# Patient Record
Sex: FEMALE | Race: WHITE | NOT HISPANIC OR LATINO | URBAN - METROPOLITAN AREA
[De-identification: names, ages, dates, MRNs, and addresses within clinical notes are randomized per-mention and may not be internally consistent; named-entity substitution may affect disease eponyms.]

---

## 2023-03-13 ENCOUNTER — EMERGENCY (EMERGENCY)
Facility: HOSPITAL | Age: 68
LOS: 1 days | Discharge: HOME | End: 2023-03-13
Attending: EMERGENCY MEDICINE | Admitting: EMERGENCY MEDICINE
Payer: MEDICARE

## 2023-03-13 VITALS
TEMPERATURE: 98 F | WEIGHT: 130.07 LBS | OXYGEN SATURATION: 97 % | HEART RATE: 92 BPM | SYSTOLIC BLOOD PRESSURE: 158 MMHG | RESPIRATION RATE: 18 BRPM | DIASTOLIC BLOOD PRESSURE: 103 MMHG

## 2023-03-13 VITALS
TEMPERATURE: 98 F | OXYGEN SATURATION: 98 % | HEART RATE: 85 BPM | RESPIRATION RATE: 18 BRPM | DIASTOLIC BLOOD PRESSURE: 89 MMHG | SYSTOLIC BLOOD PRESSURE: 150 MMHG

## 2023-03-13 LAB
ALBUMIN SERPL ELPH-MCNC: 3.5 G/DL — SIGNIFICANT CHANGE UP (ref 3.4–5)
ALP SERPL-CCNC: 104 U/L — SIGNIFICANT CHANGE UP (ref 40–120)
ALT FLD-CCNC: 22 U/L — SIGNIFICANT CHANGE UP (ref 12–42)
ANION GAP SERPL CALC-SCNC: 9 MMOL/L — SIGNIFICANT CHANGE UP (ref 9–16)
AST SERPL-CCNC: 16 U/L — SIGNIFICANT CHANGE UP (ref 15–37)
BASOPHILS # BLD AUTO: 0.03 K/UL — SIGNIFICANT CHANGE UP (ref 0–0.2)
BASOPHILS NFR BLD AUTO: 0.4 % — SIGNIFICANT CHANGE UP (ref 0–2)
BILIRUB SERPL-MCNC: 0.5 MG/DL — SIGNIFICANT CHANGE UP (ref 0.2–1.2)
BUN SERPL-MCNC: 19 MG/DL — SIGNIFICANT CHANGE UP (ref 7–23)
CALCIUM SERPL-MCNC: 9.6 MG/DL — SIGNIFICANT CHANGE UP (ref 8.5–10.5)
CHLORIDE SERPL-SCNC: 107 MMOL/L — SIGNIFICANT CHANGE UP (ref 96–108)
CO2 SERPL-SCNC: 25 MMOL/L — SIGNIFICANT CHANGE UP (ref 22–31)
CREAT SERPL-MCNC: 0.75 MG/DL — SIGNIFICANT CHANGE UP (ref 0.5–1.3)
EGFR: 88 ML/MIN/1.73M2 — SIGNIFICANT CHANGE UP
EOSINOPHIL # BLD AUTO: 0.13 K/UL — SIGNIFICANT CHANGE UP (ref 0–0.5)
EOSINOPHIL NFR BLD AUTO: 1.6 % — SIGNIFICANT CHANGE UP (ref 0–6)
GLUCOSE SERPL-MCNC: 110 MG/DL — HIGH (ref 70–99)
HCT VFR BLD CALC: 40.3 % — SIGNIFICANT CHANGE UP (ref 34.5–45)
HGB BLD-MCNC: 13.9 G/DL — SIGNIFICANT CHANGE UP (ref 11.5–15.5)
IMM GRANULOCYTES NFR BLD AUTO: 0.2 % — SIGNIFICANT CHANGE UP (ref 0–0.9)
LIDOCAIN IGE QN: 58 U/L — LOW (ref 73–393)
LYMPHOCYTES # BLD AUTO: 2.84 K/UL — SIGNIFICANT CHANGE UP (ref 1–3.3)
LYMPHOCYTES # BLD AUTO: 34 % — SIGNIFICANT CHANGE UP (ref 13–44)
MAGNESIUM SERPL-MCNC: 2 MG/DL — SIGNIFICANT CHANGE UP (ref 1.6–2.6)
MCHC RBC-ENTMCNC: 32.7 PG — SIGNIFICANT CHANGE UP (ref 27–34)
MCHC RBC-ENTMCNC: 34.5 GM/DL — SIGNIFICANT CHANGE UP (ref 32–36)
MCV RBC AUTO: 94.8 FL — SIGNIFICANT CHANGE UP (ref 80–100)
MONOCYTES # BLD AUTO: 0.47 K/UL — SIGNIFICANT CHANGE UP (ref 0–0.9)
MONOCYTES NFR BLD AUTO: 5.6 % — SIGNIFICANT CHANGE UP (ref 2–14)
NEUTROPHILS # BLD AUTO: 4.86 K/UL — SIGNIFICANT CHANGE UP (ref 1.8–7.4)
NEUTROPHILS NFR BLD AUTO: 58.2 % — SIGNIFICANT CHANGE UP (ref 43–77)
NRBC # BLD: 0 /100 WBCS — SIGNIFICANT CHANGE UP (ref 0–0)
PLATELET # BLD AUTO: 270 K/UL — SIGNIFICANT CHANGE UP (ref 150–400)
POTASSIUM SERPL-MCNC: 3.8 MMOL/L — SIGNIFICANT CHANGE UP (ref 3.5–5.3)
POTASSIUM SERPL-SCNC: 3.8 MMOL/L — SIGNIFICANT CHANGE UP (ref 3.5–5.3)
PROT SERPL-MCNC: 6.8 G/DL — SIGNIFICANT CHANGE UP (ref 6.4–8.2)
RBC # BLD: 4.25 M/UL — SIGNIFICANT CHANGE UP (ref 3.8–5.2)
RBC # FLD: 11.5 % — SIGNIFICANT CHANGE UP (ref 10.3–14.5)
SODIUM SERPL-SCNC: 141 MMOL/L — SIGNIFICANT CHANGE UP (ref 132–145)
TROPONIN I, HIGH SENSITIVITY RESULT: 16.1 NG/L — SIGNIFICANT CHANGE UP
WBC # BLD: 8.35 K/UL — SIGNIFICANT CHANGE UP (ref 3.8–10.5)
WBC # FLD AUTO: 8.35 K/UL — SIGNIFICANT CHANGE UP (ref 3.8–10.5)

## 2023-03-13 PROCEDURE — 73502 X-RAY EXAM HIP UNI 2-3 VIEWS: CPT | Mod: 26,LT

## 2023-03-13 PROCEDURE — 99285 EMERGENCY DEPT VISIT HI MDM: CPT

## 2023-03-13 PROCEDURE — 71045 X-RAY EXAM CHEST 1 VIEW: CPT | Mod: 26

## 2023-03-13 RX ORDER — MELOXICAM 15 MG/1
1 TABLET ORAL
Qty: 30 | Refills: 0
Start: 2023-03-13 | End: 2023-04-11

## 2023-03-13 RX ORDER — KETOROLAC TROMETHAMINE 30 MG/ML
15 SYRINGE (ML) INJECTION ONCE
Refills: 0 | Status: DISCONTINUED | OUTPATIENT
Start: 2023-03-13 | End: 2023-03-13

## 2023-03-13 RX ADMIN — Medication 15 MILLIGRAM(S): at 17:37

## 2023-03-13 RX ADMIN — Medication 15 MILLIGRAM(S): at 17:20

## 2023-03-13 NOTE — ED PROVIDER NOTE - NSFOLLOWUPINSTRUCTIONS_ED_ALL_ED_FT
Please follow up with a primary care doctor.  If you do not have a primary care doctor you may refer to one of the clinics listed below:    [facility]  [address]  [phone] (appointments)    [place] [address]  [phone]

## 2023-03-13 NOTE — ED ADULT NURSE NOTE - OBJECTIVE STATEMENT
PT walked in c/o of chest pain radiating to the left arm with numbness, palpitations x today and atraumatic left hip pain x 1 week. Pt reports palpitations and pain started after carrying around three suitcases at [place]. PMH of angina and htn, does not take medications. PT received Asprin and nitroglycerin pta as per triage note and patient. Denies fever, chills, sob, n/v/d. Ambulatory with steady gait. PT walked in c/o of chest pain radiating to the left arm with numbness, palpitations x today and atraumatic left hip pain x 1 week. Pt reports palpitations and pain started after carrying around three suitcases at [place]. PMH of angina and htn, does not take medications. PT received Asprin and nitroglycerin pta as per triage note and patient. Denies fever, chills, sob, n/v/d. Ambulatory with steady gait. Pt reports being evaluated at [facility] yesterday for the same complaint and was discharged.

## 2023-03-13 NOTE — ED ADULT NURSE NOTE - SKIN TURGOR
Vital Signs - nursing notes reviewed and confirmed  Gen - WDWN F, NAD, comfortable and non-toxic appearing, speaking in full sentences   Skin - warm, dry, intact  HEENT - AT/NC, PERRL, EOMI, no conjunctival injection, moist oral mucosa, TM intact b/l with good cone of lights, o/p clear with no erythema, edema, or exudate, uvula midline, airway patent, neck supple and NT, FROM, no JVD or carotid bruits b/l, no palpable nodes  CV - S1S2, R/R/R, no m/r/g   Resp - respiration non-labored, CTAB, symmetric bs b/l, no r/r/w  GI - NABS, soft, ND, NT, no rebound or guarding, no CVAT b/l   MS - w/w/p, no c/c/e, calves supple and NT, distal pulses symmetric b/l, brisk cap refills, +SILT  Neuro - AxOx3, no focal neuro deficits, CN II-XII grossly intact, cerebellar function intact, negative pronator drift, negative nystagmus, ambulatory without gait disturbance
resilient/elastic

## 2023-03-13 NOTE — ED PROVIDER NOTE - NS ED ROS FT
· CONSTITUTIONAL: no fever and no chills.  · CARDIOVASCULAR: no chest pain, +palpitations  · RESPIRATORY: no sob  · GASTROINTESTINAL: no abdominal pain, no nausea and no vomiting.  · MUSCULOSKELETAL: +left hip pain  · NEURO: no headache, +fogginess  All other systems negative

## 2023-03-13 NOTE — ED ADULT TRIAGE NOTE - CHIEF COMPLAINT QUOTE
pt. c/o sudden onset of chest pain, neck pain and hip pain while walking. Pt. with hx. of HTN but has not taken her meds in a week. Pt. given 324mg ASA and one dose of sublingual nitro PTA. Pt. was treated and released at [facility] yesterday for similar complaints.

## 2023-03-13 NOTE — ED PROVIDER NOTE - PATIENT PORTAL LINK FT
You can access the FollowMyHealth Patient Portal offered by [facility] by registering at the following website: [email or web address]. By joining [employer] [facility] [facility]’s FollowMyHealth portal, you will also be able to view your health information using other applications (apps) compatible with our system.

## 2023-03-13 NOTE — ED PROVIDER NOTE - OBJECTIVE STATEMENT
67-year-old female presenting complaining of palpitations as well as pain in the left hip and numbness in the left arm.  She was walking carrying lots of luggage to her cousins to put in storage when the symptoms began.  It for started as hip pain and then the palpitations started.  She reports being at [facility] a few days ago for similar symptoms and was discharged but not told anything and not given any medications.  She is unable to provide me the paperwork from that discharge.  She does have a history of angina and hypertension but is not taking any medications.  She denies any injuries or falls.

## 2023-03-13 NOTE — ED PROVIDER NOTE - CLINICAL SUMMARY MEDICAL DECISION MAKING FREE TEXT BOX
67-year-old female presenting complaining of palpitations as well as left hip pain and a foggy feeling in her head.  She already had a full cardiac work-up including a CT scan of the chest at [facility] in the last few days.  Although she is not able to provide paperwork from that hospitalization.  Her EKG shows no ectopy or ischemic changes.  Labs including troponin are normal.  Chest x-ray also reveals no abnormalities.  She has been awake and alert and in no distress since arriving in the ER.  Will discharge with recommendations to start taking meloxicam for her left hip as there does appear to be arthritis in that joint.  We will also provide contact information for the patient to establish care with a primary care doctor.

## 2023-03-13 NOTE — ED ADULT NURSE NOTE - CHIEF COMPLAINT QUOTE
pt. c/o sudden onset of chest pain, neck pain and hip pain while walking. Pt. with hx. of HTN but has not taken her meds in a week. Pt. given 324mg ASA and one dose of sublingual nitro PTA. Pt. was treated and released at [facility] yesterday for similar complaints.

## 2023-03-15 DIAGNOSIS — R00.2 PALPITATIONS: ICD-10-CM

## 2023-03-15 DIAGNOSIS — I10 ESSENTIAL (PRIMARY) HYPERTENSION: ICD-10-CM

## 2023-03-15 DIAGNOSIS — M25.552 PAIN IN LEFT HIP: ICD-10-CM

## 2023-03-15 DIAGNOSIS — Z87.39 PERSONAL HISTORY OF OTHER DISEASES OF THE MUSCULOSKELETAL SYSTEM AND CONNECTIVE TISSUE: ICD-10-CM

## 2023-03-15 DIAGNOSIS — Z86.79 PERSONAL HISTORY OF OTHER DISEASES OF THE CIRCULATORY SYSTEM: ICD-10-CM

## 2023-03-15 DIAGNOSIS — R20.0 ANESTHESIA OF SKIN: ICD-10-CM

## 2023-04-07 ENCOUNTER — EMERGENCY (EMERGENCY)
Facility: HOSPITAL | Age: 68
LOS: 1 days | Discharge: HOME | End: 2023-04-07
Attending: EMERGENCY MEDICINE | Admitting: EMERGENCY MEDICINE
Payer: MEDICARE

## 2023-04-07 VITALS
HEART RATE: 90 BPM | HEIGHT: 57 IN | RESPIRATION RATE: 18 BRPM | SYSTOLIC BLOOD PRESSURE: 174 MMHG | TEMPERATURE: 99 F | DIASTOLIC BLOOD PRESSURE: 98 MMHG | OXYGEN SATURATION: 93 % | WEIGHT: 130.07 LBS

## 2023-04-07 VITALS
DIASTOLIC BLOOD PRESSURE: 80 MMHG | TEMPERATURE: 98 F | OXYGEN SATURATION: 95 % | HEART RATE: 91 BPM | SYSTOLIC BLOOD PRESSURE: 154 MMHG | RESPIRATION RATE: 16 BRPM

## 2023-04-07 DIAGNOSIS — R05.3 CHRONIC COUGH: ICD-10-CM

## 2023-04-07 DIAGNOSIS — R07.9 CHEST PAIN, UNSPECIFIED: ICD-10-CM

## 2023-04-07 DIAGNOSIS — F17.200 NICOTINE DEPENDENCE, UNSPECIFIED, UNCOMPLICATED: ICD-10-CM

## 2023-04-07 DIAGNOSIS — R06.00 DYSPNEA, UNSPECIFIED: ICD-10-CM

## 2023-04-07 DIAGNOSIS — M79.605 PAIN IN LEFT LEG: ICD-10-CM

## 2023-04-07 DIAGNOSIS — R06.2 WHEEZING: ICD-10-CM

## 2023-04-07 DIAGNOSIS — Z20.822 CONTACT WITH AND (SUSPECTED) EXPOSURE TO COVID-19: ICD-10-CM

## 2023-04-07 DIAGNOSIS — Z86.79 PERSONAL HISTORY OF OTHER DISEASES OF THE CIRCULATORY SYSTEM: ICD-10-CM

## 2023-04-07 LAB
ALBUMIN SERPL ELPH-MCNC: 3.5 G/DL — SIGNIFICANT CHANGE UP (ref 3.4–5)
ALP SERPL-CCNC: 125 U/L — HIGH (ref 40–120)
ALT FLD-CCNC: 24 U/L — SIGNIFICANT CHANGE UP (ref 12–42)
ANION GAP SERPL CALC-SCNC: 6 MMOL/L — LOW (ref 9–16)
AST SERPL-CCNC: 19 U/L — SIGNIFICANT CHANGE UP (ref 15–37)
BASOPHILS # BLD AUTO: 0.03 K/UL — SIGNIFICANT CHANGE UP (ref 0–0.2)
BASOPHILS NFR BLD AUTO: 0.3 % — SIGNIFICANT CHANGE UP (ref 0–2)
BILIRUB SERPL-MCNC: 0.3 MG/DL — SIGNIFICANT CHANGE UP (ref 0.2–1.2)
BUN SERPL-MCNC: 27 MG/DL — HIGH (ref 7–23)
CALCIUM SERPL-MCNC: 10 MG/DL — SIGNIFICANT CHANGE UP (ref 8.5–10.5)
CHLORIDE SERPL-SCNC: 107 MMOL/L — SIGNIFICANT CHANGE UP (ref 96–108)
CO2 SERPL-SCNC: 31 MMOL/L — SIGNIFICANT CHANGE UP (ref 22–31)
CREAT SERPL-MCNC: 0.96 MG/DL — SIGNIFICANT CHANGE UP (ref 0.5–1.3)
EGFR: 66 ML/MIN/1.73M2 — SIGNIFICANT CHANGE UP
EOSINOPHIL # BLD AUTO: 0.22 K/UL — SIGNIFICANT CHANGE UP (ref 0–0.5)
EOSINOPHIL NFR BLD AUTO: 2.1 % — SIGNIFICANT CHANGE UP (ref 0–6)
ETHANOL SERPL-MCNC: <3 MG/DL — SIGNIFICANT CHANGE UP
GLUCOSE SERPL-MCNC: 158 MG/DL — HIGH (ref 70–99)
HCT VFR BLD CALC: 40.6 % — SIGNIFICANT CHANGE UP (ref 34.5–45)
HGB BLD-MCNC: 13.9 G/DL — SIGNIFICANT CHANGE UP (ref 11.5–15.5)
IMM GRANULOCYTES NFR BLD AUTO: 0.4 % — SIGNIFICANT CHANGE UP (ref 0–0.9)
LYMPHOCYTES # BLD AUTO: 3.42 K/UL — HIGH (ref 1–3.3)
LYMPHOCYTES # BLD AUTO: 33 % — SIGNIFICANT CHANGE UP (ref 13–44)
MAGNESIUM SERPL-MCNC: 2.2 MG/DL — SIGNIFICANT CHANGE UP (ref 1.6–2.6)
MCHC RBC-ENTMCNC: 32.8 PG — SIGNIFICANT CHANGE UP (ref 27–34)
MCHC RBC-ENTMCNC: 34.2 GM/DL — SIGNIFICANT CHANGE UP (ref 32–36)
MCV RBC AUTO: 95.8 FL — SIGNIFICANT CHANGE UP (ref 80–100)
MONOCYTES # BLD AUTO: 0.53 K/UL — SIGNIFICANT CHANGE UP (ref 0–0.9)
MONOCYTES NFR BLD AUTO: 5.1 % — SIGNIFICANT CHANGE UP (ref 2–14)
NEUTROPHILS # BLD AUTO: 6.13 K/UL — SIGNIFICANT CHANGE UP (ref 1.8–7.4)
NEUTROPHILS NFR BLD AUTO: 59.1 % — SIGNIFICANT CHANGE UP (ref 43–77)
NRBC # BLD: 0 /100 WBCS — SIGNIFICANT CHANGE UP (ref 0–0)
NT-PROBNP SERPL-SCNC: 165 PG/ML — SIGNIFICANT CHANGE UP
PCO2 BLDV: 44 MMHG — HIGH (ref 39–42)
PH BLDV: 7.42 — SIGNIFICANT CHANGE UP (ref 7.32–7.43)
PLATELET # BLD AUTO: 347 K/UL — SIGNIFICANT CHANGE UP (ref 150–400)
PO2 BLDV: 46 MMHG — HIGH (ref 25–45)
POTASSIUM SERPL-MCNC: 4.1 MMOL/L — SIGNIFICANT CHANGE UP (ref 3.5–5.3)
POTASSIUM SERPL-SCNC: 4.1 MMOL/L — SIGNIFICANT CHANGE UP (ref 3.5–5.3)
PROT SERPL-MCNC: 7.1 G/DL — SIGNIFICANT CHANGE UP (ref 6.4–8.2)
RBC # BLD: 4.24 M/UL — SIGNIFICANT CHANGE UP (ref 3.8–5.2)
RBC # FLD: 11.7 % — SIGNIFICANT CHANGE UP (ref 10.3–14.5)
SAO2 % BLDV: 80.6 % — SIGNIFICANT CHANGE UP (ref 67–88)
SARS-COV-2 RNA SPEC QL NAA+PROBE: SIGNIFICANT CHANGE UP
SODIUM SERPL-SCNC: 144 MMOL/L — SIGNIFICANT CHANGE UP (ref 132–145)
TROPONIN I, HIGH SENSITIVITY RESULT: 31.7 NG/L — SIGNIFICANT CHANGE UP
WBC # BLD: 10.37 K/UL — SIGNIFICANT CHANGE UP (ref 3.8–10.5)
WBC # FLD AUTO: 10.37 K/UL — SIGNIFICANT CHANGE UP (ref 3.8–10.5)

## 2023-04-07 PROCEDURE — 99285 EMERGENCY DEPT VISIT HI MDM: CPT | Mod: CS

## 2023-04-07 PROCEDURE — 71045 X-RAY EXAM CHEST 1 VIEW: CPT | Mod: 26

## 2023-04-07 PROCEDURE — 73590 X-RAY EXAM OF LOWER LEG: CPT | Mod: 26,LT

## 2023-04-07 RX ORDER — ACETAMINOPHEN 500 MG
650 TABLET ORAL ONCE
Refills: 0 | Status: COMPLETED | OUTPATIENT
Start: 2023-04-07 | End: 2023-04-07

## 2023-04-07 RX ORDER — ALBUTEROL 90 UG/1
2 AEROSOL, METERED ORAL ONCE
Refills: 0 | Status: COMPLETED | OUTPATIENT
Start: 2023-04-07 | End: 2023-04-07

## 2023-04-07 RX ORDER — DEXAMETHASONE 0.5 MG/5ML
4 ELIXIR ORAL ONCE
Refills: 0 | Status: COMPLETED | OUTPATIENT
Start: 2023-04-07 | End: 2023-04-07

## 2023-04-07 RX ORDER — METHOCARBAMOL 500 MG/1
1500 TABLET, FILM COATED ORAL ONCE
Refills: 0 | Status: COMPLETED | OUTPATIENT
Start: 2023-04-07 | End: 2023-04-07

## 2023-04-07 RX ADMIN — METHOCARBAMOL 1500 MILLIGRAM(S): 500 TABLET, FILM COATED ORAL at 20:32

## 2023-04-07 RX ADMIN — ALBUTEROL 2 PUFF(S): 90 AEROSOL, METERED ORAL at 20:32

## 2023-04-07 RX ADMIN — Medication 650 MILLIGRAM(S): at 20:32

## 2023-04-07 RX ADMIN — Medication 4 MILLIGRAM(S): at 20:32

## 2023-04-07 NOTE — ED PROVIDER NOTE - PATIENT PORTAL LINK FT
You can access the FollowMyHealth Patient Portal offered by [facility] by registering at the following website: [email or web address]. By joining [employer]’s FollowMyHealth portal, you will also be able to view your health information using other applications (apps) compatible with our system.

## 2023-04-07 NOTE — ED PROVIDER NOTE - CLINICAL SUMMARY MEDICAL DECISION MAKING FREE TEXT BOX
pt presents w  several complains, similar visit in March. Will work up for chest pain/dyspnea. On exam some mild wheezing on expiration.   given hx of smoking suspect early undiagnosed COPD. will treat w  inhaler/decadron dose. Will do rest of labs for chest pain work up and CXR. Will also get leg  xrays as pt is complaining of pain. EKG same as prior visit.

## 2023-04-07 NOTE — ED ADULT NURSE NOTE - NSIMPLEMENTINTERV_GEN_ALL_ED
Implemented All Fall Risk Interventions:  [place] to call system. Call bell, personal items and telephone within reach. Instruct patient to call for assistance. Room bathroom lighting operational. Non-slip footwear when patient is off stretcher. Physically safe environment: no spills, clutter or unnecessary equipment. Stretcher in lowest position, wheels locked, appropriate side rails in place. Provide visual cue, wrist band, yellow gown, etc. Monitor gait and stability. Monitor for mental status changes and reorient to person, place, and time. Review medications for side effects contributing to fall risk. Reinforce activity limits and safety measures with patient and family.

## 2023-04-07 NOTE — ED ADULT TRIAGE NOTE - CHIEF COMPLAINT QUOTE
BIBA from street for palpitations x 30min and L Leg/hip pain x 2 days. h/o HTN, non compliant and smoker.

## 2023-04-07 NOTE — ED ADULT NURSE NOTE - CHPI ED NUR SYMPTOMS POS
Medications verified, no changes.  Denies known Latex allergy or symptoms of Latex sensitivity.  Pt. Declines weigh, states it was taken yeserday and is 207.     PAIN

## 2023-04-07 NOTE — ED PROVIDER NOTE - OBJECTIVE STATEMENT
68 yo female pt, hx of angina,  chronic smoker, presents w several complains. Presents w episodes of dyspnea and chest pain that happen intermittently. Pt states she is an active smoker and has  a chronic cough as well but no fever or production of sputum. Also complaining of non traumatic pain in LLE, shin area. no swelling, no deformity, no skin changes. no abd pain, no n/v/d, no HA, no neck pain.

## 2023-04-07 NOTE — ED ADULT NURSE NOTE - CHPI ED NUR AGGRAVATING FX
Maintaining well with timed voids, splinting  No incontinence, no UTI  Will call if symptoms exertion

## 2023-04-07 NOTE — ED PROVIDER NOTE - CARE PROVIDER_API CALL
Paloma Baer)  Internal Medicine  [address], [facility] [place] [address]  Phone: [phone]  Fax: [phone]  Follow Up Time:

## 2023-04-08 PROBLEM — Z86.79 PERSONAL HISTORY OF OTHER DISEASES OF THE CIRCULATORY SYSTEM: Chronic | Status: ACTIVE | Noted: 2023-03-13

## 2023-04-08 PROBLEM — M54.9 DORSALGIA, UNSPECIFIED: Chronic | Status: ACTIVE | Noted: 2023-03-13

## 2023-08-14 NOTE — ED PROVIDER NOTE - ST/T WAVE
PT settled into gurney, connected to cardiac monitor.   Aox4, Dried crusts surrounding G tube placement noted along with dark debris noted all along the inside of G tube.    lateral T inversions

## 2024-03-18 ENCOUNTER — INPATIENT (INPATIENT)
Facility: HOSPITAL | Age: 69
LOS: 3 days | Discharge: NURSING, REHAB OR LONG-TERM CARE | DRG: 62 | End: 2024-03-22
Attending: PSYCHIATRY & NEUROLOGY | Admitting: PSYCHIATRY & NEUROLOGY
Payer: MEDICARE

## 2024-03-18 VITALS
SYSTOLIC BLOOD PRESSURE: 147 MMHG | OXYGEN SATURATION: 97 % | RESPIRATION RATE: 16 BRPM | HEART RATE: 82 BPM | WEIGHT: 119.05 LBS | DIASTOLIC BLOOD PRESSURE: 83 MMHG | HEIGHT: 57 IN | TEMPERATURE: 98 F

## 2024-03-18 LAB
ALBUMIN SERPL ELPH-MCNC: 4 G/DL — SIGNIFICANT CHANGE UP (ref 3.3–5)
ALP SERPL-CCNC: 111 U/L — SIGNIFICANT CHANGE UP (ref 40–120)
ALT FLD-CCNC: 13 U/L — SIGNIFICANT CHANGE UP (ref 10–45)
ANION GAP SERPL CALC-SCNC: 9 MMOL/L — SIGNIFICANT CHANGE UP (ref 5–17)
APTT BLD: 28 SEC — SIGNIFICANT CHANGE UP (ref 24.5–35.6)
AST SERPL-CCNC: 17 U/L — SIGNIFICANT CHANGE UP (ref 10–40)
BASOPHILS # BLD AUTO: 0.03 K/UL — SIGNIFICANT CHANGE UP (ref 0–0.2)
BASOPHILS NFR BLD AUTO: 0.3 % — SIGNIFICANT CHANGE UP (ref 0–2)
BILIRUB SERPL-MCNC: 0.6 MG/DL — SIGNIFICANT CHANGE UP (ref 0.2–1.2)
BUN SERPL-MCNC: 19 MG/DL — SIGNIFICANT CHANGE UP (ref 7–23)
CALCIUM SERPL-MCNC: 10.3 MG/DL — SIGNIFICANT CHANGE UP (ref 8.4–10.5)
CHLORIDE SERPL-SCNC: 108 MMOL/L — SIGNIFICANT CHANGE UP (ref 96–108)
CO2 SERPL-SCNC: 25 MMOL/L — SIGNIFICANT CHANGE UP (ref 22–31)
CREAT SERPL-MCNC: 0.88 MG/DL — SIGNIFICANT CHANGE UP (ref 0.5–1.3)
EGFR: 72 ML/MIN/1.73M2 — SIGNIFICANT CHANGE UP
EOSINOPHIL # BLD AUTO: 0.09 K/UL — SIGNIFICANT CHANGE UP (ref 0–0.5)
EOSINOPHIL NFR BLD AUTO: 0.8 % — SIGNIFICANT CHANGE UP (ref 0–6)
FLUAV AG NPH QL: SIGNIFICANT CHANGE UP
FLUBV AG NPH QL: SIGNIFICANT CHANGE UP
GLUCOSE SERPL-MCNC: 144 MG/DL — HIGH (ref 70–99)
HCT VFR BLD CALC: 41.5 % — SIGNIFICANT CHANGE UP (ref 34.5–45)
HGB BLD-MCNC: 14.1 G/DL — SIGNIFICANT CHANGE UP (ref 11.5–15.5)
IMM GRANULOCYTES NFR BLD AUTO: 0.4 % — SIGNIFICANT CHANGE UP (ref 0–0.9)
INR BLD: 0.98 — SIGNIFICANT CHANGE UP (ref 0.85–1.18)
LYMPHOCYTES # BLD AUTO: 28.1 % — SIGNIFICANT CHANGE UP (ref 13–44)
LYMPHOCYTES # BLD AUTO: 3.14 K/UL — SIGNIFICANT CHANGE UP (ref 1–3.3)
MCHC RBC-ENTMCNC: 32 PG — SIGNIFICANT CHANGE UP (ref 27–34)
MCHC RBC-ENTMCNC: 34 GM/DL — SIGNIFICANT CHANGE UP (ref 32–36)
MCV RBC AUTO: 94.3 FL — SIGNIFICANT CHANGE UP (ref 80–100)
MONOCYTES # BLD AUTO: 0.72 K/UL — SIGNIFICANT CHANGE UP (ref 0–0.9)
MONOCYTES NFR BLD AUTO: 6.4 % — SIGNIFICANT CHANGE UP (ref 2–14)
NEUTROPHILS # BLD AUTO: 7.17 K/UL — SIGNIFICANT CHANGE UP (ref 1.8–7.4)
NEUTROPHILS NFR BLD AUTO: 64 % — SIGNIFICANT CHANGE UP (ref 43–77)
NRBC # BLD: 0 /100 WBCS — SIGNIFICANT CHANGE UP (ref 0–0)
NT-PROBNP SERPL-SCNC: 107 PG/ML — SIGNIFICANT CHANGE UP (ref 0–300)
PLATELET # BLD AUTO: 407 K/UL — HIGH (ref 150–400)
POTASSIUM SERPL-MCNC: 4.2 MMOL/L — SIGNIFICANT CHANGE UP (ref 3.5–5.3)
POTASSIUM SERPL-SCNC: 4.2 MMOL/L — SIGNIFICANT CHANGE UP (ref 3.5–5.3)
PROT SERPL-MCNC: 6.5 G/DL — SIGNIFICANT CHANGE UP (ref 6–8.3)
PROTHROM AB SERPL-ACNC: 11.2 SEC — SIGNIFICANT CHANGE UP (ref 9.5–13)
RBC # BLD: 4.4 M/UL — SIGNIFICANT CHANGE UP (ref 3.8–5.2)
RBC # FLD: 12 % — SIGNIFICANT CHANGE UP (ref 10.3–14.5)
RSV RNA NPH QL NAA+NON-PROBE: SIGNIFICANT CHANGE UP
SARS-COV-2 RNA SPEC QL NAA+PROBE: SIGNIFICANT CHANGE UP
SODIUM SERPL-SCNC: 142 MMOL/L — SIGNIFICANT CHANGE UP (ref 135–145)
TROPONIN T, HIGH SENSITIVITY RESULT: 7 NG/L — SIGNIFICANT CHANGE UP (ref 0–51)
WBC # BLD: 11.19 K/UL — HIGH (ref 3.8–10.5)
WBC # FLD AUTO: 11.19 K/UL — HIGH (ref 3.8–10.5)

## 2024-03-18 PROCEDURE — 70498 CT ANGIOGRAPHY NECK: CPT | Mod: 26,MC

## 2024-03-18 PROCEDURE — 70496 CT ANGIOGRAPHY HEAD: CPT | Mod: 26,MC

## 2024-03-18 PROCEDURE — 99291 CRITICAL CARE FIRST HOUR: CPT

## 2024-03-18 PROCEDURE — 70450 CT HEAD/BRAIN W/O DYE: CPT | Mod: 26,MC,59

## 2024-03-18 PROCEDURE — 71045 X-RAY EXAM CHEST 1 VIEW: CPT | Mod: 26

## 2024-03-18 PROCEDURE — 0042T: CPT | Mod: MC

## 2024-03-18 RX ORDER — TENECTEPLASE 50 MG
14 KIT INTRAVENOUS ONCE
Refills: 0 | Status: COMPLETED | OUTPATIENT
Start: 2024-03-18 | End: 2024-03-18

## 2024-03-18 RX ORDER — SODIUM CHLORIDE 9 MG/ML
10 INJECTION INTRAMUSCULAR; INTRAVENOUS; SUBCUTANEOUS ONCE
Refills: 0 | Status: DISCONTINUED | OUTPATIENT
Start: 2024-03-18 | End: 2024-03-18

## 2024-03-18 RX ORDER — NICOTINE POLACRILEX 2 MG
1 GUM BUCCAL DAILY
Refills: 0 | Status: DISCONTINUED | OUTPATIENT
Start: 2024-03-18 | End: 2024-03-22

## 2024-03-18 RX ORDER — LABETALOL HCL 100 MG
20 TABLET ORAL ONCE
Refills: 0 | Status: COMPLETED | OUTPATIENT
Start: 2024-03-18 | End: 2024-03-18

## 2024-03-18 RX ORDER — SODIUM CHLORIDE 9 MG/ML
1000 INJECTION INTRAMUSCULAR; INTRAVENOUS; SUBCUTANEOUS
Refills: 0 | Status: DISCONTINUED | OUTPATIENT
Start: 2024-03-18 | End: 2024-03-19

## 2024-03-18 RX ADMIN — Medication 20 MILLIGRAM(S): at 16:51

## 2024-03-18 RX ADMIN — Medication 1 PATCH: at 23:32

## 2024-03-18 RX ADMIN — TENECTEPLASE 2016 MILLIGRAM(S): KIT at 16:56

## 2024-03-18 NOTE — ED ADULT NURSE NOTE - OBJECTIVE STATEMENT
Pt is a 67 yo F bibems from [facility] initially c/o SOB and generalized weakness during triage. Upon RN marcell however, pt c/o weakness to both L extremities starting around 1300 today, denies fall, loc. On exam, pt demonstrated decreased strength in L hand and L leg, L-sided pronator drift and unable to lift L leg off of bed; L sided facial droop also noted when smiling. Otherwise pt speaking in full and complete sentences, respirations even and unlabored. Pt able to scoot herself up in stretcher for repositioning.  MD Rosario notified of exam findings, at bedside to assess pt. Stroke code called, with neuro PA arriving to bedside. PIV placed and pt transported to CT on portable cardiac monitor.  Pt is an everyday smoker 1/2-1 ppd.

## 2024-03-18 NOTE — ED PROVIDER NOTE - OBJECTIVE STATEMENT
67 yo female pt, hx of angina,  chronic smoker. at ~1pm today developed weakness and sob and palpitations. prior to that was in her usual state of health today. still doesn't feel "right"

## 2024-03-18 NOTE — ED PROVIDER NOTE - PHYSICAL EXAMINATION
General: Awake, alert and oriented. No acute distress.   Skin:  Appropriate color for ethnicity  HENMT: head normocephalic and atraumatic  EYES: Conjunctiva clear. nonicteric sclera  Cardiac: well perfused  Respiratory: breathing comfortably on room air  Abdominal: nondistended  MSK:  no visualized external signs of trauma. no apparent deficits in ROM of any extremity  Neurological: The patient is awake, alert and oriented with normal speech. left facial droop, 3/5 strength in left arm and leg. right extremity strength 5/5. Memory is normal and thought process is intact. moving all extremities spontaneously. sensation intact in all 4 extremities  Psychiatric: Appropriate mood and affect. Good judgement and insight

## 2024-03-18 NOTE — ED PROVIDER NOTE - NS ED MD DISPO ADMIT [facility] PALLIATIVE CARE
NONE
I have personally seen and examined this patient.  I have fully participated in the care of this patient. I have reviewed all pertinent clinical information, including history, physical exam, plan and the Resident’s note and agree except as noted.

## 2024-03-18 NOTE — ED ADULT NURSE NOTE - NSFALLHARMRISKINTERV_ED_ALL_ED

## 2024-03-18 NOTE — H&P ADULT - HISTORY OF PRESENT ILLNESS
**STROKE HPI***    HPI: 68y Female with PMHx of tobacco use disorder, HTN, COPD, DM, angina presenting to ED for SOB and L sided weakness/numbness. LKW 1pm when sx started. NIHSS 8. L NLFF, LUE 3/5, LLE 2/5, LUE/LLE numbness. CTH no acute intracranial hemorrhage, mass effect or large demarcated territorial infarction. Punctate linear density in the right cerebellar hemisphere likely representing calcification. Intracranial CTA: No large vessel occlusion. Supraclinoid left ICA aneurysm versus infundibulum. Extracranial CTA: Moderate stenosis the proximal bilateral internal carotid artery secondary to mixed calcified and noncalcified plaque. Moderate stenosis the proximal left subclavian artery. 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 5 mm right upper lobe pulmonary nodule. Case discussed with Dr. Araujo prior to urgent intervention. Risk and benefits of tenecteplase were discussed with patient including risk of symptomatic ICH/death. Decision was made that benefits outweighed risks and tenecteplase was administered at 1656.        PAST MEDICAL & SURGICAL HISTORY:  History of angina      Back pain          FAMILY HISTORY:    T(C): 36.5 (03-18-24 @ 15:05), Max: 36.5 (03-18-24 @ 15:05)  HR: 70 (03-18-24 @ 16:55) (70 - 86)  BP: 153/73 (03-18-24 @ 17:00) (147/83 - 219/102)  RR: 18 (03-18-24 @ 16:55) (16 - 18)  SpO2: 97% (03-18-24 @ 16:55) (94% - 98%)    MEDICATION RECONCILIATION   MEDICATIONS  (STANDING):  labetalol Injectable 20 milliGRAM(s) IV Push Once  tenecteplase 50 milliGRAM(s) Injectable (Rx Quick Charge). 36 milliGRAM(s) Waste   tenecteplase Injectable. 14 milliGRAM(s) IV Push. Once    MEDICATIONS  (PRN):    Allergies    No Known Allergies    Intolerances      Vital Signs Last 24 Hrs  T(C): 36.5 (18 Mar 2024 15:05), Max: 36.5 (18 Mar 2024 15:05)  T(F): 97.7 (18 Mar 2024 15:05), Max: 97.7 (18 Mar 2024 15:05)  HR: 70 (18 Mar 2024 16:55) (70 - 86)  BP: 153/73 (18 Mar 2024 17:00) (147/83 - 219/102)  BP(mean): --  RR: 18 (18 Mar 2024 16:55) (16 - 18)  SpO2: 97% (18 Mar 2024 16:55) (94% - 98%)    Parameters below as of 18 Mar 2024 16:55  Patient On (Oxygen Delivery Method): room air

## 2024-03-18 NOTE — PROGRESS NOTE ADULT - SUBJECTIVE AND OBJECTIVE BOX
***********************************************  ADULT NSICU PROGRESS NOTE  VICKY ALAMO [id number] [facility] 08EA [id number]  ***********************************************    24H INTERVAL EVENTS:    ROS: negative except per mentioned above in 24h interval events.      HOSPITAL COURSE CARRIED FORWARD:    VITALS:    ICU Vital Signs Last 24 Hrs  T(C): 36.8 (18 Mar 2024 18:02), Max: 36.8 (18 Mar 2024 18:02)  T(F): 98.3 (18 Mar 2024 18:02), Max: 98.3 (18 Mar 2024 18:02)  HR: 58 (18 Mar 2024 19:00) (58 - 86)  BP: 119/58 (18 Mar 2024 19:00) (119/58 - 219/102)  BP(mean): 84 (18 Mar 2024 19:00) (84 - 109)  ABP: --  ABP(mean): --  RR: 27 (18 Mar 2024 19:00) (16 - 32)  SpO2: 97% (18 Mar 2024 19:00) (94% - 99%)    O2 Parameters below as of 18 Mar 2024 19:00  Patient On (Oxygen Delivery Method): room air                I&O's Summary      EXAM:     [place] Coma Scale:     General: normocephalic, atraumatic, laying in bed, in no distress  Neuro     MS: A/Ox3, cooperative, normal attention, no neglect, comprehension intact, speech with preserved fluency, naming, and repetition    CN: PERRL, VF FTC, EOMI and no ptosis bilaterally, sensation intact to crude touch V1-V3, face symmetric, hearing grossly intact    Mot: bulk normal, tone normal, power 5/5 in bilateral upper and lower proximal extremities    Sens: intact to crude touch in bilateral upper and lower extremities    Reflexes: deferred    Coord: no dysmetria or ataxia on finger to nose/heel to shin, respectively, no focal bradykinetic movements    Gait: deferred  Chest: nonlabored respirations, no adventitious lung sounds bilaterally, heart regular rate/rhythm, present S1/S2, no murmurs or rubs  Abdomen: nondistended, soft and nontender without peritoneal signs, normoactive bowel sounds  Extremities: no clubbing, well-perfused, no edema    LABORATORY DATA:                            14.1   11.19 )-----------( 407      ( 18 Mar 2024 16:01 )             41.5     03-18    142  |  108  |  19  ----------------------------<  144<H>  4.2   |  25  |  0.88    Ca    10.3      18 Mar 2024 16:01    TPro  6.5  /  Alb  4.0  /  TBili  0.6  /  DBili  x   /  AST  17  /  ALT  13  /  AlkPhos  111  03-18    LIVER FUNCTIONS - ( 18 Mar 2024 16:01 )  Alb: 4.0 g/dL / Pro: 6.5 g/dL / ALK PHOS: 111 U/L / ALT: 13 U/L / AST: 17 U/L / GGT: x           PT/INR - ( 18 Mar 2024 16:01 )   PT: 11.2 sec;   INR: 0.98          PTT - ( 18 Mar 2024 16:01 )  PTT:28.0 sec    IMAGING DATA:    CARDIOLOGY DATA:    MICROBIOLOGY DATA:        MEDICATIONS  (STANDING):  sodium chloride 0.9%. 1000 milliLiter(s) (50 mL/Hr) IV Continuous <Continuous>    MEDICATIONS  (PRN):      ***********************************************  ASSESSMENT AND PLAN  ***********************************************    This is a case of ***    NEURO  - Admit NSICU, Q1h neuro checks / Q1h vital signs    PULM  - SpO2 goal > 92%, supplemental O2 and pulm toileting as needed    CARDIO  - BP goal:     GI  - Diet:   - Stress ulcer prophylaxis:     /RENAL  - Monitor UOP/volume status, BUN/SCr    HEME  - Maintain Hb > 7.0, PLT > ***    ID  - Monitor for infectious s/s, fever curve, leukocytosis    ENDO    03-18-24 @ 19:02       ***********************************************  ADULT NSICU PROGRESS NOTE  VICKY ALAMO [id number] [facility] [id number]  ***********************************************    Brief HPI: 67yo F with history of tobacco use, HTN/DM, COPD, angina pectoris admitted to the NSICU now s/p TNK (4:56 PM, 3/18/24).  The patient presented with c/o L. sided weakness and hypesthesias, NIHSS = 8, and last known well at ~1:00 PM.  Relevant objective data on assessment showed moderate bilateral ICA stenosis, L. subclavian/vert stenosis, and a 5mm pulmonary nodule.      ROS: negative except per mentioned above in 24h interval events.      HOSPITAL COURSE CARRIED FORWARD:    VITALS:    ICU Vital Signs Last 24 Hrs  T(C): 36.8 (18 Mar 2024 18:02), Max: 36.8 (18 Mar 2024 18:02)  T(F): 98.3 (18 Mar 2024 18:02), Max: 98.3 (18 Mar 2024 18:02)  HR: 58 (18 Mar 2024 19:00) (58 - 86)  BP: 119/58 (18 Mar 2024 19:00) (119/58 - 219/102)  BP(mean): 84 (18 Mar 2024 19:00) (84 - 109)  ABP: --  ABP(mean): --  RR: 27 (18 Mar 2024 19:00) (16 - 32)  SpO2: 97% (18 Mar 2024 19:00) (94% - 99%)    O2 Parameters below as of 18 Mar 2024 19:00  Patient On (Oxygen Delivery Method): room air                I&O's Summary      EXAM:     [place] Coma Scale:     General: normocephalic, atraumatic, laying in bed, in no distress  Neuro     MS: A/Ox3, cooperative, normal attention, no neglect, comprehension intact, speech with preserved fluency, naming, and repetition    CN: PERRL, VF FTC, EOMI and no ptosis bilaterally, sensation intact to crude touch V1-V3, face symmetric, hearing grossly intact    Mot: bulk normal, tone normal, power 5/5 in bilateral upper and lower proximal extremities    Sens: intact to crude touch in bilateral upper and lower extremities    Reflexes: deferred    Coord: no dysmetria or ataxia on finger to nose/heel to shin, respectively, no focal bradykinetic movements    Gait: deferred  Chest: nonlabored respirations, no adventitious lung sounds bilaterally, heart regular rate/rhythm, present S1/S2, no murmurs or rubs  Abdomen: nondistended, soft and nontender without peritoneal signs, normoactive bowel sounds  Extremities: no clubbing, well-perfused, no edema    LABORATORY DATA:                            14.1   11.19 )-----------( 407      ( 18 Mar 2024 16:01 )             41.5     03-18    142  |  108  |  19  ----------------------------<  144<H>  4.2   |  25  |  0.88    Ca    10.3      18 Mar 2024 16:01    TPro  6.5  /  Alb  4.0  /  TBili  0.6  /  DBili  x   /  AST  17  /  ALT  13  /  AlkPhos  111  03-18    LIVER FUNCTIONS - ( 18 Mar 2024 16:01 )  Alb: 4.0 g/dL / Pro: 6.5 g/dL / ALK PHOS: 111 U/L / ALT: 13 U/L / AST: 17 U/L / GGT: x           PT/INR - ( 18 Mar 2024 16:01 )   PT: 11.2 sec;   INR: 0.98          PTT - ( 18 Mar 2024 16:01 )  PTT:28.0 sec    IMAGING DATA:    CARDIOLOGY DATA:    MICROBIOLOGY DATA:        MEDICATIONS  (STANDING):  sodium chloride 0.9%. 1000 milliLiter(s) (50 mL/Hr) IV Continuous <Continuous>    MEDICATIONS  (PRN):      ***********************************************  ASSESSMENT AND PLAN  ***********************************************    #Acute CVA, suspect lacunar infarct, s/p TNK @ 4:56 PM on 3/18/24  #Bilateral ICA stenosis  #5mm pulmonary nodule  #Reactive leukocytosis  #History of HTN, DM, COPD, angina pectoris    NEURO  - Admit NSICU, Q1h neuro checks / Q1h vital signs  - Stroke core measures, post TNK monitoring  - 24h CTH, MRI brain/fast w/o pj, MRA head/neck  - ASA 81 pending 24h scan, high intensity statin TBD based on lipid profile  - Bed rest for now, PT/OT, pain control    PULM  - Bronchodilators as needed, admission bicarb 25, admission CXR w/ good lung volumes, clear lung fields  - SpO2 goal > 92%, supplemental O2 and pulm toileting as needed    CARDIO  - BP goal: < 180/105 x24h post TNK, then NORMOTENSION  - TTE w/ bubble study, baseline cardia troponin, EKG    GI  - Diet: pending swallow evaluation  - Stress ulcer prophylaxis: not indicated  - Stool count, bowel regimen    /RENAL  - Monitor UOP/volume status, BUN/SCr    HEME  - Maintain Hb > 7.0, PLT > 10/20/50/100,000 depending on clinical scenario  - SCDs, holding VTE chemoppx as patient received TNK    ID  - Monitor for infectious s/s, fever curve, leukocytosis    ENDO  - Check A1c, glucose stabilizer/RASSI protocol < 180    03-18-24 @ 19:02       ***********************************************  ADULT NSICU PROGRESS NOTE  VICKY ALAMO [id number] [facility] 08EA [id number]  ***********************************************    Brief HPI: 69yo F with history of tobacco use, HTN/DM, COPD, angina pectoris admitted to the NSICU now s/p TNK (4:56 PM, 3/18/24).  The patient presented with c/o L. sided weakness and hypesthesias, NIHSS = 8, and last known well at ~1:00 PM.  Relevant objective data on assessment showed moderate bilateral ICA stenosis, L. subclavian/vert stenosis, and a 5mm pulmonary nodule.      ROS: negative except per mentioned above in 24h interval events.      HOSPITAL COURSE CARRIED FORWARD:    VITALS:    ICU Vital Signs Last 24 Hrs  T(C): 36.8 (18 Mar 2024 18:02), Max: 36.8 (18 Mar 2024 18:02)  T(F): 98.3 (18 Mar 2024 18:02), Max: 98.3 (18 Mar 2024 18:02)  HR: 58 (18 Mar 2024 19:00) (58 - 86)  BP: 119/58 (18 Mar 2024 19:00) (119/58 - 219/102)  BP(mean): 84 (18 Mar 2024 19:00) (84 - 109)  ABP: --  ABP(mean): --  RR: 27 (18 Mar 2024 19:00) (16 - 32)  SpO2: 97% (18 Mar 2024 19:00) (94% - 99%)    O2 Parameters below as of 18 Mar 2024 19:00  Patient On (Oxygen Delivery Method): room air                I&O's Summary      EXAM:     [place] Coma Scale: 15    General: normocephalic, atraumatic, laying in bed, in no distress  Neuro     MS: A/Ox3, cooperative, normal attention, no neglect, comprehension intact, speech with preserved fluency, naming, and repetition    CN: PERRL, VF FTC, EOMI and no ptosis bilaterally, sensation intact to crude touch V1-V3, (+)L. lower face weakness, hearing grossly intact    Mot: bulk normal, tone normal, power 5/5 in RUE/RLE (no drift), 4/5 in LUE/LLE (drift)    Chest: nonlabored respirations, no adventitious lung sounds bilaterally, heart regular rate/rhythm, present S1/S2, no murmurs or rubs  Abdomen: nondistended, soft and nontender without peritoneal signs, normoactive bowel sounds  Extremities: no clubbing, well-perfused, no edema    LABORATORY DATA:                            14.1   11.19 )-----------( 407      ( 18 Mar 2024 16:01 )             41.5     03-18    142  |  108  |  19  ----------------------------<  144<H>  4.2   |  25  |  0.88    Ca    10.3      18 Mar 2024 16:01    TPro  6.5  /  Alb  4.0  /  TBili  0.6  /  DBili  x   /  AST  17  /  ALT  13  /  AlkPhos  111  03-18    LIVER FUNCTIONS - ( 18 Mar 2024 16:01 )  Alb: 4.0 g/dL / Pro: 6.5 g/dL / ALK PHOS: 111 U/L / ALT: 13 U/L / AST: 17 U/L / GGT: x           PT/INR - ( 18 Mar 2024 16:01 )   PT: 11.2 sec;   INR: 0.98          PTT - ( 18 Mar 2024 16:01 )  PTT:28.0 sec    IMAGING DATA:    CARDIOLOGY DATA:    MICROBIOLOGY DATA:        MEDICATIONS  (STANDING):  sodium chloride 0.9%. 1000 milliLiter(s) (50 mL/Hr) IV Continuous <Continuous>    MEDICATIONS  (PRN):      ***********************************************  ASSESSMENT AND PLAN  ***********************************************    #Acute CVA, suspect lacunar infarct, s/p TNK @ 4:56 PM on 3/18/24  #Bilateral ICA stenosis  #5mm pulmonary nodule  #Reactive leukocytosis  #History of HTN, DM, COPD, angina pectoris    NEURO  - Admit NSICU, Q1h neuro checks / Q1h vital signs  - Stroke core measures, post TNK monitoring  - 24h CTH, MRI brain/fast w/o pj, MRA head/neck  - ASA 81 pending 24h scan, high intensity statin TBD based on lipid profile  - Bed rest for now, PT/OT, pain control    PULM  - Bronchodilators as needed, admission bicarb 25, admission CXR w/ good lung volumes, clear lung fields  - SpO2 goal > 92%, supplemental O2 and pulm toileting as needed    CARDIO  - BP goal: < 180/105 x24h post TNK, then NORMOTENSION  - TTE w/ bubble study, baseline cardia troponin, EKG    GI  - Diet: pending swallow evaluation  - Stress ulcer prophylaxis: not indicated  - Stool count, bowel regimen    /RENAL  - Monitor UOP/volume status, BUN/SCr    HEME  - Maintain Hb > 7.0, PLT > 10/20/50/100,000 depending on clinical scenario  - SCDs, holding VTE chemoppx as patient received TNK    ID  - Monitor for infectious s/s, fever curve, leukocytosis    ENDO  - Check A1c, glucose stabilizer/RASSI protocol < 180    03-18-24 @ 19:02

## 2024-03-18 NOTE — ED PROVIDER NOTE - PROGRESS NOTE DETAILS
while about to administer tnk sbp>200, remeasured multiple times on bilateral arms with persistent significant elevation. labetalol ordered.

## 2024-03-18 NOTE — PATIENT PROFILE ADULT - FALL HARM RISK - RISK INTERVENTIONS
Assistance with ambulation/Communicate Fall Risk and Risk Factors to all staff, patient, and family/Reinforce activity limits and safety measures with patient and family/Visual Cue: Yellow wristband/Bed in lowest position, wheels locked, appropriate side rails in place/Call bell, personal items and telephone in reach/Instruct patient to call for assistance before getting out of bed or chair/Non-slip footwear when patient is out of bed/[place] to call system/Physically safe environment - no spills, clutter or unnecessary equipment/Purposeful Proactive Rounding/Room/bathroom lighting operational, light cord in reach

## 2024-03-18 NOTE — STROKE CODE NOTE - NIH STROKE SCALE: 8. SENSORY, QM
General Sunscreen Counseling: I recommended a broad spectrum sunscreen with a SPF of 30 or higher.  I explained that SPF 30 sunscreens block approximately 97 percent of the sun's harmful rays.  Sunscreens should be applied at least 15 minutes prior to expected sun exposure and then every 2 hours after that as long as sun exposure continues. If swimming or exercising sunscreen should be reapplied every 45 minutes to an hour after getting wet or sweating.  One ounce, or the equivalent of a shot glass full of sunscreen, is adequate to protect the skin not covered by a bathing suit. I also recommended a lip balm with a sunscreen as well. Sun protective clothing can be used in lieu of sunscreen but must be worn the entire time you are exposed to the sun's rays.
Detail Level: Generalized
(1) Mild-to-moderate sensory loss; patient feels pinprick is less sharp or is dull on the affected side; or there is a loss of superficial pain with pinprick, but patient is aware of being touched

## 2024-03-18 NOTE — H&P ADULT - NSHPPHYSICALEXAM_GEN_ALL_CORE
Neurologic:  -Mental status: Awake, alert, oriented to person, place, and time. Speech is fluent with intact naming, repetition, and comprehension, hoarse voice but no dysarthria. Recent and remote memory intact. Follows commands. Attention/concentration intact.    -Cranial nerves:   II: Visual fields are full to confrontation.  III, IV, VI: Extraocular movements are intact without nystagmus. Pupils equally round and reactive to light. Bilateral cataract surgery.  V:  Facial sensation V1-V3 equal and intact   VII: L NLFF  VIII: Hearing is bilaterally intact to finger rub  IX, X: Uvula is midline and soft palate rises symmetrically  XI: Head turning and shoulder shrug are intact.  XII: Tongue protrudes midline  Motor: Normal bulk and tone. R side 5/5. LUE 3/5. LLE 2/5.  Sensation: dec sensation LUE/LLE. No neglect or extinction on double simultaneous testing.  Coordination: +dysmetria LUE   Gait: deferred    NIHSS Score: 8

## 2024-03-18 NOTE — H&P ADULT - ASSESSMENT
68y Female with PMHx of tobacco use disorder, HTN, COPD, DM, angina presenting to ED for SOB and L sided weakness/numbness. LKW 1pm when sx started. NIHSS 8. L NLFF, LUE 3/5, LLE 2/5, LUE/LLE numbness. CTH no acute intracranial hemorrhage, mass effect or large demarcated territorial infarction. Punctate linear density in the right cerebellar hemisphere likely representing calcification. Intracranial CTA: No large vessel occlusion. Supraclinoid left ICA aneurysm versus infundibulum. Extracranial CTA: Moderate stenosis the proximal bilateral internal carotid artery secondary to mixed calcified and noncalcified plaque. Moderate stenosis the proximal left subclavian artery. 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 5 mm right upper lobe pulmonary nodule. Case discussed with Dr. Araujo prior to urgent intervention. Risk and benefits of tenecteplase were discussed with patient including risk of symptomatic ICH/death. Decision was made that benefits outweighed risks and tenecteplase was administered at 1656. Paitient admitted to NSICU for post-tenecteplase monitoring.     Neuro  #Post-tenecteplase monitoring   - No antiplatelet, anticoagulation, and heparin sq until 24 hour CT head negative for bleed.  - Repeat CT head noncon 24 hours post-tenecteplase bolus to rule out bleed - 5pm on 3/19/24  - Stroke neuro checks and vitals every 15 minutes for first 2 hours post- tenecteplase bolus; every 30 minutes for the next 6 hours; then hourly until 24 hours post treatment  - Keep BP <180/105, notify provider if out of range  - Please perform dysphagia screen now, if passess may be NPO except meds for 6 hours post tenecteplase  - Repeat CT head with any acute change in mental status.  - Minimize arterial and venous punctures, able to draw blood 2hr s/p tenecteplase bolus  - Keep temp <100F  - Notify provider for "HR >100 or <55 or SaO2 <95%"  - Maintain strict bed rest for 12 hours with HOB <30 degrees  - After 12hr s/p tenecteplase, patient able to ambulate with assist    #CVA workup  - Once dysphagia screen passed, start atorvastatin 80 once daily  - Obtain stroke core labs: Hemoglobin A1c, Lipid profile set, and TSH  - MRI brain without contrast  - echo with bubble, may eventually need SARI  - PT/OT order wtihin 24 hours   - Swallow evaluation if fails dysphagia screen  - SLP order if patient with dysarthria  - Stroke education    Cards  #HTN  - goal sBP as above per post-tenecteplase protocol   - hold home blood pressure medication for now    #HLD  - high dose statin as above in CVA  - LDL results: pending    Pulm  - call provider if SPO2 < 94%    GI  #Nutrition/Fluids/Electrolytes   - replete K<4 and Mg <2  - Diet: NPO x 6 hours  - IVF: avoid fluids containing dextrose     Renal  - monitor and trend Cr    Infectious Disease  - jack    Endocrine  #DM  - A1C results: pending  - Maintain glucose 140-180  - SSI    - TSH results: pending    DVT Prophylaxis  - hold chemoprophylaxis s/p tenecteplase bolus  - SCDs for DVT prophylaxis     Dispo: Pending PT/OT evaluation     Discussed daily hospital plans and goals with patient.      Discussed with Dr. Araujo

## 2024-03-18 NOTE — H&P ADULT - NSHPLABSRESULTS_GEN_ALL_CORE
Fingerstick Blood Glucose: CAPILLARY BLOOD GLUCOSE  220 (18 Mar 2024 17:05)      POCT Blood Glucose.: 158 mg/dL (18 Mar 2024 16:16)    LABS:                        14.1   11.19 )-----------( 407      ( 18 Mar 2024 16:01 )             41.5     03-18    142  |  108  |  19  ----------------------------<  144<H>  4.2   |  25  |  0.88    Ca    10.3      18 Mar 2024 16:01    TPro  6.5  /  Alb  4.0  /  TBili  0.6  /  DBili  x   /  AST  17  /  ALT  13  /  AlkPhos  111  03-18    PT/INR - ( 18 Mar 2024 16:01 )   PT: 11.2 sec;   INR: 0.98          PTT - ( 18 Mar 2024 16:01 )  PTT:28.0 sec      Urinalysis Basic - ( 18 Mar 2024 16:01 )    Color: x / Appearance: x / SG: x / pH: x  Gluc: 144 mg/dL / Ketone: x  / Bili: x / Urobili: x   Blood: x / Protein: x / Nitrite: x   Leuk Esterase: x / RBC: x / WBC x   Sq Epi: x / Non Sq Epi: x / Bacteria: x        RADIOLOGY & ADDITIONAL STUDIES:  < from: CT Angio Neck Stroke Protocol w/ IV Cont (03.18.24 @ 16:39) >      IMPRESSION:    CT Head: No acute intracranial hemorrhage, mass effect or large   demarcated territorial infarction.    Punctate linear density in the right cerebellar hemisphere likely   representing calcification..      The last image was acquired on 3/18/2024 at 4:26 PM and the findings were   discussed with JEANIE Deutsch by Dr. Leland Abdi on 3/18/2024 4:31 PM    CT Perfusion:    Intracranial CTA:  1.  No large vessel occlusion.    2.  Supraclinoid left ICA aneurysm versus infundibulum.    Extracranial CTA:  1.  Moderate stenosis the proximal bilateral internal carotid artery   secondary to mixed calcified and noncalcified plaque.    2.  Moderate stenosis the proximal left subclavian artery. 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    3.  5 mm right upper lobe pulmonary nodule. Per Fleischner Society   guidelines, in a low-risk patient no follow-up is required and in a   high-risk patient optional CT chest in 12 months is recommended.    < end of copied text >

## 2024-03-18 NOTE — ED ADULT TRIAGE NOTE - WEIGHT IN LBS
After Visit Summary      Facility     Name Address Phone Fax    [facility] [address]  [place] [address] [phone]           Patient Discharge Summary   4/26/2017    Christien Cruz            Please bring this medication reconciliation form to your next doctor’s appointment(s). Please update the form if you stop taking any of these medications or you start taking any new medications including over the counter medications. Also please carry a copy of this form with you at all times in the event of an emergency.    A copy of these discharge instructions was reviewed with and given to the patient/patient representative/Guardian/Caregiver at discharge.          Allergies as of 4/26/2017        Reactions    Penicillins RASH    \"Lips swell and tingle\"    Sulfonamide Derivatives RASH    \"Lips swell and tingle\"    Cephalexin HIVES    Morphine Other (See Comments)    Pt does not get pain relief with this med.    Tape [Adhesive] RASH    Surgical tape used to hold stimulator in place.           Discharge Medications           Your Medications       Start Taking     OXYCODONE-ACETAMINOPHEN (PERCOCET) 7.5-325 MG PER TABLET Take 1 tablet by mouth every 6 hours as needed for Pain.    Notes:   --            Continue These Medications Which Have Not Changed     ALBUTEROL (VENTOLIN) 108 (90 BASE) MCG/ACT INHALER Inhale 1 puff into the lungs every 4 hours as needed.    Notes:   --          ALBUTEROL-IPRATROPIUM 2.5 MG/0.5 MG (DUONEB) 0.5-2.5 (3) MG/3ML NEBULIZER SOLUTION Take 3 mLs by nebulization every 6 hours as needed for Wheezing.    Notes:   --          ALPRAZOLAM (XANAX) 1 MG TABLET One po tid    Notes:   Not to exceed 5 additional fills before 08/03/2017.          CLONAZEPAM (KLONOPIN) 1 MG TABLET Take 3 mg nightly as needed DUE 10/11/15    Notes:   Faxed to [facility]          FLUTICASONE FUROATE-VILANTEROL (BREO ELLIPTA) 100-32  MCG/INH DISKUS INHALER Inhale 1 puff into the lungs once daily.    Notes:   --          GABAPENTIN (NEURONTIN) 400 MG CAPSULE Take 1 capsule by mouth 4 times daily.    Notes:   --          LEVETIRACETAM (KEPPRA) 750 MG TABLET Take 1 tablet by mouth 2 times daily.    Notes:   --          MEDROXYPROGESTERONE (PROVERA) 10 MG TABLET Take 1 tablet by mouth daily.    Notes:   --          MONTELUKAST (SINGULAIR) 10 MG TABLET Take 10 mg by mouth nightly.    Notes:   --          OLOPATADINE HCL (PATADAY) 0.2 % OPHTHALMIC SOLUTION Place 1 drop into both eyes daily.    Notes:   --          QUETIAPINE (SEROQUEL) 50 MG TABLET 3 po at hs    Notes:   --          SENNOSIDES-DOCUSATE SODIUM (SENOKOT-S) 8.6-50 MG TABLET Take 2 tablets by mouth nightly as needed for Constipation.    Notes:   --          TOPIRAMATE (TOPAMAX) 100 MG TABLET Take 1 tablet by mouth nightly.    Notes:   --          TRAZODONE (DESYREL) 100 MG TABLET 4 po at hs    Notes:   --          UNABLE TO FIND 8/31/15 medication contract signed with Dr. Daly    Notes:   --          ZOLPIDEM (AMBIEN) 5 MG TABLET TAKE 1 TABLET EVERYDAY AT BEDTIME    Notes:   Not to exceed 4 additional fills before 07/15/2017            These Medications Have Changed     No Medications Reported      Stop taking these medications, discuss with your pharmacist     OXYCODONE/APAP (PERCOCET) 5-325 MG PER TABLET Take 1 tablet by mouth every 6 hours as needed for Pain.    Notes:   --          TAPENTADOL HCL (NUCYNTA) 150 MG TABLET SR 12 HR Take 1 tablet by mouth every 12 hours.    Notes:   --            Facility Administered Medications     No Medications Reported      Follow-up Instructions     Return in about 2 months (around 6/26/2017) for follow up.      Your To Do List     5/2/2017 10:00 AM     Appointment with [facility] TECH 2 at [facility] ([phone])       5/8/2017 3:00 PM     Appointment with Dee Dee Landon MD; [facility] 2 at [facility]  ([phone])       5/11/2017 11:00 AM     Appointment with Gabino Bustamante MD at [facility] [address] ([phone])       6/21/2017 11:30 AM     Appointment with Jonas Hobson MD at [facility] ([phone])       7/5/2017 11:15 AM     Appointment with Jose Miguel Daly MD at [facility] ([phone])       3/13/2018 10:00 AM     Appointment with [facility] TECH 2 at [facility] ([phone])       3/19/2018 10:15 AM     Appointment with Dee Dee Landon MD; [facility] TECH 3 at [facility] ([phone])           Discharge Instructions       INFORMATION YOUR PROVIDER WANTS YOU TO KNOW    Thank you for choosing Dr. Jose Miguel Daly at [facility]. It was a pleasure to care for you today!    Clinic Policy:  Cancellation and No Show: If you must cancel a visit, please give minimally 24 hours notice so we can accommodate other patients that have been waiting to be seen. Do not rely on a reminder call for your appointment as it is a courtesy and not guaranteed. Please write it on your calendar. You are responsible to be at all scheduled appointments. Any appointment cancelled less than 24 hours prior to start time will be considered a “No Show/Late Cancellation”.      You can cancel your appointment by calling our office at [phone] during our normal business hours which are as follows:  Monday-Thursday 8:00 am to 4:30 pm  Friday 8:00 am to 2:00 pm     Messages:  Messages left for Dr. Jose Miguel Daly will be returned within 24-48 business hours.     Medication Requests:  We need up to 7 business days notice for refills to be completed. Please contact your preferred pharmacy for all non-narcotic refills. The pharmacy will contact the office for those refills. It is imperative that you plan ahead as we are unable to guarantee your refill by a certain date if this is not followed. If you  miss appointments, you may not get a refill. When calling for refills or other concerns, please take into consideration that we are closed for holidays.    No medication changes are made over the phone, if you feel that a medication change is needed, please make an appointment.    Test results:  Any tests ordered by Dr. Daly will be reviewed at your next appointment.    Forms (FMLA/ Disability):  FMLA/disability forms will need an appointment to be completed. Please complete as much as possible on any forms prior to bringing them to your appointment. This includes adding a telephone number where you can be reached during normal business hours. Please note that if you are requesting disability paperwork, you will have to complete a Functional Capacity Evaluation. This involves an evaluation that lasts approximately 4 hours and you will be responsible to call your insurance company to verify it is a covered expense.    Your opinion matters!  Our desire is to increase your overall state of wellness and improve your quality of life with individualized patient care and innovative interventional modalities.    In the next few weeks, you may receive a Press datangoey survey regarding your clinic visit with us today. Your responses on this survey help us to maintain and improve the care we provide to you! We look forward to hearing from you!           Discharge References/Attachments     None      Prescription Receipt for this Admission (1 Prescription)                   Other Prescriptions                Printed (1 of 1)         oxycodone-acetaminophen (PERCOCET) 7.5-325 MG per tablet                 Your Opinion Matters To Us  If you receive a patient satisfaction survey in the mail, please complete and return it in the postage-paid envelope.    We truly value and appreciate your feedback.           Christine Cruz        Your Current Medications Are        Details    oxycodone-acetaminophen (PERCOCET) 7.5-325 MG per  tablet Take 1 tablet by mouth every 6 hours as needed for Pain.    topiramate (TOPAMAX) 100 MG tablet Take 1 tablet by mouth nightly.    zolpidem (AMBIEN) 5 MG tablet TAKE 1 TABLET EVERYDAY AT BEDTIME    ALPRAZolam (XANAX) 1 MG tablet One po tid    clonazePAM (KLONOPIN) 1 MG tablet Take 3 mg nightly as needed DUE 10/11/15    QUEtiapine (SEROQUEL) 50 MG tablet 3 po at hs    traZODONE (DESYREL) 100 MG tablet 4 po at hs    gabapentin (NEURONTIN) 400 MG capsule Take 1 capsule by mouth 4 times daily.    sennosides-docusate sodium (SENOKOT-S) 8.6-50 MG tablet Take 2 tablets by mouth nightly as needed for Constipation.    levetiracetam (KEPPRA) 750 MG tablet Take 1 tablet by mouth 2 times daily.    medroxyPROGESTERone (PROVERA) 10 MG tablet Take 1 tablet by mouth daily.    UNABLE TO FIND 8/31/15 medication contract signed with Dr. Daly    fluticasone furoate-vilanterol (BREO ELLIPTA) 100-25 MCG/INH diskus inhaler Inhale 1 puff into the lungs once daily.    Olopatadine HCl (PATADAY) 0.2 % ophthalmic solution Place 1 drop into both eyes daily.    albuterol-ipratropium 2.5 mg/0.5 mg (DUONEB) 0.5-2.5 (3) MG/3ML nebulizer solution Take 3 mLs by nebulization every 6 hours as needed for Wheezing.    montelukast (SINGULAIR) 10 MG tablet Take 10 mg by mouth nightly.    albuterol (VENTOLIN) 108 (90 BASE) MCG/ACT inhaler Inhale 1 puff into the lungs every 4 hours as needed.       119

## 2024-03-18 NOTE — ED ADULT TRIAGE NOTE - CHIEF COMPLAINT QUOTE
Pt, with hx of COPD, DM and HTN, presents to ER c/o sudden onset of SOB with associated generalized weakness while waiting for bus at terminal. Pt denies any other associated symptoms at this time.

## 2024-03-18 NOTE — ED PROVIDER NOTE - CRITICAL CARE ATTENDING CONTRIBUTION TO CARE
I have spent the above time ordering and reviewing of laboratory, radiology, and cardiac tests, discussing the patient with consultants, having discussions with the aptient, and monitoring for potential decompensation

## 2024-03-18 NOTE — ED PROVIDER NOTE - CLINICAL SUMMARY MEDICAL DECISION MAKING FREE TEXT BOX
Code stroke called, Given history and exam, I have low suspicion for infectious etiology, neurological changes secondary to toxicologic ingestions, seizures, or complex migraine. Presentation concerning for possible CVA requiring workup. cth negative for  bleed, d/w stroke team, recommending TNK. patient without contraindications. delay in ed triage time to tnk administration due to delay in stroke code activation as deficits were not appreciated until exam later on, not in triage.

## 2024-03-19 ENCOUNTER — RESULT REVIEW (OUTPATIENT)
Age: 69
End: 2024-03-19

## 2024-03-19 LAB
A1C WITH ESTIMATED AVERAGE GLUCOSE RESULT: 6.5 % — HIGH (ref 4–5.6)
ANION GAP SERPL CALC-SCNC: 9 MMOL/L — SIGNIFICANT CHANGE UP (ref 5–17)
BUN SERPL-MCNC: 15 MG/DL — SIGNIFICANT CHANGE UP (ref 7–23)
CALCIUM SERPL-MCNC: 9.4 MG/DL — SIGNIFICANT CHANGE UP (ref 8.4–10.5)
CHLORIDE SERPL-SCNC: 113 MMOL/L — HIGH (ref 96–108)
CHOLEST SERPL-MCNC: 198 MG/DL — SIGNIFICANT CHANGE UP
CO2 SERPL-SCNC: 21 MMOL/L — LOW (ref 22–31)
CREAT SERPL-MCNC: 0.78 MG/DL — SIGNIFICANT CHANGE UP (ref 0.5–1.3)
EGFR: 84 ML/MIN/1.73M2 — SIGNIFICANT CHANGE UP
ESTIMATED AVERAGE GLUCOSE: 140 MG/DL — HIGH (ref 68–114)
GLUCOSE SERPL-MCNC: 105 MG/DL — HIGH (ref 70–99)
HCT VFR BLD CALC: 37.2 % — SIGNIFICANT CHANGE UP (ref 34.5–45)
HCV AB S/CO SERPL IA: 0.04 S/CO — SIGNIFICANT CHANGE UP
HCV AB SERPL-IMP: SIGNIFICANT CHANGE UP
HDLC SERPL-MCNC: 37 MG/DL — LOW
HGB BLD-MCNC: 12.5 G/DL — SIGNIFICANT CHANGE UP (ref 11.5–15.5)
LIPID PNL WITH DIRECT LDL SERPL: 141 MG/DL — HIGH
MAGNESIUM SERPL-MCNC: 2.3 MG/DL — SIGNIFICANT CHANGE UP (ref 1.6–2.6)
MCHC RBC-ENTMCNC: 31.4 PG — SIGNIFICANT CHANGE UP (ref 27–34)
MCHC RBC-ENTMCNC: 33.6 GM/DL — SIGNIFICANT CHANGE UP (ref 32–36)
MCV RBC AUTO: 93.5 FL — SIGNIFICANT CHANGE UP (ref 80–100)
NON HDL CHOLESTEROL: 161 MG/DL — HIGH
NRBC # BLD: 0 /100 WBCS — SIGNIFICANT CHANGE UP (ref 0–0)
PHOSPHATE SERPL-MCNC: 3.4 MG/DL — SIGNIFICANT CHANGE UP (ref 2.5–4.5)
PLATELET # BLD AUTO: 343 K/UL — SIGNIFICANT CHANGE UP (ref 150–400)
POTASSIUM SERPL-MCNC: 3.6 MMOL/L — SIGNIFICANT CHANGE UP (ref 3.5–5.3)
POTASSIUM SERPL-SCNC: 3.6 MMOL/L — SIGNIFICANT CHANGE UP (ref 3.5–5.3)
RBC # BLD: 3.98 M/UL — SIGNIFICANT CHANGE UP (ref 3.8–5.2)
RBC # FLD: 12.5 % — SIGNIFICANT CHANGE UP (ref 10.3–14.5)
SODIUM SERPL-SCNC: 143 MMOL/L — SIGNIFICANT CHANGE UP (ref 135–145)
TRIGL SERPL-MCNC: 100 MG/DL — SIGNIFICANT CHANGE UP
TSH SERPL-MCNC: 0.75 UIU/ML — SIGNIFICANT CHANGE UP (ref 0.27–4.2)
WBC # BLD: 7.82 K/UL — SIGNIFICANT CHANGE UP (ref 3.8–10.5)
WBC # FLD AUTO: 7.82 K/UL — SIGNIFICANT CHANGE UP (ref 3.8–10.5)

## 2024-03-19 PROCEDURE — 70450 CT HEAD/BRAIN W/O DYE: CPT | Mod: 26

## 2024-03-19 PROCEDURE — 93306 TTE W/DOPPLER COMPLETE: CPT | Mod: 26

## 2024-03-19 PROCEDURE — 99291 CRITICAL CARE FIRST HOUR: CPT

## 2024-03-19 RX ORDER — HYDRALAZINE HCL 50 MG
10 TABLET ORAL EVERY 4 HOURS
Refills: 0 | Status: DISCONTINUED | OUTPATIENT
Start: 2024-03-19 | End: 2024-03-22

## 2024-03-19 RX ORDER — LABETALOL HCL 100 MG
10 TABLET ORAL ONCE
Refills: 0 | Status: COMPLETED | OUTPATIENT
Start: 2024-03-19 | End: 2024-03-19

## 2024-03-19 RX ORDER — POTASSIUM CHLORIDE 20 MEQ
40 PACKET (EA) ORAL ONCE
Refills: 0 | Status: COMPLETED | OUTPATIENT
Start: 2024-03-19 | End: 2024-03-19

## 2024-03-19 RX ORDER — SODIUM CHLORIDE 9 MG/ML
1000 INJECTION, SOLUTION INTRAVENOUS
Refills: 0 | Status: DISCONTINUED | OUTPATIENT
Start: 2024-03-19 | End: 2024-03-19

## 2024-03-19 RX ORDER — SENNA PLUS 8.6 MG/1
2 TABLET ORAL AT BEDTIME
Refills: 0 | Status: DISCONTINUED | OUTPATIENT
Start: 2024-03-19 | End: 2024-03-22

## 2024-03-19 RX ORDER — TRAMADOL HYDROCHLORIDE 50 MG/1
25 TABLET ORAL ONCE
Refills: 0 | Status: DISCONTINUED | OUTPATIENT
Start: 2024-03-19 | End: 2024-03-19

## 2024-03-19 RX ORDER — ASPIRIN/CALCIUM CARB/MAGNESIUM 324 MG
81 TABLET ORAL DAILY
Refills: 0 | Status: DISCONTINUED | OUTPATIENT
Start: 2024-03-19 | End: 2024-03-22

## 2024-03-19 RX ORDER — CLOPIDOGREL BISULFATE 75 MG/1
75 TABLET, FILM COATED ORAL DAILY
Refills: 0 | Status: DISCONTINUED | OUTPATIENT
Start: 2024-03-19 | End: 2024-03-22

## 2024-03-19 RX ORDER — INFLUENZA VIRUS VACCINE 15; 15; 15; 15 UG/.5ML; UG/.5ML; UG/.5ML; UG/.5ML
0.7 SUSPENSION INTRAMUSCULAR ONCE
Refills: 0 | Status: DISCONTINUED | OUTPATIENT
Start: 2024-03-19 | End: 2024-03-22

## 2024-03-19 RX ORDER — ENOXAPARIN SODIUM 100 MG/ML
40 INJECTION SUBCUTANEOUS EVERY 24 HOURS
Refills: 0 | Status: DISCONTINUED | OUTPATIENT
Start: 2024-03-19 | End: 2024-03-22

## 2024-03-19 RX ORDER — LIDOCAINE 4 G/100G
1 CREAM TOPICAL EVERY 24 HOURS
Refills: 0 | Status: DISCONTINUED | OUTPATIENT
Start: 2024-03-19 | End: 2024-03-22

## 2024-03-19 RX ORDER — ATORVASTATIN CALCIUM 80 MG/1
80 TABLET, FILM COATED ORAL AT BEDTIME
Refills: 0 | Status: DISCONTINUED | OUTPATIENT
Start: 2024-03-19 | End: 2024-03-22

## 2024-03-19 RX ORDER — SODIUM CHLORIDE 9 MG/ML
1000 INJECTION, SOLUTION INTRAVENOUS
Refills: 0 | Status: DISCONTINUED | OUTPATIENT
Start: 2024-03-19 | End: 2024-03-22

## 2024-03-19 RX ADMIN — Medication 81 MILLIGRAM(S): at 19:24

## 2024-03-19 RX ADMIN — TRAMADOL HYDROCHLORIDE 25 MILLIGRAM(S): 50 TABLET ORAL at 23:53

## 2024-03-19 RX ADMIN — Medication 1 PATCH: at 18:45

## 2024-03-19 RX ADMIN — ENOXAPARIN SODIUM 40 MILLIGRAM(S): 100 INJECTION SUBCUTANEOUS at 19:24

## 2024-03-19 RX ADMIN — CLOPIDOGREL BISULFATE 75 MILLIGRAM(S): 75 TABLET, FILM COATED ORAL at 19:24

## 2024-03-19 RX ADMIN — Medication 10 MILLIGRAM(S): at 17:08

## 2024-03-19 RX ADMIN — Medication 10 MILLIGRAM(S): at 19:25

## 2024-03-19 RX ADMIN — Medication 1 PATCH: at 11:28

## 2024-03-19 RX ADMIN — LIDOCAINE 1 PATCH: 4 CREAM TOPICAL at 23:53

## 2024-03-19 RX ADMIN — Medication 1 PATCH: at 06:18

## 2024-03-19 RX ADMIN — Medication 10 MILLIGRAM(S): at 10:10

## 2024-03-19 RX ADMIN — SODIUM CHLORIDE 60 MILLILITER(S): 9 INJECTION, SOLUTION INTRAVENOUS at 10:11

## 2024-03-19 NOTE — PHYSICAL THERAPY INITIAL EVALUATION ADULT - IMPAIRMENTS FOUND, PT EVAL
aerobic capacity/endurance/arousal, attention, and cognition/fine motor/gait, locomotion, and balance/muscle strength/posture

## 2024-03-19 NOTE — OCCUPATIONAL THERAPY INITIAL EVALUATION ADULT - MODALITIES TREATMENT COMMENTS
Cranial Nerves II - XII: II: PERRLA; visual fields are full to confrontation III, IV, VI: EOMI, no nystagmus appreciated V: facial sensation intact to light touch V1-V3 b/l VII: no ptosis, no facial droop, symmetric eyebrow raise and closure VIII: hearing intact to finger rub b/l  XI: head turning and shoulder shrug intact b/l XII: tongue protrusion midline // Pt able to perform bed mobility with Min Ax1 2/2 impaired LUE/LLE strength. Pt performed UB dressing don gown while sitting EOB, requiring Min Ax1 2/2 LUE weakness with impaired functional reach/grasp. Pt participated in MMT and neuro assessment while seated at EOB, vitals checked with pt hypertensive SBP to 187. Case discussed with JEANIE Harrison (nsx) and OOB mobility/ADLs and further activity deferred at this time. Pt returned to bed and left as found, +all lines, +call bell, SBP decreased to 170s and VSS, DILEEP Gómez made aware.

## 2024-03-19 NOTE — SWALLOW BEDSIDE ASSESSMENT ADULT - PHARYNGEAL PHASE
Seemingly timely swallow, hyolaryngeal movement appreciated, with immediate cough noted post the swallow across all consistencies. No gross clinical indicators of pharyngeal inefficiency.

## 2024-03-19 NOTE — PROGRESS NOTE ADULT - SUBJECTIVE AND OBJECTIVE BOX
=================================  NEUROCRITICAL CARE ATTENDING NOTE  =================================    VICKY ALAMO   MRN-[id number]  Summary:  68y/F with PMHx of tobacco use disorder, HTN, COPD, DM, angina presenting to ED for SOB and L sided weakness/numbness. LKW 1pm when sx started. NIHSS 8. L NLFF, LUE 3/5, LLE 2/5, LUE/LLE numbness. CTH no acute intracranial hemorrhage, mass effect or large demarcated territorial infarction. Punctate linear density in the right cerebellar hemisphere likely representing calcification. Intracranial CTA: No large vessel occlusion. Supraclinoid left ICA aneurysm versus infundibulum. Extracranial CTA: Moderate stenosis the proximal bilateral internal carotid artery secondary to mixed calcified and noncalcified plaque. Moderate stenosis the proximal left subclavian artery. 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 5 mm right upper lobe pulmonary nodule. Case discussed with Dr. Araujo prior to urgent intervention. Risk and benefits of tenecteplase were discussed with patient including risk of symptomatic ICH/death. Decision was made that benefits outweighed risks and tenecteplase was administered at 1656.    PAST MEDICAL & SURGICAL HISTORY: History of angina Back pain FAMILY HISTORY: T(C): 36.5 (03-18-24 @ 15:05), Max: 36.5 (03-18-24 @ 15:05) HR: 70 (03-18-24 @ 16:55) (70 - 86) BP: 153/73 (03-18-24 @ 17:00) (147/83 - 219/102) RR: 18 (03-18-24 @ 16:55) (16 - 18) SpO2: 97% (03-18-24 @ 16:55) (94% - 98%)   MEDICATION RECONCILIATION  MEDICATIONS  (STANDING): labetalol Injectable 20 milliGRAM(s) IV Push Once tenecteplase 50 milliGRAM(s) Injectable (Rx Quick Charge). 36 milliGRAM(s) Waste  tenecteplase Injectable. 14 milliGRAM(s) IV Push. Once  Vital Signs Last 24 Hrs T(C): 36.5 (18 Mar 2024 15:05), Max: 36.5 (18 Mar 2024 15:05) T(F): 97.7 (18 Mar 2024 15:05), Max: 97.7 (18 Mar 2024 15:05) HR: 70 (18 Mar 2024 16:55) (70 - 86) BP: 153/73 (18 Mar 2024 17:00) (147/83 - 219/102) BP(mean): -- RR: 18 (18 Mar 2024 16:55) (16 - 18) SpO2: 97% (18 Mar 2024 16:55) (94% - 98%)  Parameters below as of 18 Mar 2024 16:55 Patient On (Oxygen Delivery Method): room air  (18 Mar 2024 17:09)    COURSE IN THE HOSPITAL:    Past Medical History: History of angina Back pain  Allergies:  No Known Allergies  Home meds:   ·	Mobic 7.5 mg oral tablet: 1 tab(s) orally once a day     PHYSICAL EXAMINATION  T(C): 36.7 (03-19 @ 05:43), Max: 36.8 (03-18 @ 18:02) HR: 62 (03-19 @ 07:00) (55 - 86) BP: 146/66 (03-19 @ 07:00) (95/48 - 219/102) RR: 21 (03-19 @ 07:00) (16 - 32) SpO2: 92% (03-19 @ 07:00) (91% - 99%)  NEUROLOGIC EXAMINATION:  Patient is awake, alert, fully oriented, pupils 2-3mm equal and briskly reactive to light, EOMs intact, muscle strength 5/5 on all 4s.  GENERAL: not intubated, not in cardiorespiratory distress  EENT:  anicteric  CARDIOVASCULAR: (+) S1 S2, normal rate and regular rhythm  PULMONARY: clear to auscultation bilaterally  ABDOMEN: soft, nontender with normoactive bowel sounds  EXTREMITIES: no edema  SKIN: no rash    LABS:  CAPILLARY BLOOD GLUCOSE 158 220               12.5   7.82  )-----------( 343      ( 19 Mar 2024 05:30 )             37.2     143  |  113<H>  |  15  ----------------------------<  105<H>  3.6   |  21<L>  |  0.78    Ca    9.4      19 Mar 2024 05:30  Phos  3.4     03-19  Mg     2.3     03-19    TPro  6.5  /  Alb  4.0  /  TBili  0.6  /  DBili  x   /  AST  17  /  ALT  13  /  AlkPhos  111  03-18 03-18 @ 07:01 - 03-19 @ 07:00  --------------------------------------------------------  IN: 750 mL / OUT: 500 mL / NET: 250 mL    03-19 @ 07:01  - 03-19 @ 07:10  --------------------------------------------------------  IN: 50 mL / OUT: 0 mL / NET: 50 mL    Bacteriology:  CSF studies:  EEG:  Neuroimaging:  Other imaging:    MEDICATIONS: 03-19    ·	nicotine -  14 mG/24Hr(s) Patch 1 Transdermal daily  ·	sodium chloride 0.9%. 1000 IV Continuous <Continuous>    IV FLUIDS:  DRIPS:  DIET:  Lines:  Drains:    Wounds:    CODE STATUS:  Full Code                       GOALS OF CARE:  aggressive                      DISPOSITION:  ICU =================================  NEUROCRITICAL CARE ATTENDING NOTE  =================================    VICKY ALAMO   MRN-[id number]  Summary:  68y/F with PMHx of tobacco use disorder, HTN, COPD, DM, angina presenting to ED for SOB and L sided weakness/numbness. LKW 1pm when sx started. NIHSS 8. L NLFF, LUE 3/5, LLE 2/5, LUE/LLE numbness. CTH no acute intracranial hemorrhage, mass effect or large demarcated territorial infarction. Punctate linear density in the right cerebellar hemisphere likely representing calcification. Intracranial CTA: No large vessel occlusion. Supraclinoid left ICA aneurysm versus infundibulum. Extracranial CTA: Moderate stenosis the proximal bilateral internal carotid artery secondary to mixed calcified and noncalcified plaque. Moderate stenosis the proximal left subclavian artery. 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 5 mm right upper lobe pulmonary nodule. Case discussed with Dr. Araujo prior to urgent intervention. Risk and benefits of tenecteplase were discussed with patient including risk of symptomatic ICH/death. Decision was made that benefits outweighed risks and tenecteplase was administered at 1656.    PAST MEDICAL & SURGICAL HISTORY: History of angina Back pain FAMILY HISTORY: T(C): 36.5 (03-18-24 @ 15:05), Max: 36.5 (03-18-24 @ 15:05) HR: 70 (03-18-24 @ 16:55) (70 - 86) BP: 153/73 (03-18-24 @ 17:00) (147/83 - 219/102) RR: 18 (03-18-24 @ 16:55) (16 - 18) SpO2: 97% (03-18-24 @ 16:55) (94% - 98%)   MEDICATION RECONCILIATION  MEDICATIONS  (STANDING): labetalol Injectable 20 milliGRAM(s) IV Push Once tenecteplase 50 milliGRAM(s) Injectable (Rx Quick Charge). 36 milliGRAM(s) Waste  tenecteplase Injectable. 14 milliGRAM(s) IV Push. Once  Vital Signs Last 24 Hrs T(C): 36.5 (18 Mar 2024 15:05), Max: 36.5 (18 Mar 2024 15:05) T(F): 97.7 (18 Mar 2024 15:05), Max: 97.7 (18 Mar 2024 15:05) HR: 70 (18 Mar 2024 16:55) (70 - 86) BP: 153/73 (18 Mar 2024 17:00) (147/83 - 219/102) BP(mean): -- RR: 18 (18 Mar 2024 16:55) (16 - 18) SpO2: 97% (18 Mar 2024 16:55) (94% - 98%)  Parameters below as of 18 Mar 2024 16:55 Patient On (Oxygen Delivery Method): room air  (18 Mar 2024 17:09)    COURSE IN THE HOSPITAL:  03/18 TNK at 4:56 p.m., NIHSS 8  03/19 No significant events overnight.     Past Medical History: History of angina Back pain  Allergies:  No Known Allergies  Home meds:   ·	Mobic 7.5 mg oral tablet: 1 tab(s) orally once a day     PHYSICAL EXAMINATION  T(C): 36.7 (03-19 @ 05:43), Max: 36.8 (03-18 @ 18:02) HR: 62 (03-19 @ 07:00) (55 - 86) BP: 146/66 (03-19 @ 07:00) (95/48 - 219/102) RR: 21 (03-19 @ 07:00) (16 - 32) SpO2: 92% (03-19 @ 07:00) (91% - 99%)  NEUROLOGIC EXAMINATION:  Patient is awake, alert, fully oriented, pupils 2-3mm equal and briskly reactive to light, EOMs intact, moving all 4s with good strength weaker L UE/LE 4/5 with L UE drift; trace L droop  GENERAL: not intubated, not in cardiorespiratory distress  EENT:  anicteric  CARDIOVASCULAR: (+) S1 S2, normal rate and regular rhythm  PULMONARY: clear to auscultation bilaterally  ABDOMEN: soft, nontender with normoactive bowel sounds  EXTREMITIES: no edema  SKIN: no rash    LABS:  CAPILLARY BLOOD GLUCOSE 158 220               12.5   7.82  )-----------( 343      ( 19 Mar 2024 05:30 )             37.2     143  |  113<H>  |  15  ----------------------------<  105<H>  3.6   |  21<L>  |  0.78    Ca    9.4      19 Mar 2024 05:30  Phos  3.4     03-19  Mg     2.3     03-19    TPro  6.5  /  Alb  4.0  /  TBili  0.6  /  DBili  x   /  AST  17  /  ALT  13  /  AlkPhos  111  03-18 03-18 @ 07:01  -  03-19 @ 07:00  --------------------------------------------------------  IN: 750 mL / OUT: 500 mL / NET: 250 mL    03-19 @ 07:01  - 03-19 @ 07:10  --------------------------------------------------------  IN: 50 mL / OUT: 0 mL / NET: 50 mL    HbA1C = 6.5 (03-19)  LDL = 141 (03-19)   HDL = 37 (03-19)  TG = 100 (03-19)   TSH = 0.747 (03-19)    Bacteriology:  CSF studies:  EEG:  Neuroimaging:  Other imaging:    MEDICATIONS: 03-19    ·	nicotine -  14 mG/24Hr(s) Patch 1 Transdermal daily    IV FLUIDS: NS@50cc/hr  DRIPS:  DIET: regular   Lines:  Drains:    Wounds:    CODE STATUS:  Full Code                       GOALS OF CARE:  aggressive                      DISPOSITION:  ICU

## 2024-03-19 NOTE — OCCUPATIONAL THERAPY INITIAL EVALUATION ADULT - DIAGNOSIS, OT EVAL
Pt p/w impaired LUE/LLE strength, impaired LUE/LLE sensation, impaired sitting balance, and impaired functional activity tolerance, impacting ability to perform functional mobility/ADLs.

## 2024-03-19 NOTE — OCCUPATIONAL THERAPY INITIAL EVALUATION ADULT - ADDITIONAL COMMENTS
Pt reporting that she is currently undomiciled and staying in hotels, while "traveling" in search of a home. Pt reporting that PTA, she was able to perform all mobility/ADLs independently and w/o use of AD/AE. Pt is R hand dominant.

## 2024-03-19 NOTE — SWALLOW BEDSIDE ASSESSMENT ADULT - COMMENTS
RN and medical team denied noting overt swallowing difficulty. Patient reported cough with oral intake that is new.

## 2024-03-19 NOTE — SWALLOW BEDSIDE ASSESSMENT ADULT - SWALLOW EVAL: DIAGNOSIS
Functional oral phase. Clinical indicators of penetration/aspiration noted across oral intake. No gross clinical indicators of pharyngeal inefficiency. Oral motor exam unremarkable. Moderate rough vocal quality. No dysarthria. Clinical presentation of unclear etiology. Recommend objective assessment to further assess swallow function- FEES vs MBSS.

## 2024-03-19 NOTE — OCCUPATIONAL THERAPY INITIAL EVALUATION ADULT - LEVEL OF INDEPENDENCE: DRESS UPPER BODY, OT EVAL
don gown while sitting EOB, requiring Min Ax1 2/2 LUE weakness with impaired functional reach/grasp./minimum assist (75% patients effort)

## 2024-03-19 NOTE — PROGRESS NOTE ADULT - ASSESSMENT
68y Female with PMHx of tobacco use disorder, HTN, COPD, DM, angina presenting to ED for SOB and L sided weakness/numbness. LKW 1pm when sx started. NIHSS 8. L NLFF, LUE 3/5, LLE 2/5, LUE/LLE numbness. CTH no acute intracranial hemorrhage, mass effect or large demarcated territorial infarction. Punctate linear density in the right cerebellar hemisphere likely representing calcification. Intracranial CTA: No large vessel occlusion. Supraclinoid left ICA aneurysm versus infundibulum. Extracranial CTA: Moderate stenosis the proximal bilateral internal carotid artery secondary to mixed calcified and noncalcified plaque. Moderate stenosis the proximal left subclavian artery. 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 5 mm right upper lobe pulmonary nodule. Case discussed with Dr. Araujo prior to urgent intervention. Risk and benefits of tenecteplase were discussed with patient including risk of symptomatic ICH/death. Decision was made that benefits outweighed risks and tenecteplase was administered at 1656. Paitient admitted to NSICU for post-tenecteplase monitoring.     Neuro  #Post-tenecteplase monitoring   - No antiplatelet, anticoagulation, and heparin sq until 24 hour CT head negative for bleed.  - Repeat CT head noncon 24 hours post-tenecteplase bolus to rule out bleed - 5pm on 3/19/24  - Stroke neuro checks and vitals every 15 minutes for first 2 hours post- tenecteplase bolus; every 30 minutes for the next 6 hours; then hourly until 24 hours post treatment  - Keep BP <180/105, notify provider if out of range  - Please perform dysphagia screen now, if passess may be NPO except meds for 6 hours post tenecteplase  - Repeat CT head with any acute change in mental status.  - Minimize arterial and venous punctures, able to draw blood 2hr s/p tenecteplase bolus  - Keep temp <100F  - Notify provider for "HR >100 or <55 or SaO2 <95%"  - Maintain strict bed rest for 12 hours with HOB <30 degrees  - After 12hr s/p tenecteplase, patient able to ambulate with assist    #CVA workup  - Once dysphagia screen passed, start atorvastatin 80 once daily  - Obtain stroke core labs: Hemoglobin A1c 6.5, Lipid profile set , and TSH 0.747  - MRI brain without contrast  - echo with bubble unremarkable, may eventually need SARI  - PT/OT order wtihin 24 hours   - Swallow evaluation if fails dysphagia screen  - SLP order if patient with dysarthria  - Stroke education    Cards  #HTN  - goal sBP as above per post-tenecteplase protocol   - hold home blood pressure medication for now    #HLD  - high dose statin as above in CVA  - LDL results: 141    Pulm  - call provider if SPO2 < 94%    GI  #Nutrition/Fluids/Electrolytes   - replete K<4 and Mg <2  - Diet: CC diet  - IVF: avoid fluids containing dextrose     Renal  - monitor and trend Cr    Infectious Disease  - jack    Endocrine  #DM  - A1C results: 6.5  - Maintain glucose 140-180  - SSI    - TSH results: 0.747    DVT Prophylaxis  - hold chemoprophylaxis s/p tenecteplase    - SCDs for DVT prophylaxis     Dispo: Pending PT/OT evaluation     Discussed daily hospital plans and goals with patient.      Discussed with Dr. Araujo and Dr. Finley     68y Female with PMHx of tobacco use disorder, HTN, COPD, DM, angina presenting to ED for SOB and L sided weakness/numbness. LKW 1pm on 3/18 when sx started. NIHSS 8. L NLFF, LUE 3/5, LLE 2/5, LUE/LLE numbness. CTH no acute intracranial hemorrhage, mass effect or large demarcated territorial infarction. Punctate linear density in the right cerebellar hemisphere likely representing calcification. Intracranial CTA: No large vessel occlusion. Supraclinoid left ICA aneurysm versus infundibulum. Extracranial CTA: Moderate stenosis the proximal bilateral internal carotid artery secondary to mixed calcified and noncalcified plaque. Moderate stenosis the proximal left subclavian artery. 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 5 mm right upper lobe pulmonary nodule. Received TNK at 1656. Paitient admitted to NSICU for post-tenecteplase monitoring. TTE unremarkable. Pending 24hr CTH to step down to stroke tele.    Neuro  #CVA workup  - start DAPT once 24hr CTH neg for bleed  - c/w atorvastatin 80 once daily  - MRI brain without contrast  - echo with bubble unremarkable, may eventually need SARI  - PT/OT order wtihin 24 hours   - Stroke education    Cards  #HTN  - goal sBP as above per post-tenecteplase protocol   - hold home blood pressure medication for now    #HLD  - high dose statin as above in CVA  - LDL results: 141    Pulm  - call provider if SPO2 < 94%    GI  #Nutrition/Fluids/Electrolytes   - replete K<4 and Mg <2  - Diet: CC diet  - IVF: avoid fluids containing dextrose     Renal  - monitor and trend Cr    Infectious Disease  - jack    Endocrine  #DM  - A1C results: 6.5  - SSI    - TSH results: 0.747    DVT Prophylaxis  - hold chemoprophylaxis s/p tenecteplase, can start after 24hr CTH is neg for bleed  - SCDs for DVT prophylaxis     Dispo: Pending PT/OT evaluation     Discussed daily hospital plans and goals with patient.      Discussed with Dr. Araujo and Dr. Finley     68y Female with PMHx of tobacco use disorder, HTN, COPD, DM, angina presenting to ED for SOB and L sided weakness/numbness. LKW 1pm on 3/18 when sx started. NIHSS 8. L NLFF, LUE 3/5, LLE 2/5, LUE/LLE numbness. CTH no acute intracranial hemorrhage, mass effect or large demarcated territorial infarction. Punctate linear density in the right cerebellar hemisphere likely representing calcification. Intracranial CTA: No large vessel occlusion. Supraclinoid left ICA aneurysm versus infundibulum. Extracranial CTA: Moderate stenosis the proximal bilateral internal carotid artery secondary to mixed calcified and noncalcified plaque. Moderate stenosis the proximal left subclavian artery. 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 5 mm right upper lobe pulmonary nodule. Received TNK at 1656. Patient admitted to NSICU for post-tenecteplase monitoring. TTE unremarkable. 24hr CTH neg for bleed. Okay to step down to stroke tele.    Neuro  #CVA workup  - start DAPT    - c/w atorvastatin 80 once daily  - MRI brain without contrast  - echo with bubble unremarkable, may eventually need SARI  - PT/OT order within 24 hours   - Stroke education    Cards  #HTN  - 100-180  - hold home blood pressure medication for now    #HLD  - high dose statin as above in CVA  - LDL results: 141    Pulm  - call provider if SPO2 < 94%    GI  #Nutrition/Fluids/Electrolytes   - replete K<4 and Mg <2  - Diet: CC diet  - IVF: avoid fluids containing dextrose     Renal  - monitor and trend Cr    Infectious Disease  - jack    Endocrine  #DM  - A1C results: 6.5  - SSI    - TSH results: 0.747    DVT Prophylaxis  - lovenox sq  - SCDs for DVT prophylaxis     Dispo: Pending PT/OT evaluation     Discussed daily hospital plans and goals with patient.      Discussed with Dr. Araujo and Dr. Finley     68y Female with PMHx of tobacco use disorder, HTN, COPD, DM, angina presenting to ED for SOB and L sided weakness/numbness. LKW 1pm on 3/18 when sx started. NIHSS 8. L NLFF, LUE 3/5, LLE 2/5, LUE/LLE numbness. CTH no acute intracranial hemorrhage, mass effect or large demarcated territorial infarction. Punctate linear density in the right cerebellar hemisphere likely representing calcification. Intracranial CTA: No large vessel occlusion. Supraclinoid left ICA aneurysm versus infundibulum. Extracranial CTA: Moderate stenosis the proximal bilateral internal carotid artery secondary to mixed calcified and noncalcified plaque. Moderate stenosis the proximal left subclavian artery. 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 5 mm right upper lobe pulmonary nodule. Received TNK at 1656. Patient admitted to NSICU for post-tenecteplase monitoring. TTE unremarkable. 24hr CTH neg for bleed. Okay to step down to stroke tele.    Neuro  #CVA workup  - start DAPT    - c/w atorvastatin 80 once daily  - MRI brain without contrast  - echo with bubble unremarkable, may eventually need SARI  - PT/OT order within 24 hours   - Stroke education    Cards  #HTN  - 100-180  - hold home blood pressure medication for now    #HLD  - high dose statin as above in CVA  - LDL results: 141    Pulm  - call provider if SPO2 < 94%    GI  #Nutrition/Fluids/Electrolytes   - replete K<4 and Mg <2  - Diet: CC diet, FEES vs MBSS 3/20 w/ S&S  - IVF: avoid fluids containing dextrose     Renal  - monitor and trend Cr    Infectious Disease  - jack    Endocrine  #DM  - A1C results: 6.5  - SSI    - TSH results: 0.747    DVT Prophylaxis  - lovenox sq  - SCDs for DVT prophylaxis     Dispo: Pending PT/OT evaluation     Discussed daily hospital plans and goals with patient.      Discussed with Dr. Araujo and Dr. Finley

## 2024-03-19 NOTE — PROGRESS NOTE ADULT - THIS PATIENT HAS THE FOLLOWING CONDITION(S)/DIAGNOSES ON THIS ADMISSION:
----- Message from Haroldo Mendez sent at 10/4/2019 12:06 PM CDT -----  Contact: Self  Type: RX Refill Request    Who Called: Self    Have you contacted your pharmacy:No    Refill or New Rx:Refill    RX Name and Strength:amLODIPine (NORVASC) 10 MG tablet    How is the patient currently taking it? 1XDay    Is this a 30 day or 90 day RX:90    Preferred Pharmacy with phone number:   [id number] - [place] - [facility] [facility] [employer] [facility] [address] [place] [address] [address] [place] [address]  Phone: [phone] Fax: [phone]      Local or Mail Order:Local    Ordering Provider:Dr. JESE Ambrosio    Would the patient rather a call back or a response via [facility]? Call    Best Call Back Number:[phone] or [phone]      
Please advise    Thanks,  Gina  
None
None

## 2024-03-19 NOTE — SWALLOW BEDSIDE ASSESSMENT ADULT - SLP GENERAL OBSERVATIONS
Patient was seen fully awake and alert, HOB fully elevated, on room air. A&Ox3, followed simple directives, and communicated wants/needs. No dysarthria. Fluent and intelligible speech. Moderate rough vocal quality.

## 2024-03-19 NOTE — OCCUPATIONAL THERAPY INITIAL EVALUATION ADULT - PERTINENT HX OF CURRENT PROBLEM, REHAB EVAL
68y Female with PMHx of tobacco use disorder, HTN, COPD, DM, angina presenting to ED for SOB and L sided weakness/numbness. LKW 1pm on 3/18 when sx started. NIHSS 8. L NLFF, LUE 3/5, LLE 2/5, LUE/LLE numbness. CTH no acute intracranial hemorrhage, mass effect or large demarcated territorial infarction. Punctate linear density in the right cerebellar hemisphere likely representing calcification. Intracranial CTA: No large vessel occlusion. Supraclinoid left ICA aneurysm versus infundibulum. Extracranial CTA: Moderate stenosis the proximal bilateral internal carotid artery secondary to mixed calcified and noncalcified plaque. Moderate stenosis the proximal left subclavian artery. 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 5 mm right upper lobe pulmonary nodule. Received TNK at 1656. Patient admitted to NSICU for post-tenecteplase monitoring. TTE unremarkable. 24hr CTH neg for bleed. Okay to step down to stroke tele.

## 2024-03-19 NOTE — OCCUPATIONAL THERAPY INITIAL EVALUATION ADULT - LEVEL OF INDEPENDENCE: SIT/STAND, REHAB EVAL
Deferred as pt hypertensive with . Discussed with JEANIE Harrison, and OOB activity to be deferred at this time.

## 2024-03-19 NOTE — OCCUPATIONAL THERAPY INITIAL EVALUATION ADULT - RANGE OF MOTION EXAMINATION, LOWER EXTREMITY
Left LE Passive ROM was WFL (w/i functional limits)/Right LE Active ROM was WNL(within normal limits)

## 2024-03-19 NOTE — PHYSICAL THERAPY INITIAL EVALUATION ADULT - MODALITIES TREATMENT COMMENTS
R hand dominant; (L) hand  3-/5, (R) hand  5/5. CN Testing: B/L Frontalis intact; B/L buccinator intact; smile symmetrical; tongue protrusion at midline; B/L eyes open/close intact; Shoulder elevation: intact bilaterally; Vision H-Test: bilateral tracking and smooth pursuit intact; Convergence/Divergence: intact; Vision Quadrant Test: intact bilaterally

## 2024-03-19 NOTE — OCCUPATIONAL THERAPY INITIAL EVALUATION ADULT - GENERAL OBSERVATIONS, REHAB EVAL
OT IE completed. MRS 5. Pt admitted to [facility] for stroke evaluation 2/2 L sided weakness. Orders received, chart reviewed, pt cleared for OT by DILEEP Gómez. Pt received semi supine in bed, NAD, +heplock, +tele. Pt A&Ox4, agreeable to OT, and tolerated session well.

## 2024-03-19 NOTE — PROGRESS NOTE ADULT - SUBJECTIVE AND OBJECTIVE BOX
Neurology Stroke Progress Note    INTERVAL HPI/OVERNIGHT EVENTS:  Patient seen and examined.     MEDICATIONS  (STANDING):  atorvastatin 80 milliGRAM(s) Oral at bedtime  lactated ringers. 1000 milliLiter(s) (60 mL/Hr) IV Continuous <Continuous>  nicotine -  14 mG/24Hr(s) Patch 1 Patch Transdermal daily    MEDICATIONS  (PRN):  artificial  tears Solution 1 Drop(s) Both EYES every 2 hours PRN Dry Eyes  hydrALAZINE Injectable 10 milliGRAM(s) IV Push every 4 hours PRN SBP>180  senna 2 Tablet(s) Oral at bedtime PRN Constipation      Allergies    No Known Allergies    Intolerances        Vital Signs Last 24 Hrs  T(C): 36.6 (19 Mar 2024 13:46), Max: 36.8 (18 Mar 2024 18:02)  T(F): 97.9 (19 Mar 2024 13:46), Max: 98.3 (18 Mar 2024 18:02)  HR: 68 (19 Mar 2024 14:00) (55 - 86)  BP: 146/65 (19 Mar 2024 14:00) (95/48 - 219/102)  BP(mean): 94 (19 Mar 2024 14:00) (68 - 115)  RR: 23 (19 Mar 2024 14:00) (11 - 32)  SpO2: 95% (19 Mar 2024 14:00) (91% - 99%)    Parameters below as of 19 Mar 2024 14:00  Patient On (Oxygen Delivery Method): room air      -Mental status: Awake, alert, oriented to person, place, and time. Speech is fluent with intact naming, repetition, and comprehension, hoarse voice but no dysarthria. Recent and remote memory intact. Follows commands. Attention/concentration intact.    -Cranial nerves:   II: Visual fields are full to confrontation.  III, IV, VI: Extraocular movements are intact without nystagmus. Pupils equally round and reactive to light. Bilateral cataract surgery.  V:  Facial sensation V1-V3 equal and intact   VII: L NLFF  VIII: Hearing is bilaterally intact to finger rub  IX, X: Uvula is midline and soft palate rises symmetrically  XI: Head turning and shoulder shrug are intact.  XII: Tongue protrudes midline  Motor: Normal bulk and tone. R side 5/5. LUE 4/5. LLE 3/5.  Sensation: dec sensation LUE/LLE. No neglect or extinction on double simultaneous testing.  Coordination: +dysmetria LUE   Gait: deferred    LABS:                        12.5   7.82  )-----------( 343      ( 19 Mar 2024 05:30 )             37.2     03-19    143  |  113<H>  |  15  ----------------------------<  105<H>  3.6   |  21<L>  |  0.78    Ca    9.4      19 Mar 2024 05:30  Phos  3.4     03-19  Mg     2.3     03-19    TPro  6.5  /  Alb  4.0  /  TBili  0.6  /  DBili  x   /  AST  17  /  ALT  13  /  AlkPhos  111  03-18    PT/INR - ( 18 Mar 2024 16:01 )   PT: 11.2 sec;   INR: 0.98          PTT - ( 18 Mar 2024 16:01 )  PTT:28.0 sec  Urinalysis Basic - ( 19 Mar 2024 05:30 )    Color: x / Appearance: x / SG: x / pH: x  Gluc: 105 mg/dL / Ketone: x  / Bili: x / Urobili: x   Blood: x / Protein: x / Nitrite: x   Leuk Esterase: x / RBC: x / WBC x   Sq Epi: x / Non Sq Epi: x / Bacteria: x        RADIOLOGY & ADDITIONAL TESTS:  reviewed   TRANSFER FROM NSICU TO STROKE TELE    Hospital course: 68y Female with PMHx of tobacco use disorder, HTN, COPD, DM, angina presenting to ED for SOB and L sided weakness/numbness. LKW 1pm on 3/18 when sx started. NIHSS 8. L NLFF, LUE 3/5, LLE 2/5, LUE/LLE numbness. CTH no acute intracranial hemorrhage, mass effect or large demarcated territorial infarction. Punctate linear density in the right cerebellar hemisphere likely representing calcification. Intracranial CTA: No large vessel occlusion. Supraclinoid left ICA aneurysm versus infundibulum. Extracranial CTA: Moderate stenosis the proximal bilateral internal carotid artery secondary to mixed calcified and noncalcified plaque. Moderate stenosis the proximal left subclavian artery. 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 5 mm right upper lobe pulmonary nodule. Received TNK at 1656. Paitient admitted to NSICU for post-tenecteplase monitoring. TTE unremarkable. Pending 24hr CTH to step down to stroke tele.    INTERVAL HPI/OVERNIGHT EVENTS:  Patient seen and examined.     MEDICATIONS  (STANDING):  atorvastatin 80 milliGRAM(s) Oral at bedtime  lactated ringers. 1000 milliLiter(s) (60 mL/Hr) IV Continuous <Continuous>  nicotine -  14 mG/24Hr(s) Patch 1 Patch Transdermal daily    MEDICATIONS  (PRN):  artificial  tears Solution 1 Drop(s) Both EYES every 2 hours PRN Dry Eyes  hydrALAZINE Injectable 10 milliGRAM(s) IV Push every 4 hours PRN SBP>180  senna 2 Tablet(s) Oral at bedtime PRN Constipation      Allergies    No Known Allergies    Intolerances        Vital Signs Last 24 Hrs  T(C): 36.6 (19 Mar 2024 13:46), Max: 36.8 (18 Mar 2024 18:02)  T(F): 97.9 (19 Mar 2024 13:46), Max: 98.3 (18 Mar 2024 18:02)  HR: 68 (19 Mar 2024 14:00) (55 - 86)  BP: 146/65 (19 Mar 2024 14:00) (95/48 - 219/102)  BP(mean): 94 (19 Mar 2024 14:00) (68 - 115)  RR: 23 (19 Mar 2024 14:00) (11 - 32)  SpO2: 95% (19 Mar 2024 14:00) (91% - 99%)    Parameters below as of 19 Mar 2024 14:00  Patient On (Oxygen Delivery Method): room air      -Mental status: Awake, alert, oriented to person, place, and time. Speech is fluent with intact naming, repetition, and comprehension, hoarse voice but no dysarthria. Recent and remote memory intact. Follows commands. Attention/concentration intact.    -Cranial nerves:   II: Visual fields are full to confrontation.  III, IV, VI: Extraocular movements are intact without nystagmus. Pupils equally round and reactive to light. Bilateral cataract surgery.  V:  Facial sensation V1-V3 equal and intact   VII: L NLFF  VIII: Hearing is bilaterally intact to finger rub  IX, X: Uvula is midline and soft palate rises symmetrically  XI: Head turning and shoulder shrug are intact.  XII: Tongue protrudes midline  Motor: Normal bulk and tone. R side 5/5. LUE 4/5. LLE 3/5.  Sensation: dec sensation LUE/LLE. No neglect or extinction on double simultaneous testing.  Coordination: +dysmetria LUE   Gait: deferred    LABS:                        12.5   7.82  )-----------( 343      ( 19 Mar 2024 05:30 )             37.2     03-19    143  |  113<H>  |  15  ----------------------------<  105<H>  3.6   |  21<L>  |  0.78    Ca    9.4      19 Mar 2024 05:30  Phos  3.4     03-19  Mg     2.3     03-19    TPro  6.5  /  Alb  4.0  /  TBili  0.6  /  DBili  x   /  AST  17  /  ALT  13  /  AlkPhos  111  03-18    PT/INR - ( 18 Mar 2024 16:01 )   PT: 11.2 sec;   INR: 0.98          PTT - ( 18 Mar 2024 16:01 )  PTT:28.0 sec  Urinalysis Basic - ( 19 Mar 2024 05:30 )    Color: x / Appearance: x / SG: x / pH: x  Gluc: 105 mg/dL / Ketone: x  / Bili: x / Urobili: x   Blood: x / Protein: x / Nitrite: x   Leuk Esterase: x / RBC: x / WBC x   Sq Epi: x / Non Sq Epi: x / Bacteria: x        RADIOLOGY & ADDITIONAL TESTS:  reviewed   TRANSFER FROM NSICU TO STROKE TELE    Hospital course: 68y Female with PMHx of tobacco use disorder, HTN, COPD, DM, angina presenting to ED for SOB and L sided weakness/numbness. LKW 1pm on 3/18 when sx started. NIHSS 8. L NLFF, LUE 3/5, LLE 2/5, LUE/LLE numbness. CTH no acute intracranial hemorrhage, mass effect or large demarcated territorial infarction. Punctate linear density in the right cerebellar hemisphere likely representing calcification. Intracranial CTA: No large vessel occlusion. Supraclinoid left ICA aneurysm versus infundibulum. Extracranial CTA: Moderate stenosis the proximal bilateral internal carotid artery secondary to mixed calcified and noncalcified plaque. Moderate stenosis the proximal left subclavian artery. 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 5 mm right upper lobe pulmonary nodule. Received TNK at 1656. Paitient admitted to NSICU for post-tenecteplase monitoring. TTE unremarkable. Pending 24hr CTH to step down to stroke tele.    INTERVAL HPI/OVERNIGHT EVENTS:  Patient seen and examined.     MEDICATIONS  (STANDING):  atorvastatin 80 milliGRAM(s) Oral at bedtime  lactated ringers. 1000 milliLiter(s) (60 mL/Hr) IV Continuous <Continuous>  nicotine -  14 mG/24Hr(s) Patch 1 Patch Transdermal daily    MEDICATIONS  (PRN):  artificial  tears Solution 1 Drop(s) Both EYES every 2 hours PRN Dry Eyes  hydrALAZINE Injectable 10 milliGRAM(s) IV Push every 4 hours PRN SBP>180  senna 2 Tablet(s) Oral at bedtime PRN Constipation      Allergies    No Known Allergies    Intolerances        Vital Signs Last 24 Hrs  T(C): 36.6 (19 Mar 2024 13:46), Max: 36.8 (18 Mar 2024 18:02)  T(F): 97.9 (19 Mar 2024 13:46), Max: 98.3 (18 Mar 2024 18:02)  HR: 68 (19 Mar 2024 14:00) (55 - 86)  BP: 146/65 (19 Mar 2024 14:00) (95/48 - 219/102)  BP(mean): 94 (19 Mar 2024 14:00) (68 - 115)  RR: 23 (19 Mar 2024 14:00) (11 - 32)  SpO2: 95% (19 Mar 2024 14:00) (91% - 99%)    Parameters below as of 19 Mar 2024 14:00  Patient On (Oxygen Delivery Method): room air      -Mental status: Awake, alert, oriented to person, place, and time. Speech is fluent with intact naming, repetition, and comprehension, hoarse voice but no dysarthria. Recent and remote memory intact. Follows commands. Attention/concentration intact.    -Cranial nerves:   II: Visual fields are full to confrontation.  III, IV, VI: Extraocular movements are intact without nystagmus. Pupils equally round and reactive to light. Bilateral cataract surgery.  V:  Facial sensation V1-V3 equal and intact   VII: L NLFF  VIII: Hearing is bilaterally intact to finger rub  IX, X: Uvula is midline and soft palate rises symmetrically  XI: Head turning and shoulder shrug are intact.  XII: Tongue protrudes midline  Motor: Normal bulk and tone. R side 5/5. LUE 4/5. LLE 3/5.  Sensation: dec sensation LUE/LLE. No neglect or extinction on double simultaneous testing.  Coordination: +dysmetria LUE 2/2 weakness  Gait: deferred    LABS:                        12.5   7.82  )-----------( 343      ( 19 Mar 2024 05:30 )             37.2     03-19    143  |  113<H>  |  15  ----------------------------<  105<H>  3.6   |  21<L>  |  0.78    Ca    9.4      19 Mar 2024 05:30  Phos  3.4     03-19  Mg     2.3     03-19    TPro  6.5  /  Alb  4.0  /  TBili  0.6  /  DBili  x   /  AST  17  /  ALT  13  /  AlkPhos  111  03-18    PT/INR - ( 18 Mar 2024 16:01 )   PT: 11.2 sec;   INR: 0.98          PTT - ( 18 Mar 2024 16:01 )  PTT:28.0 sec  Urinalysis Basic - ( 19 Mar 2024 05:30 )    Color: x / Appearance: x / SG: x / pH: x  Gluc: 105 mg/dL / Ketone: x  / Bili: x / Urobili: x   Blood: x / Protein: x / Nitrite: x   Leuk Esterase: x / RBC: x / WBC x   Sq Epi: x / Non Sq Epi: x / Bacteria: x        RADIOLOGY & ADDITIONAL TESTS:  reviewed   TRANSFER FROM NSICU TO STROKE TELE    Hospital course: 68y Female with PMHx of tobacco use disorder, HTN, COPD, DM, angina presenting to ED for SOB and L sided weakness/numbness. LKW 1pm on 3/18 when sx started. NIHSS 8. L NLFF, LUE 3/5, LLE 2/5, LUE/LLE numbness. CTH no acute intracranial hemorrhage, mass effect or large demarcated territorial infarction. Punctate linear density in the right cerebellar hemisphere likely representing calcification. Intracranial CTA: No large vessel occlusion. Supraclinoid left ICA aneurysm versus infundibulum. Extracranial CTA: Moderate stenosis the proximal bilateral internal carotid artery secondary to mixed calcified and noncalcified plaque. Moderate stenosis the proximal left subclavian artery. 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 5 mm right upper lobe pulmonary nodule. Received TNK at 1656. Patient admitted to NSICU for post-tenecteplase monitoring. TTE unremarkable. 24hr CTH neg for bleed. Okay to step down to stroke tele.    INTERVAL HPI/OVERNIGHT EVENTS:  Patient seen and examined.     MEDICATIONS  (STANDING):  atorvastatin 80 milliGRAM(s) Oral at bedtime  lactated ringers. 1000 milliLiter(s) (60 mL/Hr) IV Continuous <Continuous>  nicotine -  14 mG/24Hr(s) Patch 1 Patch Transdermal daily    MEDICATIONS  (PRN):  artificial  tears Solution 1 Drop(s) Both EYES every 2 hours PRN Dry Eyes  hydrALAZINE Injectable 10 milliGRAM(s) IV Push every 4 hours PRN SBP>180  senna 2 Tablet(s) Oral at bedtime PRN Constipation      Allergies    No Known Allergies    Intolerances        Vital Signs Last 24 Hrs  T(C): 36.6 (19 Mar 2024 13:46), Max: 36.8 (18 Mar 2024 18:02)  T(F): 97.9 (19 Mar 2024 13:46), Max: 98.3 (18 Mar 2024 18:02)  HR: 68 (19 Mar 2024 14:00) (55 - 86)  BP: 146/65 (19 Mar 2024 14:00) (95/48 - 219/102)  BP(mean): 94 (19 Mar 2024 14:00) (68 - 115)  RR: 23 (19 Mar 2024 14:00) (11 - 32)  SpO2: 95% (19 Mar 2024 14:00) (91% - 99%)    Parameters below as of 19 Mar 2024 14:00  Patient On (Oxygen Delivery Method): room air      -Mental status: Awake, alert, oriented to person, place, and time. Speech is fluent with intact naming, repetition, and comprehension, hoarse voice but no dysarthria. Recent and remote memory intact. Follows commands. Attention/concentration intact.    -Cranial nerves:   II: Visual fields are full to confrontation.  III, IV, VI: Extraocular movements are intact without nystagmus. Pupils equally round and reactive to light. Bilateral cataract surgery.  V:  Facial sensation V1-V3 equal and intact   VII: L NLFF  VIII: Hearing is bilaterally intact to finger rub  IX, X: Uvula is midline and soft palate rises symmetrically  XI: Head turning and shoulder shrug are intact.  XII: Tongue protrudes midline  Motor: Normal bulk and tone. R side 5/5. LUE 4/5. LLE 3/5.  Sensation: dec sensation LUE/LLE. No neglect or extinction on double simultaneous testing.  Coordination: +dysmetria LUE 2/2 weakness  Gait: deferred    LABS:                        12.5   7.82  )-----------( 343      ( 19 Mar 2024 05:30 )             37.2     03-19    143  |  113<H>  |  15  ----------------------------<  105<H>  3.6   |  21<L>  |  0.78    Ca    9.4      19 Mar 2024 05:30  Phos  3.4     03-19  Mg     2.3     03-19    TPro  6.5  /  Alb  4.0  /  TBili  0.6  /  DBili  x   /  AST  17  /  ALT  13  /  AlkPhos  111  03-18    PT/INR - ( 18 Mar 2024 16:01 )   PT: 11.2 sec;   INR: 0.98          PTT - ( 18 Mar 2024 16:01 )  PTT:28.0 sec  Urinalysis Basic - ( 19 Mar 2024 05:30 )    Color: x / Appearance: x / SG: x / pH: x  Gluc: 105 mg/dL / Ketone: x  / Bili: x / Urobili: x   Blood: x / Protein: x / Nitrite: x   Leuk Esterase: x / RBC: x / WBC x   Sq Epi: x / Non Sq Epi: x / Bacteria: x        RADIOLOGY & ADDITIONAL TESTS:  reviewed

## 2024-03-19 NOTE — PROGRESS NOTE ADULT - ASSESSMENT
68y/F with  acute cerebrovascular accident; prox bilateral ICA moderate stenosis, prox L SC artery moderate stenosis; diffuse narrowing L VA  supraclinoid L ICA aneurysm vs infundibulum  Hypertension Diabetes Mellitus  COPD, smoker; R UL pulmonary nodule  h/o angina  back pain    PLAN:   NEURO:  REHAB:  physical therapy evaluation and management    EARLY MOB:      PULM:  Room air, incentive spirometry  CARDIO:  SBP goal 100-150mm Hg  ENDO:  Blood sugar goals 140-180 mg/dL, continue insulin sliding scale  GI:  PPI for GI prophylaxis  DIET:  RENAL:    HEM/ONC:  VTE Prophylaxis:     ID: afebrile, no leukocytosis  ====================   T(F): , Max: 98.3 (03-18-24 @ 18:02)    WBC Count: 7.82 (03-19)  WBC Count: 11.19 (03-18)    ====================  Social: will update family    Active issues:  What's keeping patient in the ICU? close neurochecks  What is this patient's dispo plan? stepdown once 24h post TNK CT stable    ATTENDING ATTESTATION:  I was physically present for the key portions of the evaluation and management (E/M) service provided.  I agree with the above history, physical and plan, which I have reviewed and edited where appropriate.    Patient at high risk for neurological deterioration or death due to:  ICU delirium, aspiration PNA, DVT / PE.  Critical care time:  I have personally provided 60 minutes of critical care time, excluding time spent on separate procedures.      Plan discussed with RN, house staff. 68y/F with  acute cerebrovascular accident; prox bilateral ICA moderate stenosis, prox L SC artery moderate stenosis; diffuse narrowing L VA  supraclinoid L ICA aneurysm vs infundibulum  Hypertension Diabetes Mellitus  COPD, smoker; R UL pulmonary nodule  h/o angina  back pain    PLAN:   NEURO: neurochecks q1h, PRN pain meds with acetaminophen  post-TNK CT scan at 5 p.m., stroke core measures  MRI on stepdown, ASA after CT shows no heme  REHAB:  physical therapy evaluation and management    EARLY MOB:  OOB to chair    PULM:  Room air, incentive spirometry  CARDIO:  SBP goal <180/105mm Hg; PRN antihypertensives; will resume home antihypertensives if requiring multiple PRNs  ENDO:  Blood sugar goals 140-180 mg/dL, continue insulin sliding scale; start atorvastatin 80mg /day   GI:  bowel regimen  DIET: add ensure, switch to soft and bite-sized  RENAL:  switch to LR @50cc/hr, d/c once eating well  HEM/ONC: Hb stable  VTE Prophylaxis: SCDs, no DVT chemoprophylaxis for now as patient is high risk for bleed (s/p TNK); start SQL if CT shows no heme  ID: afebrile, no leukocytosis  Social: will update family    Active issues:  What's keeping patient in the ICU? close neurochecks  What is this patient's dispo plan? stepdown once 24h post TNK CT stable    ATTENDING ATTESTATION:  I was physically present for the key portions of the evaluation and management (E/M) service provided.  I agree with the above history, physical and plan, which I have reviewed and edited where appropriate.    Patient at high risk for neurological deterioration or death due to:  ICU delirium, aspiration PNA, DVT / PE.  Critical care time:  I have personally provided 60 minutes of critical care time, excluding time spent on separate procedures.      Plan discussed with RN, house staff.

## 2024-03-19 NOTE — SWALLOW BEDSIDE ASSESSMENT ADULT - SLP PERTINENT HISTORY OF CURRENT PROBLEM
PMHx of tobacco use disorder, HTN, COPD, DM, angina who presented to [facility] on 3/18/24 for SOB and L sided weakness/numbness. CTH no acute intracranial hemorrhage, mass effect or large demarcated territorial infarction. Punctate linear density in the right cerebellar hemisphere likely representing calcification. Intracranial CTA: No large vessel occlusion. Supraclinoid left ICA aneurysm versus infundibulum. Extracranial CTA: Moderate stenosis the proximal bilateral internal carotid artery secondary to mixed calcified and noncalcified plaque. Moderate stenosis the proximal left subclavian artery. 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 5 mm right upper lobe pulmonary nodule.

## 2024-03-20 DIAGNOSIS — J44.9 CHRONIC OBSTRUCTIVE PULMONARY DISEASE, UNSPECIFIED: ICD-10-CM

## 2024-03-20 DIAGNOSIS — R53.1 WEAKNESS: ICD-10-CM

## 2024-03-20 DIAGNOSIS — F17.200 NICOTINE DEPENDENCE, UNSPECIFIED, UNCOMPLICATED: ICD-10-CM

## 2024-03-20 DIAGNOSIS — E11.9 TYPE 2 DIABETES MELLITUS WITHOUT COMPLICATIONS: ICD-10-CM

## 2024-03-20 DIAGNOSIS — E78.5 HYPERLIPIDEMIA, UNSPECIFIED: ICD-10-CM

## 2024-03-20 LAB
ANION GAP SERPL CALC-SCNC: 11 MMOL/L — SIGNIFICANT CHANGE UP (ref 5–17)
BUN SERPL-MCNC: 9 MG/DL — SIGNIFICANT CHANGE UP (ref 7–23)
CALCIUM SERPL-MCNC: 9.5 MG/DL — SIGNIFICANT CHANGE UP (ref 8.4–10.5)
CHLORIDE SERPL-SCNC: 107 MMOL/L — SIGNIFICANT CHANGE UP (ref 96–108)
CO2 SERPL-SCNC: 22 MMOL/L — SIGNIFICANT CHANGE UP (ref 22–31)
CREAT SERPL-MCNC: 0.72 MG/DL — SIGNIFICANT CHANGE UP (ref 0.5–1.3)
EGFR: 92 ML/MIN/1.73M2 — SIGNIFICANT CHANGE UP
GLUCOSE SERPL-MCNC: 107 MG/DL — HIGH (ref 70–99)
HCT VFR BLD CALC: 38.4 % — SIGNIFICANT CHANGE UP (ref 34.5–45)
HGB BLD-MCNC: 13.1 G/DL — SIGNIFICANT CHANGE UP (ref 11.5–15.5)
MAGNESIUM SERPL-MCNC: 2.2 MG/DL — SIGNIFICANT CHANGE UP (ref 1.6–2.6)
MCHC RBC-ENTMCNC: 31.8 PG — SIGNIFICANT CHANGE UP (ref 27–34)
MCHC RBC-ENTMCNC: 34.1 GM/DL — SIGNIFICANT CHANGE UP (ref 32–36)
MCV RBC AUTO: 93.2 FL — SIGNIFICANT CHANGE UP (ref 80–100)
NRBC # BLD: 0 /100 WBCS — SIGNIFICANT CHANGE UP (ref 0–0)
PHOSPHATE SERPL-MCNC: 3.4 MG/DL — SIGNIFICANT CHANGE UP (ref 2.5–4.5)
PLATELET # BLD AUTO: 368 K/UL — SIGNIFICANT CHANGE UP (ref 150–400)
POTASSIUM SERPL-MCNC: 3.6 MMOL/L — SIGNIFICANT CHANGE UP (ref 3.5–5.3)
POTASSIUM SERPL-SCNC: 3.6 MMOL/L — SIGNIFICANT CHANGE UP (ref 3.5–5.3)
RBC # BLD: 4.12 M/UL — SIGNIFICANT CHANGE UP (ref 3.8–5.2)
RBC # FLD: 12.3 % — SIGNIFICANT CHANGE UP (ref 10.3–14.5)
SODIUM SERPL-SCNC: 140 MMOL/L — SIGNIFICANT CHANGE UP (ref 135–145)
WBC # BLD: 11.73 K/UL — HIGH (ref 3.8–10.5)
WBC # FLD AUTO: 11.73 K/UL — HIGH (ref 3.8–10.5)

## 2024-03-20 PROCEDURE — 99222 1ST HOSP IP/OBS MODERATE 55: CPT

## 2024-03-20 PROCEDURE — 70551 MRI BRAIN STEM W/O DYE: CPT | Mod: 26

## 2024-03-20 RX ORDER — IBUPROFEN 200 MG
200 TABLET ORAL ONCE
Refills: 0 | Status: COMPLETED | OUTPATIENT
Start: 2024-03-20 | End: 2024-03-20

## 2024-03-20 RX ORDER — GABAPENTIN 400 MG/1
100 CAPSULE ORAL ONCE
Refills: 0 | Status: COMPLETED | OUTPATIENT
Start: 2024-03-20 | End: 2024-03-20

## 2024-03-20 RX ORDER — GABAPENTIN 400 MG/1
100 CAPSULE ORAL ONCE
Refills: 0 | Status: DISCONTINUED | OUTPATIENT
Start: 2024-03-20 | End: 2024-03-20

## 2024-03-20 RX ORDER — IBUPROFEN 200 MG
400 TABLET ORAL ONCE
Refills: 0 | Status: COMPLETED | OUTPATIENT
Start: 2024-03-20 | End: 2024-03-20

## 2024-03-20 RX ORDER — LANOLIN ALCOHOL/MO/W.PET/CERES
5 CREAM (GRAM) TOPICAL AT BEDTIME
Refills: 0 | Status: DISCONTINUED | OUTPATIENT
Start: 2024-03-20 | End: 2024-03-22

## 2024-03-20 RX ADMIN — ENOXAPARIN SODIUM 40 MILLIGRAM(S): 100 INJECTION SUBCUTANEOUS at 18:51

## 2024-03-20 RX ADMIN — Medication 2 MILLIGRAM(S): at 21:01

## 2024-03-20 RX ADMIN — Medication 5 MILLIGRAM(S): at 02:34

## 2024-03-20 RX ADMIN — Medication 10 MILLIGRAM(S): at 17:52

## 2024-03-20 RX ADMIN — Medication 1 DROP(S): at 00:43

## 2024-03-20 RX ADMIN — Medication 81 MILLIGRAM(S): at 11:38

## 2024-03-20 RX ADMIN — Medication 1 PATCH: at 06:37

## 2024-03-20 RX ADMIN — Medication 400 MILLIGRAM(S): at 04:00

## 2024-03-20 RX ADMIN — Medication 1 PATCH: at 11:38

## 2024-03-20 RX ADMIN — LIDOCAINE 1 PATCH: 4 CREAM TOPICAL at 11:28

## 2024-03-20 RX ADMIN — TRAMADOL HYDROCHLORIDE 25 MILLIGRAM(S): 50 TABLET ORAL at 01:32

## 2024-03-20 RX ADMIN — Medication 400 MILLIGRAM(S): at 02:34

## 2024-03-20 RX ADMIN — Medication 200 MILLIGRAM(S): at 07:40

## 2024-03-20 RX ADMIN — LIDOCAINE 1 PATCH: 4 CREAM TOPICAL at 06:36

## 2024-03-20 RX ADMIN — Medication 5 MILLIGRAM(S): at 21:01

## 2024-03-20 RX ADMIN — CLOPIDOGREL BISULFATE 75 MILLIGRAM(S): 75 TABLET, FILM COATED ORAL at 11:38

## 2024-03-20 RX ADMIN — Medication 1 MILLIGRAM(S): at 16:48

## 2024-03-20 RX ADMIN — Medication 1 PATCH: at 11:28

## 2024-03-20 RX ADMIN — Medication 200 MILLIGRAM(S): at 07:23

## 2024-03-20 RX ADMIN — Medication 1 PATCH: at 18:11

## 2024-03-20 NOTE — CONSULT NOTE ADULT - SUBJECTIVE AND OBJECTIVE BOX
Patient is a 68y old  Female who presents with a chief complaint of Acute CVA, s/p TNK (18 Mar 2024 19:02)    HPI:   **STROKE HPI***    HPI: 68y Female with PMHx of tobacco use disorder, HTN, COPD, DM, angina presenting to ED for SOB and L sided weakness/numbness. LKW 1pm when sx started. NIHSS 8. L NLFF, LUE 3/5, LLE 2/5, LUE/LLE numbness. CTH no acute intracranial hemorrhage, mass effect or large demarcated territorial infarction. Punctate linear density in the right cerebellar hemisphere likely representing calcification. Intracranial CTA: No large vessel occlusion. Supraclinoid left ICA aneurysm versus infundibulum. Extracranial CTA: Moderate stenosis the proximal bilateral internal carotid artery secondary to mixed calcified and noncalcified plaque. Moderate stenosis the proximal left subclavian artery. 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 5 mm right upper lobe pulmonary nodule. Case discussed with Dr. Araujo prior to urgent intervention. Risk and benefits of tenecteplase were discussed with patient including risk of symptomatic ICH/death. Decision was made that benefits outweighed risks and tenecteplase was administered at 1656.    (18 Mar 2024 17:09)    Review of Systems: 12 point review of systems otherwise negative    PAST MEDICAL & SURGICAL HISTORY:  History of angina      Back pain        MEDICATIONS  (STANDING):  aspirin enteric coated 81 milliGRAM(s) Oral daily  atorvastatin 80 milliGRAM(s) Oral at bedtime  clopidogrel Tablet 75 milliGRAM(s) Oral daily  enoxaparin Injectable 40 milliGRAM(s) SubCutaneous every 24 hours  influenza  Vaccine (HIGH DOSE) 0.7 milliLiter(s) IntraMuscular once  lactated ringers. 1000 milliLiter(s) (30 mL/Hr) IV Continuous <Continuous>  lidocaine   4% Patch 1 Patch Transdermal every 24 hours  melatonin 5 milliGRAM(s) Oral at bedtime  nicotine -  14 mG/24Hr(s) Patch 1 Patch Transdermal daily    MEDICATIONS  (PRN):  artificial  tears Solution 1 Drop(s) Both EYES every 2 hours PRN Dry Eyes  hydrALAZINE Injectable 10 milliGRAM(s) IV Push every 4 hours PRN SBP>180  senna 2 Tablet(s) Oral at bedtime PRN Constipation      Allergies    No Known Allergies    Intolerances          Vital Signs Last 24 Hrs  T(C): 36.9 (20 Mar 2024 17:36), Max: 36.9 (19 Mar 2024 21:51)  T(F): 98.5 (20 Mar 2024 17:36), Max: 98.5 (20 Mar 2024 01:00)  HR: 70 (20 Mar 2024 13:00) (62 - 86)  BP: 167/94 (20 Mar 2024 12:20) (142/62 - 187/82)  BP(mean): 119 (20 Mar 2024 12:20) (88 - 121)  RR: 18 (20 Mar 2024 13:00) (16 - 20)  SpO2: 96% (20 Mar 2024 13:00) (95% - 98%)    Parameters below as of 20 Mar 2024 13:00  Patient On (Oxygen Delivery Method): room air      CAPILLARY BLOOD GLUCOSE          03-19 @ 07:01  -  03-20 @ 07:00  --------------------------------------------------------  IN: 640 mL / OUT: 775 mL / NET: -135 mL        Physical Exam:  (earlier today)  Daily     Daily   General:  comfortable-appearing in NAD  HEENT:  MMM  CV:  RRR  Lungs:  CTA B/L  Abdomen:  soft NT ND  Extremities:  no edema B/L LE  Skin:  WWP  Neuro:  AAOx3, no dysarthria, strength 4+/5 LUE and LLE    LABS:                        13.1   11.73 )-----------( 368      ( 20 Mar 2024 05:30 )             38.4     03-20    140  |  107  |  9   ----------------------------<  107<H>  3.6   |  22  |  0.72    Ca    9.5      20 Mar 2024 05:30  Phos  3.4     03-20  Mg     2.2     03-20        Urinalysis Basic - ( 20 Mar 2024 05:30 )    Color: x / Appearance: x / SG: x / pH: x  Gluc: 107 mg/dL / Ketone: x  / Bili: x / Urobili: x   Blood: x / Protein: x / Nitrite: x   Leuk Esterase: x / RBC: x / WBC x   Sq Epi: x / Non Sq Epi: x / Bacteria: x

## 2024-03-20 NOTE — PROGRESS NOTE ADULT - SUBJECTIVE AND OBJECTIVE BOX
Neurology Stroke Progress Note    INTERVAL HPI/OVERNIGHT EVENTS:  Patient seen and examined.     MEDICATIONS  (STANDING):  aspirin enteric coated 81 milliGRAM(s) Oral daily  atorvastatin 80 milliGRAM(s) Oral at bedtime  clopidogrel Tablet 75 milliGRAM(s) Oral daily  enoxaparin Injectable 40 milliGRAM(s) SubCutaneous every 24 hours  influenza  Vaccine (HIGH DOSE) 0.7 milliLiter(s) IntraMuscular once  lactated ringers. 1000 milliLiter(s) (30 mL/Hr) IV Continuous <Continuous>  lidocaine   4% Patch 1 Patch Transdermal every 24 hours  melatonin 5 milliGRAM(s) Oral at bedtime  nicotine -  14 mG/24Hr(s) Patch 1 Patch Transdermal daily    MEDICATIONS  (PRN):  artificial  tears Solution 1 Drop(s) Both EYES every 2 hours PRN Dry Eyes  hydrALAZINE Injectable 10 milliGRAM(s) IV Push every 4 hours PRN SBP>180  senna 2 Tablet(s) Oral at bedtime PRN Constipation      Allergies    No Known Allergies    Intolerances        Vital Signs Last 24 Hrs  T(C): 36.9 (20 Mar 2024 17:36), Max: 36.9 (19 Mar 2024 21:51)  T(F): 98.5 (20 Mar 2024 17:36), Max: 98.5 (20 Mar 2024 01:00)  HR: 70 (20 Mar 2024 13:00) (62 - 86)  BP: 167/94 (20 Mar 2024 12:20) (142/62 - 187/82)  BP(mean): 119 (20 Mar 2024 12:20) (88 - 121)  RR: 18 (20 Mar 2024 13:00) (16 - 20)  SpO2: 96% (20 Mar 2024 13:00) (95% - 98%)    Parameters below as of 20 Mar 2024 13:00  Patient On (Oxygen Delivery Method): room air    -Mental status: Awake, alert, oriented to person, place, and time. Speech is fluent with intact naming, repetition, and comprehension, hoarse voice but no dysarthria. Recent and remote memory intact. Follows commands. Attention/concentration intact.    -Cranial nerves:   II: Visual fields are full to confrontation.  III, IV, VI: Extraocular movements are intact without nystagmus. Pupils equally round and reactive to light. Bilateral cataract surgery.  V:  Facial sensation V1-V3 equal and intact   VII: L NLFF  VIII: Hearing is bilaterally intact to finger rub  IX, X: Uvula is midline and soft palate rises symmetrically  XI: Head turning and shoulder shrug are intact.  XII: Tongue protrudes midline  Motor: Normal bulk and tone. R side 5/5. LUE 4/5. LLE 3/5.  Sensation: dec sensation LUE/LLE. No neglect or extinction on double simultaneous testing.  Coordination: +dysmetria LUE 2/2 weakness  Gait: deferred    LABS:                        13.1   11.73 )-----------( 368      ( 20 Mar 2024 05:30 )             38.4     03-20    140  |  107  |  9   ----------------------------<  107<H>  3.6   |  22  |  0.72    Ca    9.5      20 Mar 2024 05:30  Phos  3.4     03-20  Mg     2.2     03-20        Urinalysis Basic - ( 20 Mar 2024 05:30 )    Color: x / Appearance: x / SG: x / pH: x  Gluc: 107 mg/dL / Ketone: x  / Bili: x / Urobili: x   Blood: x / Protein: x / Nitrite: x   Leuk Esterase: x / RBC: x / WBC x   Sq Epi: x / Non Sq Epi: x / Bacteria: x        RADIOLOGY & ADDITIONAL TESTS:    < from: MR Head No Cont (03.20.24 @ 17:16) >  IMPRESSION:  1. No evidence of acute infarct.  2. 8 mm nonspecific focus of FLAIR prolongation right cerebellar   hemisphere, situated in the region of calcification on CT, of uncertain   etiologyand significance. Recommend complete MRI of the brain, WITHOUT   and WITH intravenous contrast for more complete characterization,   provided there are no medical contraindications.    < end of copied text >    < from: Echocardiogram w/ Bubble and Doppler (03.19.24 @ 09:28) >  CONCLUSIONS:     1. Normal left and right ventricular size and systolic function.   2. Injection of agitated saline via a peripheral vein reveals no   evidence of a right-to-left shunt.   3. No significant valvular disease.   4. No evidence of pulmonary hypertension, pulmonary artery systolic   pressure is 22mmHg.   5. No pericardial effusion.   6. No prior echo is available for comparison.    < end of copied text >

## 2024-03-20 NOTE — CONSULT NOTE ADULT - PROBLEM SELECTOR RECOMMENDATION 9
Pt. c/o L-sided numbness/weakness and dysphagia, c/f stroke; Pt. given TNK; sx subsequently improving   -- cont. work-up and mgmt per Neuro  -- PT/OT, swallow therapy  -- plan for MRI brain  -- on DAPT + high-intensity statin

## 2024-03-20 NOTE — PROGRESS NOTE ADULT - ASSESSMENT
68y Female with PMHx of tobacco use disorder, HTN, COPD, DM, angina presenting to ED for SOB and L sided weakness/numbness. LKW 1pm on 3/18 when sx started. NIHSS 8. L NLFF, LUE 3/5, LLE 2/5, LUE/LLE numbness. CTH no acute intracranial hemorrhage, mass effect or large demarcated territorial infarction. Punctate linear density in the right cerebellar hemisphere likely representing calcification. Intracranial CTA: No large vessel occlusion. Supraclinoid left ICA aneurysm versus infundibulum. Extracranial CTA: Moderate stenosis the proximal bilateral internal carotid artery secondary to mixed calcified and noncalcified plaque. Moderate stenosis the proximal left subclavian artery. 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 5 mm right upper lobe pulmonary nodule. Received TNK at 1656. Patient admitted to NSICU for post-tenecteplase monitoring. TTE unremarkable. 24hr CTH neg for bleed. Refusing SARI. MRI brain without acute infarction. 8 mm nonspecific focus of FLAIR prolongation right cerebellar hemisphere, situated in the region of calcification on CT, of uncertain etiologyand significance. Pending psych consult for depressive thoughts.     Neuro  #CVA workup  - DAPT  - c/w atorvastatin 80 once daily  - MRI brain without acute infarction. 8 mm nonspecific focus of FLAIR prolongation right cerebellar hemisphere, situated in the region of calcification on CT, of uncertain etiologyand significance  - echo with bubble unremarkable, may eventually need SARI  - PT/OT  - Stroke education    Cards  #HTN  - 100-180  - hold home blood pressure medication for now  - TTE unremarkable  - refusing SARI    #HLD  - high dose statin as above in CVA  - LDL results: 141    Pulm  - call provider if SPO2 < 94%    GI  #Nutrition/Fluids/Electrolytes   - replete K<4 and Mg <2  - Diet: CC diet, refused MBS, patient reports better swallowing today  - IVF: avoid fluids containing dextrose     Renal  - monitor and trend Cr    Infectious Disease  - jack    Endocrine  #DM  - A1C results: 6.5  - SSI    - TSH results: 0.747    Psych  - obtain psych c/s in AM for depressive thoughts  - denies SI/HI/AVH    DVT Prophylaxis  - lovenox sq  - SCDs for DVT prophylaxis     Dispo: Pending PT/OT evaluation     Discussed daily hospital plans and goals with patient.      Discussed with Dr. Araujo and Dr. Finley   68y Female with PMHx of tobacco use disorder, HTN, COPD, DM, angina presenting to ED for SOB and L sided weakness/numbness. LKW 1pm on 3/18 when sx started. NIHSS 8. L NLFF, LUE 3/5, LLE 2/5, LUE/LLE numbness. CTH no acute intracranial hemorrhage, mass effect or large demarcated territorial infarction. Punctate linear density in the right cerebellar hemisphere likely representing calcification. Intracranial CTA: No large vessel occlusion. Supraclinoid left ICA aneurysm versus infundibulum. Extracranial CTA: Moderate stenosis the proximal bilateral internal carotid artery secondary to mixed calcified and noncalcified plaque. Moderate stenosis the proximal left subclavian artery. 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 5 mm right upper lobe pulmonary nodule. Received TNK at 1656. Patient admitted to NSICU for post-tenecteplase monitoring. TTE unremarkable. 24hr CTH neg for bleed. Refusing SARI. MRI brain without acute infarction. 8 mm nonspecific focus of FLAIR prolongation right cerebellar hemisphere, situated in the region of calcification on CT, of uncertain etiologyand significance. Pending psych consult for depressive thoughts.     Neuro  #CVA workup  - DAPT  - c/w atorvastatin 80 once daily  - MRI brain without acute infarction. 8 mm nonspecific focus of FLAIR prolongation right cerebellar hemisphere, situated in the region of calcification on CT, of uncertain etiologyand significance  - PT/OT  - Stroke education    Cards  #HTN  - 100-180  - hold home blood pressure medication for now  - TTE unremarkable  - refusing SARI    #HLD  - high dose statin as above in CVA  - LDL results: 141    Pulm  - call provider if SPO2 < 94%    GI  #Nutrition/Fluids/Electrolytes   - replete K<4 and Mg <2  - Diet: CC diet, refused MBS, patient reports better swallowing today  - IVF: avoid fluids containing dextrose     Renal  - monitor and trend Cr    Infectious Disease  - jack    Endocrine  #DM  - A1C results: 6.5  - SSI    - TSH results: 0.747    Psych  - obtain psych c/s in AM for depressive thoughts  - denies SI/HI/AVH    DVT Prophylaxis  - lovenox sq  - SCDs for DVT prophylaxis     Dispo: Pending PT/OT evaluation     Discussed daily hospital plans and goals with patient.      Discussed with Dr. Araujo and Dr. Finley

## 2024-03-21 LAB
ANION GAP SERPL CALC-SCNC: 9 MMOL/L — SIGNIFICANT CHANGE UP (ref 5–17)
BUN SERPL-MCNC: 21 MG/DL — SIGNIFICANT CHANGE UP (ref 7–23)
CALCIUM SERPL-MCNC: 9.9 MG/DL — SIGNIFICANT CHANGE UP (ref 8.4–10.5)
CHLORIDE SERPL-SCNC: 105 MMOL/L — SIGNIFICANT CHANGE UP (ref 96–108)
CO2 SERPL-SCNC: 21 MMOL/L — LOW (ref 22–31)
CREAT SERPL-MCNC: 0.81 MG/DL — SIGNIFICANT CHANGE UP (ref 0.5–1.3)
EGFR: 80 ML/MIN/1.73M2 — SIGNIFICANT CHANGE UP
GLUCOSE SERPL-MCNC: 108 MG/DL — HIGH (ref 70–99)
HCT VFR BLD CALC: 41 % — SIGNIFICANT CHANGE UP (ref 34.5–45)
HGB BLD-MCNC: 13.9 G/DL — SIGNIFICANT CHANGE UP (ref 11.5–15.5)
MAGNESIUM SERPL-MCNC: 2.3 MG/DL — SIGNIFICANT CHANGE UP (ref 1.6–2.6)
MCHC RBC-ENTMCNC: 31.7 PG — SIGNIFICANT CHANGE UP (ref 27–34)
MCHC RBC-ENTMCNC: 33.9 GM/DL — SIGNIFICANT CHANGE UP (ref 32–36)
MCV RBC AUTO: 93.6 FL — SIGNIFICANT CHANGE UP (ref 80–100)
NRBC # BLD: 0 /100 WBCS — SIGNIFICANT CHANGE UP (ref 0–0)
PHOSPHATE SERPL-MCNC: 4.1 MG/DL — SIGNIFICANT CHANGE UP (ref 2.5–4.5)
PLATELET # BLD AUTO: 359 K/UL — SIGNIFICANT CHANGE UP (ref 150–400)
POTASSIUM SERPL-MCNC: 4.2 MMOL/L — SIGNIFICANT CHANGE UP (ref 3.5–5.3)
POTASSIUM SERPL-SCNC: 4.2 MMOL/L — SIGNIFICANT CHANGE UP (ref 3.5–5.3)
RBC # BLD: 4.38 M/UL — SIGNIFICANT CHANGE UP (ref 3.8–5.2)
RBC # FLD: 12.4 % — SIGNIFICANT CHANGE UP (ref 10.3–14.5)
SODIUM SERPL-SCNC: 135 MMOL/L — SIGNIFICANT CHANGE UP (ref 135–145)
WBC # BLD: 9.28 K/UL — SIGNIFICANT CHANGE UP (ref 3.8–10.5)
WBC # FLD AUTO: 9.28 K/UL — SIGNIFICANT CHANGE UP (ref 3.8–10.5)

## 2024-03-21 PROCEDURE — 99223 1ST HOSP IP/OBS HIGH 75: CPT

## 2024-03-21 PROCEDURE — 99233 SBSQ HOSP IP/OBS HIGH 50: CPT

## 2024-03-21 RX ORDER — IBUPROFEN 200 MG
400 TABLET ORAL ONCE
Refills: 0 | Status: COMPLETED | OUTPATIENT
Start: 2024-03-21 | End: 2024-03-21

## 2024-03-21 RX ORDER — PANTOPRAZOLE SODIUM 20 MG/1
40 TABLET, DELAYED RELEASE ORAL
Refills: 0 | Status: DISCONTINUED | OUTPATIENT
Start: 2024-03-21 | End: 2024-03-22

## 2024-03-21 RX ORDER — OLANZAPINE 15 MG/1
5 TABLET, FILM COATED ORAL ONCE
Refills: 0 | Status: DISCONTINUED | OUTPATIENT
Start: 2024-03-21 | End: 2024-03-22

## 2024-03-21 RX ADMIN — Medication 1 PATCH: at 18:48

## 2024-03-21 RX ADMIN — Medication 400 MILLIGRAM(S): at 23:25

## 2024-03-21 RX ADMIN — PANTOPRAZOLE SODIUM 40 MILLIGRAM(S): 20 TABLET, DELAYED RELEASE ORAL at 23:49

## 2024-03-21 RX ADMIN — Medication 81 MILLIGRAM(S): at 13:02

## 2024-03-21 RX ADMIN — Medication 1 PATCH: at 12:58

## 2024-03-21 RX ADMIN — Medication 1 PATCH: at 06:25

## 2024-03-21 RX ADMIN — LIDOCAINE 1 PATCH: 4 CREAM TOPICAL at 23:22

## 2024-03-21 RX ADMIN — Medication 400 MILLIGRAM(S): at 22:33

## 2024-03-21 RX ADMIN — Medication 1 PATCH: at 13:00

## 2024-03-21 RX ADMIN — CLOPIDOGREL BISULFATE 75 MILLIGRAM(S): 75 TABLET, FILM COATED ORAL at 13:01

## 2024-03-21 NOTE — DIETITIAN INITIAL EVALUATION ADULT - PERTINENT LABORATORY DATA
03-21    135  |  105  |  21  ----------------------------<  108<H>  4.2   |  21<L>  |  0.81    Ca    9.9      21 Mar 2024 05:30  Phos  4.1     03-21  Mg     2.3     03-21    A1C with Estimated Average Glucose Result: 6.5 % (03-19-24 @ 05:30)

## 2024-03-21 NOTE — DIETITIAN INITIAL EVALUATION ADULT - NUTRITIONGOAL OUTCOME1
Pt will meet 75% or more of protein/energy needs via most appropriate route for nutrition.   Pt wt will remain stable +/-10% while inpatient

## 2024-03-21 NOTE — BH CONSULTATION LIAISON ASSESSMENT NOTE - OTHER PAST PSYCHIATRIC HISTORY (INCLUDE DETAILS REGARDING ONSET, COURSE OF ILLNESS, INPATIENT/OUTPATIENT TREATMENT)
Was told she had anxiety 20 years ago, followed with a psychiatrist at that time and was on a short period of anti-anxiety medications, has not followed up with a psychiatrist or taken psychotropics since. No hx IP or SIB/SA

## 2024-03-21 NOTE — PROGRESS NOTE ADULT - ASSESSMENT
68y Female with PMHx of tobacco use disorder, HTN, COPD, DM, angina presenting to ED for SOB and L sided weakness/numbness. LKW 1pm on 3/18 when sx started. NIHSS 8. L NLFF, LUE 3/5, LLE 2/5, LUE/LLE numbness. CTH no acute intracranial hemorrhage, mass effect or large demarcated territorial infarction. Punctate linear density in the right cerebellar hemisphere likely representing calcification. Intracranial CTA: No large vessel occlusion. Supraclinoid left ICA aneurysm versus infundibulum. Extracranial CTA: Moderate stenosis the proximal bilateral internal carotid artery secondary to mixed calcified and noncalcified plaque. Moderate stenosis the proximal left subclavian artery. 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 5 mm right upper lobe pulmonary nodule. Received TNK at 1656. Patient admitted to NSICU for post-tenecteplase monitoring. TTE unremarkable. 24hr CTH neg for bleed. Refusing SARI. MRI brain without acute infarction. 8 mm nonspecific focus of FLAIR prolongation right cerebellar hemisphere, situated in the region of calcification on CT, of uncertain etiologyand significance. Pending psych consult for depressive thoughts.     Neuro  #CVA workup  - DAPT  - c/w atorvastatin 80 once daily  - MRI brain without acute infarction. 8 mm nonspecific focus of FLAIR prolongation right cerebellar hemisphere, situated in the region of calcification on CT, of uncertain etiologyand significance  - PT/OT following -- final recs pending controlled BP w/ functional mobility   - Stroke education    Cards  #HTN  - 100-180  - hold home blood pressure medication for now  - TTE unremarkable  - refusing SARI    #HLD  - high dose statin as above in CVA  - LDL results: 141    Pulm  - call provider if SPO2 < 94%    GI  #Nutrition/Fluids/Electrolytes   - replete K<4 and Mg <2  - Diet: CC diet, refused MBS, patient reports better swallowing today  - IVF: avoid fluids containing dextrose     Renal  - monitor and trend Cr    Infectious Disease  - jack    Endocrine  #DM  - A1C results: 6.5  - SSI  - TSH results: 0.747    Psych  - obtain psych c/s in AM for depressive thoughts  - denies SI/HI/AVH    DVT Prophylaxis  - lovenox sq  - SCDs for DVT prophylaxis  68y Female with PMHx of tobacco use disorder, HTN, COPD, DM, angina presenting to ED for SOB and L sided weakness/numbness. LKW 1pm on 3/18 when sx started. NIHSS 8. L NLFF, LUE 3/5, LLE 2/5, LUE/LLE numbness. CTH no acute intracranial hemorrhage, mass effect or large demarcated territorial infarction. Punctate linear density in the right cerebellar hemisphere likely representing calcification. Intracranial CTA: No large vessel occlusion. Supraclinoid left ICA aneurysm versus infundibulum. Extracranial CTA: Moderate stenosis the proximal bilateral internal carotid artery secondary to mixed calcified and noncalcified plaque. Moderate stenosis the proximal left subclavian artery. 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 5 mm right upper lobe pulmonary nodule. Received TNK at 1656. Patient admitted to NSICU for post-tenecteplase monitoring. TTE unremarkable. 24hr CTH neg for bleed. Refusing SARI. MRI brain without acute infarction. 8 mm nonspecific focus of FLAIR prolongation right cerebellar hemisphere, situated in the region of calcification on CT, of uncertain etiologyand significance. Pending psych consult for depressive thoughts.     Neuro  #CVA workup  - DAPT  - c/w atorvastatin 80 once daily  - MRI brain without acute infarction. 8 mm nonspecific focus of FLAIR prolongation right cerebellar hemisphere, situated in the region of calcification on CT, of uncertain etiologyand significance  - PT/OT following -- final recs pending controlled BP w/ functional mobility   - Stroke education    Cards  #HTN  - 100-180  - hold home blood pressure medication for now  - TTE unremarkable  - refusing SARI    #HLD  - high dose statin as above in CVA  - LDL results: 141    Pulm  - call provider if SPO2 < 94%    GI  #Nutrition/Fluids/Electrolytes   - replete K<4 and Mg <2  - Diet: CC diet, refused MBS, patient reports better swallowing today  - IVF: avoid fluids containing dextrose     Renal  - monitor and trend Cr    Infectious Disease  - jack    Endocrine  #DM  - A1C results: 6.5  - SSI  - TSH results: 0.747    Psych  - Psych consulted and pending formal eval for depressive symptoms and capacity   - denies SI/HI/AVH    DVT Prophylaxis  - lovenox sq  - SCDs for DVT prophylaxis

## 2024-03-21 NOTE — PROGRESS NOTE ADULT - SUBJECTIVE AND OBJECTIVE BOX
VICKY AALMO, 68y, Female  MRN-[id number]  Patient is a 68y old  Female who presents with a chief complaint of Acute CVA, s/p TNK (18 Mar 2024 19:02)      OVERNIGHT EVENTS:     SUBJECTIVE:    12 Point ROS Negative unless noted otherwise above.  -------------------------------------------------------------------------------  VITAL SIGNS:  Vital Signs Last 24 Hrs  T(C): 36.2 (21 Mar 2024 06:30), Max: 36.9 (20 Mar 2024 13:46)  T(F): 97.2 (21 Mar 2024 06:30), Max: 98.5 (20 Mar 2024 13:46)  HR: 76 (21 Mar 2024 04:10) (62 - 102)  BP: 157/67 (21 Mar 2024 04:10) (134/61 - 223/96)  BP(mean): 97 (21 Mar 2024 04:10) (88 - 138)  RR: 18 (21 Mar 2024 04:10) (18 - 20)  SpO2: 97% (21 Mar 2024 04:10) (95% - 98%)    Parameters below as of 21 Mar 2024 04:10  Patient On (Oxygen Delivery Method): room air      I&O's Summary    20 Mar 2024 07:01  -  21 Mar 2024 07:00  --------------------------------------------------------  IN: 0 mL / OUT: 1 mL / NET: -1 mL        PHYSICAL EXAM:    General: NAD  HEENT: NC/AT; anicteric sclera; moist mucosal membranes.  Neck: supple, trachea midline  Cardiovascular: RRR, +S1/S2; NO M/R/G  Respiratory: no respiratory distress  Gastrointestinal: soft, NT/ND;  Extremities: WWP; no edema or cyanosis  Neurological: AAOx    ALLERGIES:  Allergies    No Known Allergies    Intolerances        MEDICATIONS:  MEDICATIONS  (STANDING):  aspirin enteric coated 81 milliGRAM(s) Oral daily  atorvastatin 80 milliGRAM(s) Oral at bedtime  clopidogrel Tablet 75 milliGRAM(s) Oral daily  enoxaparin Injectable 40 milliGRAM(s) SubCutaneous every 24 hours  influenza  Vaccine (HIGH DOSE) 0.7 milliLiter(s) IntraMuscular once  lactated ringers. 1000 milliLiter(s) (30 mL/Hr) IV Continuous <Continuous>  lidocaine   4% Patch 1 Patch Transdermal every 24 hours  melatonin 5 milliGRAM(s) Oral at bedtime  nicotine -  14 mG/24Hr(s) Patch 1 Patch Transdermal daily    MEDICATIONS  (PRN):  artificial  tears Solution 1 Drop(s) Both EYES every 2 hours PRN Dry Eyes  hydrALAZINE Injectable 10 milliGRAM(s) IV Push every 4 hours PRN SBP>180  senna 2 Tablet(s) Oral at bedtime PRN Constipation      -------------------------------------------------------------------------------  LABS:                        13.9   9.28  )-----------( 359      ( 21 Mar 2024 05:30 )             41.0     03-21    135  |  105  |  21  ----------------------------<  108<H>  4.2   |  21<L>  |  0.81    Ca    9.9      21 Mar 2024 05:30  Phos  4.1     03-21  Mg     2.3     03-21          Urinalysis Basic - ( 21 Mar 2024 05:30 )    Color: x / Appearance: x / SG: x / pH: x  Gluc: 108 mg/dL / Ketone: x  / Bili: x / Urobili: x   Blood: x / Protein: x / Nitrite: x   Leuk Esterase: x / RBC: x / WBC x   Sq Epi: x / Non Sq Epi: x / Bacteria: x      CAPILLARY BLOOD GLUCOSE              RADIOLOGY & ADDITIONAL TESTS: Reviewed.   VICKY ALAMO, 68y, Female  MRN-[id number]  Patient is a 68y old  Female who presents with a chief complaint of Acute CVA, s/p TNK (18 Mar 2024 19:02)      OVERNIGHT EVENTS: Overnight, patient wanting to leave the hospital. Required PO Ativan to help with relaxation and sleep  SUBJECTIVE: Pt seen/examined at bedside. Pt stating she was irritated with the hospital food an ordering, etc however her issues were resolved. She states she is feeling okay today. Denies any new complaints.     12 Point ROS Negative unless noted otherwise above.  -------------------------------------------------------------------------------  VITAL SIGNS:  Vital Signs Last 24 Hrs  T(C): 36.2 (21 Mar 2024 06:30), Max: 36.9 (20 Mar 2024 13:46)  T(F): 97.2 (21 Mar 2024 06:30), Max: 98.5 (20 Mar 2024 13:46)  HR: 76 (21 Mar 2024 04:10) (62 - 102)  BP: 157/67 (21 Mar 2024 04:10) (134/61 - 223/96)  BP(mean): 97 (21 Mar 2024 04:10) (88 - 138)  RR: 18 (21 Mar 2024 04:10) (18 - 20)  SpO2: 97% (21 Mar 2024 04:10) (95% - 98%)    Parameters below as of 21 Mar 2024 04:10  Patient On (Oxygen Delivery Method): room air      I&O's Summary    20 Mar 2024 07:01  -  21 Mar 2024 07:00  --------------------------------------------------------  IN: 0 mL / OUT: 1 mL / NET: -1 mL        PHYSICAL EXAM:    General: NAD  HEENT: NC/AT; anicteric sclera; moist mucosal membranes.  Neck: supple, trachea midline  Cardiovascular: RRR, +S1/S2; NO M/R/G  Respiratory: no respiratory distress  Gastrointestinal: soft, NT/ND;  Extremities: WWP; no edema or cyanosis  Neurological: AAOx3    ALLERGIES:  Allergies    No Known Allergies    Intolerances    MEDICATIONS:  MEDICATIONS  (STANDING):  aspirin enteric coated 81 milliGRAM(s) Oral daily  atorvastatin 80 milliGRAM(s) Oral at bedtime  clopidogrel Tablet 75 milliGRAM(s) Oral daily  enoxaparin Injectable 40 milliGRAM(s) SubCutaneous every 24 hours  influenza  Vaccine (HIGH DOSE) 0.7 milliLiter(s) IntraMuscular once  lactated ringers. 1000 milliLiter(s) (30 mL/Hr) IV Continuous <Continuous>  lidocaine   4% Patch 1 Patch Transdermal every 24 hours  melatonin 5 milliGRAM(s) Oral at bedtime  nicotine -  14 mG/24Hr(s) Patch 1 Patch Transdermal daily    MEDICATIONS  (PRN):  artificial  tears Solution 1 Drop(s) Both EYES every 2 hours PRN Dry Eyes  hydrALAZINE Injectable 10 milliGRAM(s) IV Push every 4 hours PRN SBP>180  senna 2 Tablet(s) Oral at bedtime PRN Constipation  -------------------------------------------------------------------------------  LABS:                        13.9   9.28  )-----------( 359      ( 21 Mar 2024 05:30 )             41.0     03-21    135  |  105  |  21  ----------------------------<  108<H>  4.2   |  21<L>  |  0.81    Ca    9.9      21 Mar 2024 05:30  Phos  4.1     03-21  Mg     2.3     03-21    Urinalysis Basic - ( 21 Mar 2024 05:30 )    Color: x / Appearance: x / SG: x / pH: x  Gluc: 108 mg/dL / Ketone: x  / Bili: x / Urobili: x   Blood: x / Protein: x / Nitrite: x   Leuk Esterase: x / RBC: x / WBC x   Sq Epi: x / Non Sq Epi: x / Bacteria: x      CAPILLARY BLOOD GLUCOSE              RADIOLOGY & ADDITIONAL TESTS: Reviewed.

## 2024-03-21 NOTE — BH CONSULTATION LIAISON ASSESSMENT NOTE - SUMMARY
68 year old Female, undomiciled nomad, with PMHx of tobacco use disorder, HTN, COPD, DM, angina, denies PPH, no IP, no psychotropics, admitted for stroke after L sided weakness, TNK was given and patient transferred to NSICU. Work up included negative CTH for acute pathology, PT/OT evaluation, pending MRI brain and PT/OT reevaluation. Psych consulted for anxiety. Last night, patient became agitated, wanted to leave the hospital, requiring Ativan 1mg IV x1 and Ativan 2mg PO x1.     On assessment, patient initially appears to be calm, guarded but answering questions appropriately, and associates her agitation last night to her current medical condition. However, on further questioning, patient was discovered to have active persecutory delusions. 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 Differentials include schizophrenia vs delusional disorder vs delirium with psychotic features. At this time, patient is not agreeable to psychotropics. She adamantly believes that her delusions are true and is guarded. She has distrust of the medical team and the community, reports people think she is "crazy". She is only interested in taking medications to help her sleep and anxiety. Will recommend Seroquel to target both issues.     Currently, patient is at a low risk of self harm, not a candidate for IP. Patient is aware of her medical situation, is agreeable to staying for treatment, and open to going to [facility] on discharge.     Recommendations:  - Seroquel 25 QHS prn for anxiety. If not redirectable, can also give additional Seroquel 12.5mg TID prn  - for acute agitation, recommend Xyprexa 2.5mg IM Q8h  - call back for any other questions  68 year old Female, undomiciled nomad, with PMHx of tobacco use disorder, HTN, COPD, DM, angina, denies PPH, no IP, no psychotropics, admitted for stroke after L sided weakness, TNK was given and patient transferred to NSICU. Work up included negative CTH for acute pathology, PT/OT evaluation, pending MRI brain and PT/OT reevaluation. Psych consulted for anxiety. Last night, patient became agitated, wanted to leave the hospital, requiring Ativan 1mg IV x1 and Ativan 2mg PO x1.     On assessment, patient initially appears to be calm, guarded but answering questions appropriately, and associates her agitation last night to her current medical condition. However, on further questioning, patient was discovered to have active persecutory delusions. 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 Differentials include schizophrenia vs delusional disorder vs delirium with psychotic features. At this time, patient is not agreeable to psychotropics. She adamantly believes that her delusions are true and is guarded. She has distrust of the medical team and the community, reports people think she is "crazy". She is only interested in taking medications to help her sleep and anxiety. Will recommend Seroquel to target both issues.     Currently, patient is at a low risk of self harm, not a candidate for IP. Patient is aware of her medical situation, is agreeable to staying for treatment, and open to going to [facility] on discharge. No current acute decisional conflict; not requesting AMA discharge.    Recommendations:  - Routine observation - consider supervised room if concern for elopement risk  - Seroquel 25 QHS prn for insomnia or anxiety; may also help with psychosis (note pt declines antipsychotic medication)  -for agitation that is not redirectable, can also give additional Seroquel 12.5mg TID prn  - for acute agitation, recommend Xyprexa 2.5mg IM Q8h  -monitor for QTc prolongation  -consider MoCA  -delirium precautions  - call back for any other questions

## 2024-03-21 NOTE — BH CONSULTATION LIAISON ASSESSMENT NOTE - NSBHINFOSOURCE_PSY_ALL_CORE
HPI Comments: Arianna Lauren is a 23 y.o. Female, no significant pmhx, who presents ambulatory to the ED c/o gradually worsening L hip pain x 1 week. Pt's pain is exacerbated by standing and ambulating. She does not recall a specific traumatic event that could be causing her pain, but she does endorse frequently jumping down from chairs at her job. Pt's LMP was 1 week ago and denies the possibility of being pregnant. Pt specifically denies bladder pain, vaginal d/c, groin pain, hx of HTN, DM, asthma, seizures or any other allergies. PCP: Darrin Jang MD      Social Hx: -tobacco, -EtOH, -Illicit Drugs   FHx: no pertinent family hx   Medication Allergies: none      There are no other complaints, changes, or physical findings at this time. The history is provided by the patient. No past medical history on file. No past surgical history on file. No family history on file. Social History     Social History    Marital status: SINGLE     Spouse name: N/A    Number of children: N/A    Years of education: N/A     Occupational History    Not on file. Social History Main Topics    Smoking status: Not on file    Smokeless tobacco: Not on file    Alcohol use Not on file    Drug use: Not on file    Sexual activity: Not on file     Other Topics Concern    Not on file     Social History Narrative         ALLERGIES: Review of patient's allergies indicates no known allergies. Review of Systems   Constitutional: Negative for appetite change, chills, diaphoresis, fatigue and fever. HENT: Negative for sore throat and trouble swallowing. Respiratory: Negative for cough and shortness of breath. Cardiovascular: Negative for chest pain. Gastrointestinal: Negative for abdominal pain, diarrhea, nausea and vomiting. Genitourinary: Negative for difficulty urinating, dysuria, frequency and vaginal discharge. No bladder pain     Musculoskeletal: Positive for arthralgias (L hip). Negative for back pain and myalgias. No groin pain   Skin: Negative for color change and rash. Neurological: Negative for weakness and numbness. Hematological: Does not bruise/bleed easily. All other systems reviewed and are negative. Patient Vitals for the past 12 hrs:   Temp Resp BP SpO2   04/20/17 2011 98.3 °F (36.8 °C) 16 103/54 100 %         Physical Exam   GEN: no diaphoresis, well appearing, NAD  HEENT: posterior pharynx normal, no exudate   NECK: supple, no adenopathy  HEART: regular rate and rhythm, no murmur, rubs or gallops  LUNGS: clear to auscultation, no wheezing, rhonchi or rales   ABD: soft, non-tender, non-distended, bowel sounds normal  BACK: no bony tenderness or point tenderness  : no CVAT  EXTREMITIES: warm, no edema, L hip pain on SLR, L hip pain on abduction against force at knee and foot, traction of l leg away and toward hip produces the same pain   SKIN: no rash, lesions or notable nodules   NEURO: no focal deficits, strength 5/5 in all extremities, A&Ox3     MDM  Number of Diagnoses or Management Options  Diagnosis management comments: L hip pain        Amount and/or Complexity of Data Reviewed  Tests in the radiology section of CPT®: reviewed and ordered  Review and summarize past medical records: yes  Independent visualization of images, tracings, or specimens: yes    Patient Progress  Patient progress: stable    ED Course       Procedures  DISCHARGE NOTE:  IMAGING RESULTS:  EXAM: XR HIP LT W OR WO PELV 2-3 VWS  Clinical history: Gradually worsening left hip pain for one week worsened by  standing and bending  INDICATION: Trauma.     COMPARISON: None.     FINDINGS: An AP view of the pelvis and a frogleg lateral view of the left hip  demonstrate no fracture, dislocation or other acute abnormality.     IMPRESSION  IMPRESSION: No acute abnormality. IMPRESSION:  1.  Pain of left hip joint        PLAN:  Current Discharge Medication List      START taking these medications Details   ibuprofen (MOTRIN) 400 mg tablet Take 1 Tab by mouth every eight (8) hours as needed for Pain. Qty: 30 Tab, Refills: 0      acetaminophen-codeine (TYLENOL-CODEINE #3) 300-30 mg per tablet Take 1 Tab by mouth every six (6) hours as needed for Pain. Max Daily Amount: 4 Tabs. Qty: 10 Tab, Refills: 0           Follow-up Information     Follow up With Details Comments Contact Info    [facility] [address]  [facility] [address] [address]    [address] [facility]  [address] [place] [address],[address]  [address] [facility]  [phone]    [address] [facility], [address] [place] [address]  [phone]        Return to ED if worse     DISCHARGE NOTE:  10:23 PM  Pt has been reexamined. Pt has no new complaints, changes, or physical findings. Care plan outlined and precautions discussed. All available results reviewed with pt. All medications reviewed with pt. All of pts questions and concerns addressed. Pt agrees to f/u as instructed and agrees to return to ED upon further deterioration. Pt is ready to go home. Written by Magnolia Urena ED Scribe as dictated by Damien Sylvester MD    ATTESTATION   This note is prepared by Magnolia Urena acting as scribe for MD Damien Lin MD : The scribe's documentation has been prepared under my direction and personally reviewed by me in its entirety. I confirm that the note above accurately reflects all work, treatment, procedures, and medical decision making performed by me. Patient

## 2024-03-21 NOTE — DIETITIAN INITIAL EVALUATION ADULT - PERTINENT MEDS FT
MEDICATIONS  (STANDING):  aspirin enteric coated 81 milliGRAM(s) Oral daily  atorvastatin 80 milliGRAM(s) Oral at bedtime  clopidogrel Tablet 75 milliGRAM(s) Oral daily  enoxaparin Injectable 40 milliGRAM(s) SubCutaneous every 24 hours  influenza  Vaccine (HIGH DOSE) 0.7 milliLiter(s) IntraMuscular once  lactated ringers. 1000 milliLiter(s) (30 mL/Hr) IV Continuous <Continuous>  lidocaine   4% Patch 1 Patch Transdermal every 24 hours  melatonin 5 milliGRAM(s) Oral at bedtime  nicotine -  14 mG/24Hr(s) Patch 1 Patch Transdermal daily    MEDICATIONS  (PRN):  artificial  tears Solution 1 Drop(s) Both EYES every 2 hours PRN Dry Eyes  hydrALAZINE Injectable 10 milliGRAM(s) IV Push every 4 hours PRN SBP>180  senna 2 Tablet(s) Oral at bedtime PRN Constipation

## 2024-03-21 NOTE — DIETITIAN INITIAL EVALUATION ADULT - ADD RECOMMEND
-Continue current diet   -Encourage good PO intake  -Honor food preferences as able; food preferences updated and relayed to kitchen   -Weekly wts; monitor for stable wt trend   -Monitor chemistry, GI function, and skin integrity

## 2024-03-21 NOTE — BH CONSULTATION LIAISON ASSESSMENT NOTE - DIFFERENTIAL
unspecified psychosis, r/o schizophrenia, r/o neurocognitive d/o with psychosis, r/o delirium  r/o adjustment d/o  r/o anxiety d/o

## 2024-03-21 NOTE — BH CONSULTATION LIAISON ASSESSMENT NOTE - NSBHCHARTREVIEWLAB_PSY_A_CORE FT
13.9   9.28  )-----------( 359      ( 21 Mar 2024 05:30 )             41.0     03-21    135  |  105  |  21  ----------------------------<  108<H>  4.2   |  21<L>  |  0.81    Ca    9.9      21 Mar 2024 05:30  Phos  4.1     03-21  Mg     2.3     03-21

## 2024-03-21 NOTE — BH CONSULTATION LIAISON ASSESSMENT NOTE - DESCRIPTION
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 Patient stopped working when she was 62 years old due to losing vision in her R eye.   Also notes to lose her home 6 years ago and since then she has been trying to find a permanent residence. Otherwise, she has been a nomad at hotels and her friends' houses. She has been using her savings to fund her hotel stays   She has medicare.

## 2024-03-21 NOTE — BH CONSULTATION LIAISON ASSESSMENT NOTE - RISK ASSESSMENT
Risk factors: active psychosis, medical condition  Protective factors: [employer], the will to get better, the will to "take down" this organization that has been targetting her   Currently at a low potential risk to herself and others.

## 2024-03-21 NOTE — BH CONSULTATION LIAISON ASSESSMENT NOTE - NSBHCHARTREVIEWVS_PSY_A_CORE FT
Vital Signs Last 24 Hrs  T(C): 36.7 (21 Mar 2024 14:00), Max: 36.9 (20 Mar 2024 17:36)  T(F): 98.1 (21 Mar 2024 14:00), Max: 98.5 (20 Mar 2024 17:36)  HR: 84 (21 Mar 2024 13:13) (76 - 102)  BP: 150/66 (21 Mar 2024 13:13) (134/61 - 223/96)  BP(mean): 95 (21 Mar 2024 13:13) (88 - 138)  RR: 18 (21 Mar 2024 08:12) (18 - 20)  SpO2: 97% (21 Mar 2024 04:10) (97% - 98%)    Parameters below as of 21 Mar 2024 13:13  Patient On (Oxygen Delivery Method): room air

## 2024-03-21 NOTE — BH CONSULTATION LIAISON ASSESSMENT NOTE - NSBHTIMEACTIVITIESPERFORMED_PSY_A_CORE
chart review, pt interview, medical decision making, coordination of care.  time spent excludes time spent teaching

## 2024-03-21 NOTE — BH CONSULTATION LIAISON ASSESSMENT NOTE - NSBHATTESTCOMMENTATTENDFT_PSY_A_CORE
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 Psychiatry consulted today for management of mood and assessment of capacity to leave AMA after pt observed anxious, with thoughts about dying without active SI overnight, requesting to leave the hospital, redirectable to stay, received PRN lorazepam. Per primary team, pending is pending MRI Brain to complete stroke w/u and is also pending repeat PT/OT evaluation to determine disposition.On interview this afternoon, pt found awake, alert, oriented to self/location/date/situation, many bags of belongings at bedside, with organized TP, full affect. Pt states that she is distressed because she “had a stroke” and is now having trouble walking. She states that last night she was upset because she did not feel her needs were being well-attended by staff, though denies concern for intentional mistreatment and denies experiencing SI. Pt reports good effect of lorazepam last night and was able to relax and sleep well. She is now agreeable to remain in the hospital for care, and believes that she needs rehab because she cannot walk. Pt otherwise denies any acute psychiatric c/o, though when asked about herself, shares details about being a “targeted person” over the last 6 years, with elaborate described persecutory delusions, somatic delusions, ideas of reference including thought insertion/broadcasting. Reports some memory loss, with delayed recall 0/3 items on exam, 3/3 with category cuing. Pt reports that she receives therapy from a Brenda Zamarripa who is an expert in the field of being targeted and “gang stalked” and does not need further psychiatric treatment because it is “all real”. She is agreeable to trial of medication for anxiety and insomnia. No described AVH, no SI/HI, no sx suggestive of a major depressive episode or joseph, no substance use.  Imp: is of pt with suspected chronic psychosis, r/o primary psychotic disorder v. neurocognitive disorder with psychosis, less likely new psychosis 2/2 delirium or s/p CVA given described time course. Psychosis appears distressing but not debilitating to patient, who has very poor insight into mental illness and is not motivated for treatment. No acute safety concerns that would warrant involuntary treatment, no SI/HI. No ongoing decisional conflict – pt is agreeable to remain in hospital for care, and appears to understand her illness and recommended treatment. Recs: Routine observation. Consider quetiapine 25mg po QHS PRN for insomnia and anxiety. Consider quetiapine PRN as above for acute anxiety or agitation. Pt declines additional psychiatric treatment at this time – will provide OP referral options. No psychiatric barrier to dc when medically ready.

## 2024-03-21 NOTE — BH CONSULTATION LIAISON ASSESSMENT NOTE - NSBHCONSULTFOLLOWAFTERCARE_PSY_A_CORE FT
· [facility]    o Telehealth visits    o [address] [place] [address]    o [phone]    o Medicare, Medicaid, most private insurance can provide referral information for:  •	[facility]  o	Walk in services, Monday – Friday: 7:30am – 1pm   o	[address]   [place] [address]  o	[phone]

## 2024-03-21 NOTE — PROGRESS NOTE ADULT - SUBJECTIVE AND OBJECTIVE BOX
Patient is a 68y old  Female who presents with a chief complaint of WEAKNESS OF LEFT SIDE OFBODY     (21 Mar 2024 15:26)      INTERVAL HPI/OVERNIGHT EVENTS:    Pt. seen and examined earlier today  Pt. reports her L side is stronger  No new complaints    Review of Systems: 12 point review of systems otherwise negative    MEDICATIONS  (STANDING):  aspirin enteric coated 81 milliGRAM(s) Oral daily  atorvastatin 80 milliGRAM(s) Oral at bedtime  clopidogrel Tablet 75 milliGRAM(s) Oral daily  enoxaparin Injectable 40 milliGRAM(s) SubCutaneous every 24 hours  influenza  Vaccine (HIGH DOSE) 0.7 milliLiter(s) IntraMuscular once  lactated ringers. 1000 milliLiter(s) (30 mL/Hr) IV Continuous <Continuous>  lidocaine   4% Patch 1 Patch Transdermal every 24 hours  melatonin 5 milliGRAM(s) Oral at bedtime  nicotine -  14 mG/24Hr(s) Patch 1 Patch Transdermal daily    MEDICATIONS  (PRN):  artificial  tears Solution 1 Drop(s) Both EYES every 2 hours PRN Dry Eyes  hydrALAZINE Injectable 10 milliGRAM(s) IV Push every 4 hours PRN SBP>180  senna 2 Tablet(s) Oral at bedtime PRN Constipation      Allergies    No Known Allergies    Intolerances          Vital Signs Last 24 Hrs  T(C): 36.7 (21 Mar 2024 14:00), Max: 36.9 (20 Mar 2024 17:36)  T(F): 98.1 (21 Mar 2024 14:00), Max: 98.5 (20 Mar 2024 17:36)  HR: 84 (21 Mar 2024 13:13) (76 - 102)  BP: 150/66 (21 Mar 2024 13:13) (134/61 - 223/96)  BP(mean): 95 (21 Mar 2024 13:13) (88 - 138)  RR: 18 (21 Mar 2024 08:12) (18 - 20)  SpO2: 97% (21 Mar 2024 04:10) (97% - 98%)    Parameters below as of 21 Mar 2024 13:13  Patient On (Oxygen Delivery Method): room air      CAPILLARY BLOOD GLUCOSE          03-20 @ 07:01  -  03-21 @ 07:00  --------------------------------------------------------  IN: 0 mL / OUT: 1 mL / NET: -1 mL        Physical Exam:  (earlier today)  Daily     Daily   General:  comfortable-appearing in NAD  HEENT:  MMM  CV:  RRR  Lungs:  CTA B/L  Abdomen:  soft NT ND  Extremities:  no edema B/L LE  Skin:  WWP  Neuro:  AAOx3, no dysarthria, strength 4+/5 LUE and LLE    LABS:                        13.9   9.28  )-----------( 359      ( 21 Mar 2024 05:30 )             41.0     03-21    135  |  105  |  21  ----------------------------<  108<H>  4.2   |  21<L>  |  0.81    Ca    9.9      21 Mar 2024 05:30  Phos  4.1     03-21  Mg     2.3     03-21        Urinalysis Basic - ( 21 Mar 2024 05:30 )    Color: x / Appearance: x / SG: x / pH: x  Gluc: 108 mg/dL / Ketone: x  / Bili: x / Urobili: x   Blood: x / Protein: x / Nitrite: x   Leuk Esterase: x / RBC: x / WBC x   Sq Epi: x / Non Sq Epi: x / Bacteria: x

## 2024-03-21 NOTE — DIETITIAN INITIAL EVALUATION ADULT - OTHER INFO
68y Female with PMHx of tobacco use disorder, HTN, COPD, DM, angina presenting to ED for SOB and L sided weakness/numbness. LKW 1pm on 3/18 when sx started. NIHSS 8. L NLFF, LUE 3/5, LLE 2/5, LUE/LLE numbness. CTH no acute intracranial hemorrhage, mass effect or large demarcated territorial infarction. Punctate linear density in the right cerebellar hemisphere likely representing calcification. Intracranial CTA: No large vessel occlusion. Supraclinoid left ICA aneurysm versus infundibulum. Extracranial CTA: Moderate stenosis the proximal bilateral internal carotid artery secondary to mixed calcified and noncalcified plaque. Moderate stenosis the proximal left subclavian artery. 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 5 mm right upper lobe pulmonary nodule. Received TNK at 1656. Patient admitted to NSICU for post-tenecteplase monitoring. TTE unremarkable. 24hr CTH neg for bleed. Refusing SARI. MRI brain without acute infarction. 8 mm nonspecific focus of FLAIR prolongation right cerebellar hemisphere, situated in the region of calcification on CT, of uncertain etiologyand significance. Pending psych consult for depressive thoughts.     Pt assessed at bedside on 7LA. Rx and labs reviewed. Pt presents with no overt signs of nutritional wasting. Pt received resting in bed alert and participatory in nutrition assessment. Pt reports a good appetite and reflective PO intake. Reports a usual body weight of 126 pounds; current body weight of 119 pounds. Pt feels she has not had any significant changes to appetite or PO intake. Discrepancy between reported usual and actual body wt; continue to trend wts weekly while inpatient and consider oral nutrition supplement regimen to support controlled wt gain toward usual wt if appropriate. Reviewed diet therapy; pt agreeable and verbalized understanding. Discussed meal choices; food preferences updated and relayed to kitchen. No complaints of nausea/vomiting/constipation/diarrhea or difficult chew/swallow. NKA to food. RDN will continue to reassess, intervene, and monitor as appropriate.     Pain: 0 per chart review   GI: Abdomen non-distended/non-tender, +BS x4, last bowel movement PTA   Skin: Warm/Dry/Intact, no edema assessed

## 2024-03-21 NOTE — BH CONSULTATION LIAISON ASSESSMENT NOTE - HPI (INCLUDE ILLNESS QUALITY, SEVERITY, DURATION, TIMING, CONTEXT, MODIFYING FACTORS, ASSOCIATED SIGNS AND SYMPTOMS)
68y Female with PMHx of tobacco use disorder, HTN, COPD, DM, angina, denies PPH, no IP, no psychotropics, presenting to ED for SOB and L sided weakness/numbness. TNK was given and patient transferred to NSICU. Work up include negative CTH for acute pathology, PT/OT evaluation. Pending MRI brain and PT/OT reevaluation. Psych consulted for anxiety. Last night, patient became agitated, wanted to leave the hospital, requiring Ativan 1mg IV x1 and Ativan 2mg PO x1.     On approach today, patient lying down in bed, calm, cooperative, but would become slightly irritable when speaking about her care here and how people see her as "crazy". When inquired about what had happened last night, pt reports that she has not been given the care that she needs. She reports that her food order was wrong and her nurse wasn't attending to her when she needs it. She feels better today but reports having depressive thoughts given her recent stroke. She states that her immobility plays a big factor into that. Reports having poor sleep for many years but had really good sleep last night after given the Ativan.     Patient mentioned that she is a "targeted individual". When inquired further, patient reports she realized that she was targeted starting 6 years ago and believes that her brother plays a part in it. She feels that she has been targeted by large groups of people. She reports that they would "run you off the road" or "takes things out of your house and put it back". She also brought up multiple historical events/people that were related to this. Specifically, she mentions [employer]ruit[employer] and Chava Lawrence as people who was involved in this group. She thinks that her living situation and health issues has been affected by this group of people. She mentions that they can understand her thoughts but unable to explain how and mentions "mind control" as one of their methods, specifically mentioning "V2K". She denies auditory hallucinations. She has distrust of the medical team and the community, reports people think she is "crazy".    Patient follows up with Brenda Zamarripa ([phone]). She states that Ms. Zamarripa knows her condition well and has been helping her with paperwork and legality regarding being a targeted individual.

## 2024-03-21 NOTE — DIETITIAN INITIAL EVALUATION ADULT - OTHER CALCULATIONS
Estimated nutritional needs determined using clinical judgement considering wt discrepancy; used current body weight adjusted for maintenance with increased protein needs.

## 2024-03-22 ENCOUNTER — TRANSCRIPTION ENCOUNTER (OUTPATIENT)
Age: 69
End: 2024-03-22

## 2024-03-22 VITALS — TEMPERATURE: 98 F

## 2024-03-22 DIAGNOSIS — F41.9 ANXIETY DISORDER, UNSPECIFIED: ICD-10-CM

## 2024-03-22 PROCEDURE — 0042T: CPT | Mod: MC

## 2024-03-22 PROCEDURE — 97165 OT EVAL LOW COMPLEX 30 MIN: CPT

## 2024-03-22 PROCEDURE — 93306 TTE W/DOPPLER COMPLETE: CPT

## 2024-03-22 PROCEDURE — 85025 COMPLETE CBC W/AUTO DIFF WBC: CPT

## 2024-03-22 PROCEDURE — 97530 THERAPEUTIC ACTIVITIES: CPT

## 2024-03-22 PROCEDURE — 70551 MRI BRAIN STEM W/O DYE: CPT | Mod: MC

## 2024-03-22 PROCEDURE — 36415 COLL VENOUS BLD VENIPUNCTURE: CPT

## 2024-03-22 PROCEDURE — 97112 NEUROMUSCULAR REEDUCATION: CPT

## 2024-03-22 PROCEDURE — 87637 SARSCOV2&INF A&B&RSV AMP PRB: CPT

## 2024-03-22 PROCEDURE — 97116 GAIT TRAINING THERAPY: CPT

## 2024-03-22 PROCEDURE — 99285 EMERGENCY DEPT VISIT HI MDM: CPT

## 2024-03-22 PROCEDURE — 70496 CT ANGIOGRAPHY HEAD: CPT | Mod: MC

## 2024-03-22 PROCEDURE — 83880 ASSAY OF NATRIURETIC PEPTIDE: CPT

## 2024-03-22 PROCEDURE — 83036 HEMOGLOBIN GLYCOSYLATED A1C: CPT

## 2024-03-22 PROCEDURE — 70450 CT HEAD/BRAIN W/O DYE: CPT | Mod: MC

## 2024-03-22 PROCEDURE — 84484 ASSAY OF TROPONIN QUANT: CPT

## 2024-03-22 PROCEDURE — 86803 HEPATITIS C AB TEST: CPT

## 2024-03-22 PROCEDURE — 85027 COMPLETE CBC AUTOMATED: CPT

## 2024-03-22 PROCEDURE — 99233 SBSQ HOSP IP/OBS HIGH 50: CPT

## 2024-03-22 PROCEDURE — 70498 CT ANGIOGRAPHY NECK: CPT | Mod: MC

## 2024-03-22 PROCEDURE — 99238 HOSP IP/OBS DSCHRG MGMT 30/<: CPT

## 2024-03-22 PROCEDURE — 82962 GLUCOSE BLOOD TEST: CPT

## 2024-03-22 PROCEDURE — 84443 ASSAY THYROID STIM HORMONE: CPT

## 2024-03-22 PROCEDURE — 71045 X-RAY EXAM CHEST 1 VIEW: CPT

## 2024-03-22 PROCEDURE — 80061 LIPID PANEL: CPT

## 2024-03-22 PROCEDURE — 84100 ASSAY OF PHOSPHORUS: CPT

## 2024-03-22 PROCEDURE — 80053 COMPREHEN METABOLIC PANEL: CPT

## 2024-03-22 PROCEDURE — 85610 PROTHROMBIN TIME: CPT

## 2024-03-22 PROCEDURE — 80048 BASIC METABOLIC PNL TOTAL CA: CPT

## 2024-03-22 PROCEDURE — 85730 THROMBOPLASTIN TIME PARTIAL: CPT

## 2024-03-22 PROCEDURE — 83735 ASSAY OF MAGNESIUM: CPT

## 2024-03-22 RX ORDER — SENNA PLUS 8.6 MG/1
2 TABLET ORAL
Qty: 0 | Refills: 0 | DISCHARGE
Start: 2024-03-22

## 2024-03-22 RX ORDER — PANTOPRAZOLE SODIUM 20 MG/1
1 TABLET, DELAYED RELEASE ORAL
Qty: 0 | Refills: 0 | DISCHARGE
Start: 2024-03-22

## 2024-03-22 RX ORDER — ATORVASTATIN CALCIUM 80 MG/1
1 TABLET, FILM COATED ORAL
Qty: 0 | Refills: 0 | DISCHARGE
Start: 2024-03-22

## 2024-03-22 RX ORDER — NICOTINE POLACRILEX 2 MG
1 GUM BUCCAL
Qty: 0 | Refills: 0 | DISCHARGE
Start: 2024-03-22

## 2024-03-22 RX ORDER — ASPIRIN/CALCIUM CARB/MAGNESIUM 324 MG
1 TABLET ORAL
Qty: 0 | Refills: 0 | DISCHARGE
Start: 2024-03-22

## 2024-03-22 RX ORDER — QUETIAPINE FUMARATE 200 MG/1
25 TABLET, FILM COATED ORAL ONCE
Refills: 0 | Status: COMPLETED | OUTPATIENT
Start: 2024-03-22 | End: 2024-03-22

## 2024-03-22 RX ORDER — CALCIUM CARBONATE 500(1250)
1 TABLET ORAL ONCE
Refills: 0 | Status: COMPLETED | OUTPATIENT
Start: 2024-03-22 | End: 2024-03-22

## 2024-03-22 RX ORDER — LIDOCAINE 4 G/100G
1 CREAM TOPICAL
Qty: 0 | Refills: 0 | DISCHARGE
Start: 2024-03-22

## 2024-03-22 RX ADMIN — LIDOCAINE 1 PATCH: 4 CREAM TOPICAL at 06:53

## 2024-03-22 RX ADMIN — Medication 1 PATCH: at 06:53

## 2024-03-22 RX ADMIN — Medication 1 PATCH: at 12:36

## 2024-03-22 RX ADMIN — Medication 1 TABLET(S): at 03:26

## 2024-03-22 RX ADMIN — QUETIAPINE FUMARATE 25 MILLIGRAM(S): 200 TABLET, FILM COATED ORAL at 03:00

## 2024-03-22 NOTE — DISCHARGE NOTE PROVIDER - CARE PROVIDER_API CALL
Chuyita Beach NP in Family Health  [address]  [place] [address]  Phone: [phone]  Fax: [phone]  Scheduled Appointment: 04/04/2024 02:00 PM

## 2024-03-22 NOTE — PROGRESS NOTE ADULT - PROBLEM SELECTOR PLAN 3
HbA1c 6.5% at goal  -- monitor blood glucose  -- moderate-dose TETE
HbA1c 6.5% at goal  -- monitor blood glucose  -- moderate-dose TETE

## 2024-03-22 NOTE — DISCHARGE NOTE NURSING/CASE MANAGEMENT/SOCIAL WORK - NSDCPEFALRISK_GEN_ALL_CORE
For information on Fall & Injury Prevention, visit: [email or web address] OR  [email or web address] OR  https://www.cdc.gov/steadi/patient.html

## 2024-03-22 NOTE — PROGRESS NOTE ADULT - PROBLEM SELECTOR PLAN 1
Pt. c/o L-sided numbness/weakness and dysphagia, c/f stroke; Pt. given TNK; sx subsequently improving; no e/o infarct on MRI (possibly TNK-aborted?)  -- cont. work-up and mgmt per Neuro  -- PT/OT, swallow therapy  -- on DAPT + high-intensity statin
Pt. c/o L-sided numbness/weakness and dysphagia, c/f stroke; Pt. given TNK; sx subsequently improving; no e/o infarct on MRI  -- cont. work-up and mgmt per Neuro  -- PT/OT, swallow therapy  -- on DAPT + high-intensity statin

## 2024-03-22 NOTE — PROGRESS NOTE ADULT - SUBJECTIVE AND OBJECTIVE BOX
Patient is a 68y old  Female who presents with a chief complaint of left sided weakness and numbness (22 Mar 2024 12:01)      INTERVAL HPI/OVERNIGHT EVENTS:    Pt. seen and examined earlier today  Pt. c/o nosebleed O/N, self-resolved without intervention  Pt. reports her L side is stronger    Review of Systems: 12 point review of systems otherwise negative    MEDICATIONS  (STANDING):  aspirin enteric coated 81 milliGRAM(s) Oral daily  atorvastatin 80 milliGRAM(s) Oral at bedtime  clopidogrel Tablet 75 milliGRAM(s) Oral daily  enoxaparin Injectable 40 milliGRAM(s) SubCutaneous every 24 hours  influenza  Vaccine (HIGH DOSE) 0.7 milliLiter(s) IntraMuscular once  lactated ringers. 1000 milliLiter(s) (30 mL/Hr) IV Continuous <Continuous>  lidocaine   4% Patch 1 Patch Transdermal every 24 hours  melatonin 5 milliGRAM(s) Oral at bedtime  nicotine -  14 mG/24Hr(s) Patch 1 Patch Transdermal daily  pantoprazole    Tablet 40 milliGRAM(s) Oral before breakfast    MEDICATIONS  (PRN):  artificial  tears Solution 1 Drop(s) Both EYES every 2 hours PRN Dry Eyes  hydrALAZINE Injectable 10 milliGRAM(s) IV Push every 4 hours PRN SBP>180  OLANZapine 5 milliGRAM(s) Oral once PRN agitation, anxiety  senna 2 Tablet(s) Oral at bedtime PRN Constipation      Allergies    No Known Allergies    Intolerances          Vital Signs Last 24 Hrs  T(C): 36.7 (22 Mar 2024 09:52), Max: 36.8 (21 Mar 2024 17:30)  T(F): 98 (22 Mar 2024 09:52), Max: 98.3 (21 Mar 2024 17:30)  HR: 94 (22 Mar 2024 12:37) (64 - 94)  BP: 160/86 (22 Mar 2024 12:37) (136/76 - 178/88)  BP(mean): 114 (22 Mar 2024 12:37) (99 - 122)  RR: 17 (21 Mar 2024 23:25) (17 - 18)  SpO2: 94% (21 Mar 2024 23:25) (91% - 94%)    Parameters below as of 22 Mar 2024 12:37  Patient On (Oxygen Delivery Method): room air      CAPILLARY BLOOD GLUCOSE            Physical Exam:  (earlier today)  Daily     Daily   General:  comfortable-appearing in NAD  HEENT:  MMM  CV:  RRR  Lungs:  CTA B/L  Abdomen:  soft NT ND  Extremities:  no edema B/L LE  Skin:  WWP  Neuro:  AAOx3, no dysarthria    LABS:                        13.9   9.28  )-----------( 359      ( 21 Mar 2024 05:30 )             41.0     03-21    135  |  105  |  21  ----------------------------<  108<H>  4.2   |  21<L>  |  0.81    Ca    9.9      21 Mar 2024 05:30  Phos  4.1     03-21  Mg     2.3     03-21        Urinalysis Basic - ( 21 Mar 2024 05:30 )    Color: x / Appearance: x / SG: x / pH: x  Gluc: 108 mg/dL / Ketone: x  / Bili: x / Urobili: x   Blood: x / Protein: x / Nitrite: x   Leuk Esterase: x / RBC: x / WBC x   Sq Epi: x / Non Sq Epi: x / Bacteria: x

## 2024-03-22 NOTE — DISCHARGE NOTE NURSING/CASE MANAGEMENT/SOCIAL WORK - PATIENT PORTAL LINK FT
You can access the FollowMyHealth Patient Portal offered by [facility] by registering at the following website: [email or web address]. By joining [employer]’s FollowMyHealth portal, you will also be able to view your health information using other applications (apps) compatible with our system.

## 2024-03-22 NOTE — DISCHARGE NOTE PROVIDER - NSDCMRMEDTOKEN_GEN_ALL_CORE_FT
losartan - unknown dose: daily  metoprolol - unknown dose: daily   aspirin 81 mg oral delayed release tablet: 1 tab(s) orally once a day  atorvastatin 80 mg oral tablet: 1 tab(s) orally once a day (at bedtime)  lidocaine 4% topical film: Apply topically to affected area once a day  nicotine 14 mg/24 hr transdermal film, extended release: 1 transdermal once a day  pantoprazole 40 mg oral delayed release tablet: 1 tab(s) orally once a day (before a meal)  senna leaf extract oral tablet: 2 tab(s) orally once a day (at bedtime) As needed Constipation

## 2024-03-22 NOTE — DISCHARGE NOTE PROVIDER - HOSPITAL COURSE
Hospital course:  68y Female with PMH tobacco use disorder, HTN, COPD, DM, angina presenting to ED for SOB and L sided weakness/numbness. LKW 1pm on 3/18 when sx started. NIHSS 8. L NLFF, LUE 3/5, LLE 2/5, LUE/LLE numbness. CTH no acute intracranial hemorrhage, mass effect or large demarcated territorial infarction. Punctate linear density in the right cerebellar hemisphere likely representing calcification. Intracranial CTA: No large vessel occlusion. Supraclinoid left ICA aneurysm versus infundibulum. Extracranial CTA: Moderate stenosis the proximal bilateral internal carotid artery secondary to mixed calcified and noncalcified plaque. Moderate stenosis the proximal left subclavian artery. 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 5 mm right upper lobe pulmonary nodule. Received TNK at  3/18 at 1656. Patient admitted to NSICU for post-tenecteplase monitoring. TTE unremarkable. 24hr CTH neg for bleed. Refusing SARI. MRI brain without acute infarction. 8 mm nonspecific focus of FLAIR prolongation right cerebellar hemisphere, situated in the region of calcification on CT, of uncertain etiology and significance. Pending psych consult for depressive thoughts, can follow up outpatient.     During this hospital course, patient had a (ischemic/hemorrhagic) stroke located in (left/right.....) as seen on (MRI/CT).   The stroke etiology is likely secondary to:  []atrial fibrillation  []small vessel disease from atherosclerotic risk factors  []other:  []etiology workup still in progress    Patient had the following workup done in house:  CT Head:   MR Head Non Contrast:  CT Angio Head:  CT Angio Neck:  []echo  []labs  []other    Physical exam at discharge:  NIHSS    New medications on discharge:  Labs to be followed up:  Imaging to be done as outpatient:  Further outpatient workup:   Hospital course:  68y Female with PMH tobacco use disorder, HTN, COPD, DM, angina presenting to ED for SOB and L sided weakness/numbness. LKW 1pm on 3/18 when sx started. NIHSS 8. L NLFF, LUE 3/5, LLE 2/5, LUE/LLE numbness. CTH no acute intracranial hemorrhage, mass effect or large demarcated territorial infarction. Punctate linear density in the right cerebellar hemisphere likely representing calcification.  Received TNK at  3/18 at 1656. Patient admitted to NSICU for post-tenecteplase monitoring. TTE unremarkable. 24hr CTH neg for bleed. MRI brain without acute infarction. 8 mm nonspecific focus of FLAIR prolongation right cerebellar hemisphere, situated in the region of calcification on CT, of uncertain etiology and significance. Pending psych consult for depressive thoughts, can follow up outpatient.     During this hospital course, patient possibly had a stroke aborted by Tenecteplase    Patient had the following workup done in house:  CT Head No Cont 03.19.24  IMPRESSION:  No acute intracranial findings.    MR Head No Cont 03.20.24  1. No evidence of acute infarct.  2. 8 mm nonspecific focus of FLAIR prolongation right cerebellar   hemisphere, situated in the region of calcification on CT, of uncertain   etiologyand significance. Recommend complete MRI of the brain, WITHOUT   and WITH intravenous contrast for more complete characterization,   provided there are no medical contraindications.    [x] Intracranial CTA: No large vessel occlusion. Supraclinoid left ICA aneurysm versus infundibulum.   [x] Extracranial CTA: Moderate stenosis the proximal bilateral internal carotid artery secondary to mixed calcified and noncalcified plaque. Moderate stenosis the proximal left subclavian artery. 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 5 mm right upper lobe pulmonary nodule.  [x] Echocardiogram w/ Bubble and Doppler 3.19.24  CONCLUSIONS:   1. Normal left and right ventricular size and systolic function.   2. Injection of agitated saline via a peripheral vein reveals no   evidence of a right-to-left shunt.   3. No significant valvular disease.   4. No evidence of pulmonary hypertension, pulmonary artery systolic   pressure is 22mmHg.   5. No pericardial effusion.   6. No prior echo is available for comparison.    [x]labs A1c 6.5,   []other    Physical exam at discharge:  -Mental status: Awake, alert, oriented to person, place, and time. Speech is fluent with intact naming, repetition, and comprehension, hoarse voice but no dysarthria. Recent and remote memory intact. Follows commands. Attention/concentration intact.    -Cranial nerves:   II: Visual fields are full to confrontation.  III, IV, VI: Extraocular movements are intact without nystagmus. Pupils equally round and reactive to light. Bilateral cataract surgery.  V:  Facial sensation V1-V3 equal and intact   VII: no facial droop  VIII: Hearing is bilaterally intact to finger rub  IX, X: Uvula is midline and soft palate rises symmetrically  XI: Head turning and shoulder shrug are intact.  XII: Tongue protrudes midline  Motor: Normal bulk and tone. R side 5/5. Left side 4+/5  Sensation: dec sensation LUE/LLE. No neglect or extinction on double simultaneous testing.  Coordination: no dysmetria  Gait: deferred  NIHSS 1    New medications on discharge: aspirin

## 2024-03-22 NOTE — PROGRESS NOTE ADULT - TIME BILLING
direct patient encounter  reviewed labs and images  documentation  d/w Stroke ACP
direct patient encounter  reviewed labs and images  documentation  d/w Stroke ACP

## 2024-03-22 NOTE — DISCHARGE NOTE PROVIDER - NSDCQMMRS_NEU_ALL_CORE
Render Post-Care Instructions In Note?: no
Duration Of Freeze Thaw-Cycle (Seconds): 0
Show Aperture Variable?: Yes
Consent: The patient's consent was obtained including but not limited to risks of crusting, scabbing, blistering, scarring, darker or lighter pigmentary change, recurrence, incomplete removal and infection.
Detail Level: Detailed
Post-Care Instructions: I reviewed with the patient in detail post-care instructions. Patient is to wear sunprotection, and avoid picking at any of the treated lesions. Pt may apply Vaseline to crusted or scabbing areas.
3 - Moderate disability. Requires some help, but able to walk unassisted.

## 2024-03-22 NOTE — DISCHARGE NOTE PROVIDER - NSDCCPCAREPLAN_GEN_ALL_CORE_FT
PRINCIPAL DISCHARGE DIAGNOSIS  Diagnosis: Stroke  Assessment and Plan of Treatment: During this hospital admission, you likely had an ischemic stroke. During an ischemic stroke, blood stops flowing to part of your brain because of a blockage in the blood vessel. This can damage areas in the brain that control other parts of the body.  Please take your aspirin for blood thinning and Atorvastatin for cholesterol medication/blood vessel protection as prescribed to prevent further strokes. Do not skip doses and do not run low on your medication. If you run low on your medication, please contact your doctor.  You will follow up outpatient with the stroke Nurse Practitioner  Doing your regular tasks may be difficult after you've had a stroke, but you can learn new ways to manage your daily activities. In fact, doing daily activities may help you to regain muscle strength. Be patient, give yourself time to adjust, and appreciate the progress you make. For example, when showering or bathing, test the water temperature with a hand or foot that was not affected by the stroke, use grab bars, a shower seat, a hand-held showerhead, etc. It is normal to feel fatigue after a stroke, while some days may be worse than others, you will continue to improve.  Call 911 right away if you have any of the following symptoms of another stroke:  B: Balance: Sudden: Dizziness, loss of balance, or a sense of falling, difficulty with coordinating movement  E: Eyes: Sudden double vision or trouble seeing in one or both eyes  F: Face: Sudden uneven face  A: Arms (Legs): Sudden weakness, tingling, or loss of feeling on one side of your face or body  S: Speech: Sudden trouble talking or slurred speech, sudden difficulty understanding others  T: Time: Please call 911 right away and go to the emergency room  •Sudden, severe headache  •Blackouts or seizures

## 2024-03-28 DIAGNOSIS — E11.9 TYPE 2 DIABETES MELLITUS WITHOUT COMPLICATIONS: ICD-10-CM

## 2024-03-28 DIAGNOSIS — I10 ESSENTIAL (PRIMARY) HYPERTENSION: ICD-10-CM

## 2024-03-28 DIAGNOSIS — J44.9 CHRONIC OBSTRUCTIVE PULMONARY DISEASE, UNSPECIFIED: ICD-10-CM

## 2024-03-28 DIAGNOSIS — F17.210 NICOTINE DEPENDENCE, CIGARETTES, UNCOMPLICATED: ICD-10-CM

## 2024-03-28 DIAGNOSIS — R29.708 NIHSS SCORE 8: ICD-10-CM

## 2024-03-28 DIAGNOSIS — E78.5 HYPERLIPIDEMIA, UNSPECIFIED: ICD-10-CM

## 2024-03-28 DIAGNOSIS — Z59.01 SHELTERED HOMELESSNESS: ICD-10-CM

## 2024-03-28 DIAGNOSIS — Z79.1 LONG TERM (CURRENT) USE OF NON-STEROIDAL ANTI-INFLAMMATORIES (NSAID): ICD-10-CM

## 2024-03-28 DIAGNOSIS — R47.1 DYSARTHRIA AND ANARTHRIA: ICD-10-CM

## 2024-03-28 DIAGNOSIS — F29 UNSPECIFIED PSYCHOSIS NOT DUE TO A SUBSTANCE OR KNOWN PHYSIOLOGICAL CONDITION: ICD-10-CM

## 2024-03-28 DIAGNOSIS — G47.00 INSOMNIA, UNSPECIFIED: ICD-10-CM

## 2024-03-28 DIAGNOSIS — Z11.52 ENCOUNTER FOR SCREENING FOR COVID-19: ICD-10-CM

## 2024-03-28 DIAGNOSIS — Z41.8 ENCOUNTER FOR OTHER PROCEDURES FOR PURPOSES OTHER THAN REMEDYING HEALTH STATE: ICD-10-CM

## 2024-03-28 DIAGNOSIS — I65.23 OCCLUSION AND STENOSIS OF BILATERAL CAROTID ARTERIES: ICD-10-CM

## 2024-03-28 DIAGNOSIS — R13.10 DYSPHAGIA, UNSPECIFIED: ICD-10-CM

## 2024-03-28 DIAGNOSIS — I63.9 CEREBRAL INFARCTION, UNSPECIFIED: ICD-10-CM

## 2024-03-28 DIAGNOSIS — R91.1 SOLITARY PULMONARY NODULE: ICD-10-CM

## 2024-03-28 DIAGNOSIS — D72.829 ELEVATED WHITE BLOOD CELL COUNT, UNSPECIFIED: ICD-10-CM

## 2024-03-28 DIAGNOSIS — I65.02 OCCLUSION AND STENOSIS OF LEFT VERTEBRAL ARTERY: ICD-10-CM

## 2024-03-28 DIAGNOSIS — F41.9 ANXIETY DISORDER, UNSPECIFIED: ICD-10-CM

## 2024-03-28 DIAGNOSIS — G81.94 HEMIPLEGIA, UNSPECIFIED AFFECTING LEFT NONDOMINANT SIDE: ICD-10-CM

## 2024-03-28 SDOH — ECONOMIC STABILITY - HOUSING INSECURITY: SHELTERED HOMELESSNESS: Z59.01

## 2024-03-30 ENCOUNTER — EMERGENCY (EMERGENCY)
Facility: HOSPITAL | Age: 69
LOS: 1 days | Discharge: HOME | End: 2024-03-30
Attending: EMERGENCY MEDICINE | Admitting: EMERGENCY MEDICINE
Payer: MEDICARE

## 2024-03-30 VITALS
TEMPERATURE: 98 F | RESPIRATION RATE: 18 BRPM | SYSTOLIC BLOOD PRESSURE: 158 MMHG | OXYGEN SATURATION: 96 % | HEART RATE: 88 BPM | DIASTOLIC BLOOD PRESSURE: 83 MMHG

## 2024-03-30 VITALS
OXYGEN SATURATION: 97 % | RESPIRATION RATE: 18 BRPM | HEIGHT: 57 IN | DIASTOLIC BLOOD PRESSURE: 87 MMHG | SYSTOLIC BLOOD PRESSURE: 153 MMHG | HEART RATE: 80 BPM | TEMPERATURE: 98 F

## 2024-03-30 DIAGNOSIS — R51.9 HEADACHE, UNSPECIFIED: ICD-10-CM

## 2024-03-30 DIAGNOSIS — J44.9 CHRONIC OBSTRUCTIVE PULMONARY DISEASE, UNSPECIFIED: ICD-10-CM

## 2024-03-30 DIAGNOSIS — R06.02 SHORTNESS OF BREATH: ICD-10-CM

## 2024-03-30 DIAGNOSIS — I10 ESSENTIAL (PRIMARY) HYPERTENSION: ICD-10-CM

## 2024-03-30 DIAGNOSIS — F17.200 NICOTINE DEPENDENCE, UNSPECIFIED, UNCOMPLICATED: ICD-10-CM

## 2024-03-30 DIAGNOSIS — E11.9 TYPE 2 DIABETES MELLITUS WITHOUT COMPLICATIONS: ICD-10-CM

## 2024-03-30 DIAGNOSIS — Z20.822 CONTACT WITH AND (SUSPECTED) EXPOSURE TO COVID-19: ICD-10-CM

## 2024-03-30 DIAGNOSIS — R00.2 PALPITATIONS: ICD-10-CM

## 2024-03-30 DIAGNOSIS — H53.8 OTHER VISUAL DISTURBANCES: ICD-10-CM

## 2024-03-30 DIAGNOSIS — R05.9 COUGH, UNSPECIFIED: ICD-10-CM

## 2024-03-30 DIAGNOSIS — G81.94 HEMIPLEGIA, UNSPECIFIED AFFECTING LEFT NONDOMINANT SIDE: ICD-10-CM

## 2024-03-30 LAB
ALBUMIN SERPL ELPH-MCNC: 4.4 G/DL — SIGNIFICANT CHANGE UP (ref 3.3–5)
ALP SERPL-CCNC: 116 U/L — SIGNIFICANT CHANGE UP (ref 40–120)
ALT FLD-CCNC: 24 U/L — SIGNIFICANT CHANGE UP (ref 10–45)
ANION GAP SERPL CALC-SCNC: 9 MMOL/L — SIGNIFICANT CHANGE UP (ref 5–17)
APPEARANCE UR: CLEAR — SIGNIFICANT CHANGE UP
APTT BLD: 27.1 SEC — SIGNIFICANT CHANGE UP (ref 24.5–35.6)
AST SERPL-CCNC: 19 U/L — SIGNIFICANT CHANGE UP (ref 10–40)
BASOPHILS # BLD AUTO: 0.05 K/UL — SIGNIFICANT CHANGE UP (ref 0–0.2)
BASOPHILS NFR BLD AUTO: 0.4 % — SIGNIFICANT CHANGE UP (ref 0–2)
BILIRUB SERPL-MCNC: 0.3 MG/DL — SIGNIFICANT CHANGE UP (ref 0.2–1.2)
BILIRUB UR-MCNC: NEGATIVE — SIGNIFICANT CHANGE UP
BUN SERPL-MCNC: 17 MG/DL — SIGNIFICANT CHANGE UP (ref 7–23)
CALCIUM SERPL-MCNC: 10.8 MG/DL — HIGH (ref 8.4–10.5)
CHLORIDE SERPL-SCNC: 105 MMOL/L — SIGNIFICANT CHANGE UP (ref 96–108)
CO2 SERPL-SCNC: 21 MMOL/L — LOW (ref 22–31)
COLOR SPEC: YELLOW — SIGNIFICANT CHANGE UP
CREAT SERPL-MCNC: 0.67 MG/DL — SIGNIFICANT CHANGE UP (ref 0.5–1.3)
DIFF PNL FLD: NEGATIVE — SIGNIFICANT CHANGE UP
EGFR: 96 ML/MIN/1.73M2 — SIGNIFICANT CHANGE UP
EOSINOPHIL # BLD AUTO: 0.24 K/UL — SIGNIFICANT CHANGE UP (ref 0–0.5)
EOSINOPHIL NFR BLD AUTO: 2 % — SIGNIFICANT CHANGE UP (ref 0–6)
FLUAV AG NPH QL: SIGNIFICANT CHANGE UP
FLUBV AG NPH QL: SIGNIFICANT CHANGE UP
GLUCOSE SERPL-MCNC: 127 MG/DL — HIGH (ref 70–99)
GLUCOSE UR QL: NEGATIVE MG/DL — SIGNIFICANT CHANGE UP
HCT VFR BLD CALC: 43.9 % — SIGNIFICANT CHANGE UP (ref 34.5–45)
HGB BLD-MCNC: 14.8 G/DL — SIGNIFICANT CHANGE UP (ref 11.5–15.5)
IMM GRANULOCYTES NFR BLD AUTO: 0.3 % — SIGNIFICANT CHANGE UP (ref 0–0.9)
INR BLD: 0.88 — SIGNIFICANT CHANGE UP (ref 0.85–1.18)
KETONES UR-MCNC: NEGATIVE MG/DL — SIGNIFICANT CHANGE UP
LEUKOCYTE ESTERASE UR-ACNC: NEGATIVE — SIGNIFICANT CHANGE UP
LYMPHOCYTES # BLD AUTO: 2.81 K/UL — SIGNIFICANT CHANGE UP (ref 1–3.3)
LYMPHOCYTES # BLD AUTO: 23.9 % — SIGNIFICANT CHANGE UP (ref 13–44)
MCHC RBC-ENTMCNC: 31.8 PG — SIGNIFICANT CHANGE UP (ref 27–34)
MCHC RBC-ENTMCNC: 33.7 GM/DL — SIGNIFICANT CHANGE UP (ref 32–36)
MCV RBC AUTO: 94.2 FL — SIGNIFICANT CHANGE UP (ref 80–100)
MONOCYTES # BLD AUTO: 0.71 K/UL — SIGNIFICANT CHANGE UP (ref 0–0.9)
MONOCYTES NFR BLD AUTO: 6 % — SIGNIFICANT CHANGE UP (ref 2–14)
NEUTROPHILS # BLD AUTO: 7.9 K/UL — HIGH (ref 1.8–7.4)
NEUTROPHILS NFR BLD AUTO: 67.4 % — SIGNIFICANT CHANGE UP (ref 43–77)
NITRITE UR-MCNC: NEGATIVE — SIGNIFICANT CHANGE UP
NRBC # BLD: 0 /100 WBCS — SIGNIFICANT CHANGE UP (ref 0–0)
PH UR: 7 — SIGNIFICANT CHANGE UP (ref 5–8)
PLATELET # BLD AUTO: 275 K/UL — SIGNIFICANT CHANGE UP (ref 150–400)
POTASSIUM SERPL-MCNC: 4.7 MMOL/L — SIGNIFICANT CHANGE UP (ref 3.5–5.3)
POTASSIUM SERPL-SCNC: 4.7 MMOL/L — SIGNIFICANT CHANGE UP (ref 3.5–5.3)
PROT SERPL-MCNC: 7.4 G/DL — SIGNIFICANT CHANGE UP (ref 6–8.3)
PROT UR-MCNC: NEGATIVE MG/DL — SIGNIFICANT CHANGE UP
PROTHROM AB SERPL-ACNC: 10.1 SEC — SIGNIFICANT CHANGE UP (ref 9.5–13)
RBC # BLD: 4.66 M/UL — SIGNIFICANT CHANGE UP (ref 3.8–5.2)
RBC # FLD: 11.9 % — SIGNIFICANT CHANGE UP (ref 10.3–14.5)
RSV RNA NPH QL NAA+NON-PROBE: SIGNIFICANT CHANGE UP
SARS-COV-2 RNA SPEC QL NAA+PROBE: SIGNIFICANT CHANGE UP
SODIUM SERPL-SCNC: 135 MMOL/L — SIGNIFICANT CHANGE UP (ref 135–145)
SP GR SPEC: >1.03 — HIGH (ref 1–1.03)
TROPONIN T, HIGH SENSITIVITY RESULT: 8 NG/L — SIGNIFICANT CHANGE UP (ref 0–51)
UROBILINOGEN FLD QL: 0.2 MG/DL — SIGNIFICANT CHANGE UP (ref 0.2–1)
WBC # BLD: 11.74 K/UL — HIGH (ref 3.8–10.5)
WBC # FLD AUTO: 11.74 K/UL — HIGH (ref 3.8–10.5)

## 2024-03-30 PROCEDURE — 85730 THROMBOPLASTIN TIME PARTIAL: CPT

## 2024-03-30 PROCEDURE — 70450 CT HEAD/BRAIN W/O DYE: CPT | Mod: MC

## 2024-03-30 PROCEDURE — 0042T: CPT | Mod: MC

## 2024-03-30 PROCEDURE — 81003 URINALYSIS AUTO W/O SCOPE: CPT

## 2024-03-30 PROCEDURE — 70496 CT ANGIOGRAPHY HEAD: CPT | Mod: MC

## 2024-03-30 PROCEDURE — 70498 CT ANGIOGRAPHY NECK: CPT | Mod: MC

## 2024-03-30 PROCEDURE — 85025 COMPLETE CBC W/AUTO DIFF WBC: CPT

## 2024-03-30 PROCEDURE — 87637 SARSCOV2&INF A&B&RSV AMP PRB: CPT

## 2024-03-30 PROCEDURE — 99291 CRITICAL CARE FIRST HOUR: CPT | Mod: 25

## 2024-03-30 PROCEDURE — 70496 CT ANGIOGRAPHY HEAD: CPT | Mod: 26,MC

## 2024-03-30 PROCEDURE — 93005 ELECTROCARDIOGRAM TRACING: CPT

## 2024-03-30 PROCEDURE — 85610 PROTHROMBIN TIME: CPT

## 2024-03-30 PROCEDURE — 80053 COMPREHEN METABOLIC PANEL: CPT

## 2024-03-30 PROCEDURE — 82962 GLUCOSE BLOOD TEST: CPT

## 2024-03-30 PROCEDURE — 93010 ELECTROCARDIOGRAM REPORT: CPT

## 2024-03-30 PROCEDURE — 36415 COLL VENOUS BLD VENIPUNCTURE: CPT

## 2024-03-30 PROCEDURE — 99291 CRITICAL CARE FIRST HOUR: CPT

## 2024-03-30 PROCEDURE — 84484 ASSAY OF TROPONIN QUANT: CPT

## 2024-03-30 PROCEDURE — 70498 CT ANGIOGRAPHY NECK: CPT | Mod: 26,MC

## 2024-03-30 PROCEDURE — 70450 CT HEAD/BRAIN W/O DYE: CPT | Mod: 26,MC,XU

## 2024-03-30 NOTE — ED ADULT NURSE NOTE - NSFALLHARMRISKINTERV_ED_ALL_ED

## 2024-03-30 NOTE — CHART NOTE - NSCHARTNOTEFT_GEN_A_CORE
Patient presented to ED from [facility] after walking out of their facility and calling EMS. SW spoke with Gina [phone] of [facility] admissions. As per Gina, patient signed herself out of [facility] AMA due to not being permitted to smoke cigarettes. Gina confirmed that patient can be accepted back to the facility today, and that a physical copy of the discharge summary must go with patient.    Patient has been medically cleared for d/c from ED. Patient is to return to [facility]. No further SW needs.    [place] [id number] [place], [place] [address]  [phone]

## 2024-03-30 NOTE — CONSULT NOTE ADULT - ASSESSMENT
68y Female with PMH tobacco use disorder, HTN, COPD, DM, angina, presents from rehab for worsening left UE and LE weakness. Patient c/o worsening weakness and sensation in her left UE and LE.   68y Female with PMH tobacco use disorder, HTN, COPD, DM, angina, presents from rehab for worsening left UE and LE weakness. Patient c/o worsening weakness and sensation in her left UE and LE. She also has significant lower back pain and a positive SLRT which warrants imaging of cervical and lumbar spine.   68y Female with PMH tobacco use disorder, HTN, COPD, DM, angina, presents from rehab for worsening left UE and LE weakness. Patient c/o worsening weakness and sensation in her left UE and LE. Given patient has good strength in the right UE and LE along with preserved reflexes the presentation is less concerning a cord pathology.    Recs:  - outpatient neuro follow up   - call for any change in neuro status

## 2024-03-30 NOTE — ED ADULT TRIAGE NOTE - CHIEF COMPLAINT QUOTE
Pt c/o h/a since "yesterday afternoon," endorses "had stroke last week and got TNK." Pt endorses unsteady gait, left arm weakness and left hand numbness "from the stroke but it's also worse."

## 2024-03-30 NOTE — ED PROVIDER NOTE - PROGRESS NOTE DETAILS
Labs wnl, ct w/o acute process or change from recent.  Pt discussed w stroke attdg, Dr Wells - he does not feel pt has had acute stroke and has some concerns about poss malingering given pt reported having sx yest, feeling ignored and also being unable to smoke so she left her rehab facility.  Unable to reach someone at [facility] to discuss recent events at their facility.  Stroke suggested neuro consult for pt's L sided weakness, ? peripheral issue.  Neuro consulted. CM able to reach staff at [facility] pt had asked staff to buy her cigarettes, they refused, pt got upset, signed out ama and then called 911 to come to er.  Pt had reported to me that her sx started yest, she told staff, felt ignored, and then today, they refused to let her smoke so she left but noted cont, worsened sx from what she'd been experiencing. Neuro eval pt and cleared for outpt fu.  Will dc.  CM state pt can go back to her rehab if she wants to per [facility]. Neuro eval pt and cleared for outpt fu.  Will dc.  CM state pt can go back to her rehab if she wants to per [facility].  BP high; not on meds for bp during last admit.  Pt counseled to keep a log of his/her blood pressures and share with his/her pmd as well as counseled to return for chest pain, difficulty breathing, palpitations, severe headache, change in behavior, change in vision/speech/gait, numbness or weakness in extremities, any other concerns.

## 2024-03-30 NOTE — ED ADULT TRIAGE NOTE - BEFAST BALANCE
----- Message from Marguerite Keene PA-C sent at 8/6/2021  3:41 PM EDT -----  Missing patient HSV, was it not drawn?
According to Rajeev Padron from [facility], the HSV was missed they are going to added it on  They also ran an HIV test that will be sent after resulting  If there is a problem they will let us know via fax 
Yes

## 2024-03-30 NOTE — ED ADULT NURSE REASSESSMENT NOTE - NS ED NURSE REASSESS COMMENT FT1
Pt denies any pain /discomfort, requesting meal, meal provided. Educated about discharge plan, pt verbalized understanding.
Pt called RN to bedside, asking about discharge plan, pt states "I do not want to be discharge to the streets" states " I have no place to go". Pt informed on plan of care, Neuro consult pending.

## 2024-03-30 NOTE — ED PROVIDER NOTE - NSFOLLOWUPINSTRUCTIONS_ED_ALL_ED_FT
L sided weakness    Please follow up with a pmd and neurology for further evaluation of your symptoms.     You may return to your rehab facility.    You may call our referrals coordinator at [phone] Monday to Friday 11am-7pm for assistance with making an appointment L sided weakness    Please follow up with a pmd and neurology for further evaluation of your symptoms.     You may return to your rehab facility.    You may call our referrals coordinator at [phone] Monday to Friday 11am-7pm for assistance with making an appointment    Your blood pressure reading was high today; keep a log of your blood pressures and share with your pmd to decide if you need to start medication.  Return for chest pain, difficulty breathing, palpitations, severe headache, change in behavior, change in vision/speech/gait, numbness or weakness in extremities,  any other concerns - these could be due to poorly controlled, very high blood pressure.

## 2024-03-30 NOTE — STROKE CODE NOTE - NIH STROKE SCALE: 11. EXTINCTION AND INATTENTION, QM
(0) No abnormality Referred To Otolaryngology For Closure Text (Leave Blank If You Do Not Want): After obtaining clear surgical margins the patient was sent to otolaryngology for surgical repair.  The patient understands they will receive post-surgical care and follow-up from the Otolaryngologyist's and referring physician's office, and may follow up in our surgical clinic as needed.

## 2024-03-30 NOTE — CONSULT NOTE ADULT - ASSESSMENT
69yo RHF current tobacco user w/ HTN DM, COPD presenting to the ED for worsening Left Hemiparesis > 18hrs at [facility]. ependently walk out the facility to alert 911 to be transported to the hospital. mRS 3, NIHSS 2.  Per chart  67yo RHF current tobacco user w/ HTN DM, COPD presenting to the ED for worsening Left Hemiparesis > 18hrs at [facility]. ependently walk out the facility to alert 911 to be transported to the hospital. mRS 3, NIHSS 2.  Per chart recently admitted for similar complaint s/p tNK, s/p Stroke negative MRI likely tNK abortive stroke. Stable findings on CTH/CTACTP studies. Case d/w Neurointensivist on call. Serum studies neg for infection or electrolyte abnormality which would cause recrudescence stroke symptoms.     Recommendation  No further stroke w/u   MRI C-spine w/o can be done as an outpatient study  Outpatient f/u w/ stroke clinic or Neurology  67yo RHF current tobacco user w/ HTN DM, COPD presenting to the ED for worsening Left Hemiparesis > 18hrs at [facility]. ependently walk out the facility to alert 911 to be transported to the hospital. mRS 3, NIHSS 2.  Per chart recently admitted for similar complaint s/p tNK, s/p Stroke negative MRI likely tNK abortive stroke. Stable findings on CTH/CTACTP studies. Case d/w Neurointensivist on call. Serum studies neg for infection or electrolyte abnormality which would cause recrudescence stroke symptoms. Unlikely stroke. Pt AMA from her rehab and is in need of placement.     Recommendation  No further stroke w/u   MRI C-spine w/o can be done as an outpatient study  Outpatient f/u w/ stroke clinic or Neurology

## 2024-03-30 NOTE — ED PROVIDER NOTE - NSICDXPASTMEDICALHX_GEN_ALL_CORE_FT
PAST MEDICAL HISTORY:  Back pain     COPD without exacerbation     CVA (cerebrovascular accident)     Diabetes     History of angina     HTN (hypertension)

## 2024-03-30 NOTE — ED PROVIDER NOTE - PATIENT PORTAL LINK FT
You can access the FollowMyHealth Patient Portal offered by [facility] by registering at the following website: [email or web address]. By joining [employer]’s FollowMyHealth portal, you will also be able to view your health information using other applications (apps) compatible with our system.

## 2024-03-30 NOTE — CONSULT NOTE ADULT - SUBJECTIVE AND OBJECTIVE BOX
Neurology Consult    Patient is a 68y old  Female who presents with a chief complaint of worsening left sided weakness from baseline.     HPI:  Ms. Lew reported that 03/29/24 Friday afternoon she experienced a right sided pulsating head pain and overall weakness which had resolved before bed. She stated that she received medical attention [facility] however, they did not transfer her to the hospital. Today she decided to walk out the facility and call 911 after her symptoms returned. Currently she does not have any head pain but, endorsed weakness. She denied any sensational changes, symptoms of her right side, changes to her vision and or speech.     Per chart review Ms. Lew was discharge to [facility] w/ NIHSS 1 for left extremity hypoesthesia. She was admitted 03/18 for similar complains of Left Hemiparesis and HA s/p tNK. MRI suggestive of tNK abortive stroke.        PAST MEDICAL & SURGICAL HISTORY:  History of angina  Back pain  CVA (cerebrovascular accident)  HTN (hypertension)  COPD without exacerbation  Diabetes      FAMILY HISTORY:      Social History: (-) x 3    Allergies    No Known Allergies    Intolerances        MEDICATIONS  (STANDING):    MEDICATIONS  (PRN):      Review of systems:    Constitutional: as per HPI  Eyes: No eye pain or discharge  ENMT:  No difficulty hearing; No sinus or throat pain  Neck: No pain or stiffness  Respiratory: No cough, wheezing, chills or hemoptysis  Cardiovascular: No chest pain, palpitations, shortness of breath, dyspnea on exertion  Gastrointestinal: No abdominal pain, nausea, vomiting or hematemesis; No diarrhea or constipation.   Genitourinary: No dysuria, frequency, hematuria or incontinence  Neurological: As per HPI  Skin: No rashes or lesions   Endocrine: No heat or cold intolerance; No hair loss  Musculoskeletal: hip pain      Vital Signs Last 24 Hrs  T(C): 36.4 (30 Mar 2024 12:36), Max: 36.6 (30 Mar 2024 10:57)  T(F): 97.5 (30 Mar 2024 12:36), Max: 97.9 (30 Mar 2024 10:57)  HR: 72 (30 Mar 2024 13:43) (71 - 80)  BP: 186/82 (30 Mar 2024 13:43) (153/87 - 186/82)  RR: 18 (30 Mar 2024 13:43) (18 - 18)  SpO2: 99% (30 Mar 2024 13:43) (97% - 99%)    Parameters below as of 30 Mar 2024 13:43  Patient On (Oxygen Delivery Method): room air        Examination:  General:  Appearance is consistent with chronologic age.  No abnormal facies.  Gross skin survey within normal limits.    Cognitive/Language:  The patient is oriented to person, place, time and date.  Recent and remote memory intact.  Nondysarthric.    Eyes: intact VA, VFF.  EOMI w/o nystagmus, skew or reported double vision.  PERRL.  No ptosis/weakness of eyelid closure.    Face:  Facial sensation normal V1 - 3, no facial asymmetry.    Ears/Nose/Throat:  Hearing grossly intact b/l.  Palate elevates midline.  Tongue and uvula midline.   Motor examination:   Normal tone, bulk and range of motion.  No tenderness, twitching, tremors or involuntary movements.  Formal Muscle Strength Testing: (MRC grade R/L) 5/5 UE; 5/5 LE.  No observable drift.  Reflexes:   2+ b/l pectoralis, biceps, triceps, brachioradialis, patella and Achilles.  Plantar response downgoing b/l.  Jaw jerk, Vianey, clonus absent.  Sensory examination:   Intact to light touch and pinprick, pain, temperature and proprioception and vibration in all extremities.  Cerebellum:   FTN/HKS intact with normal JORGE ALBERTO in all limbs.  No dysmetria or dysdiadokinesia.  Gait narrow based and normal.    Respiratory:  no audible wheezing or inspiratory stridor.  no use of accessory muscles.   Cardiac: pulse palpable, no audible bruits  Abdomen: supple, no guarding, no TTP    Labs:   CBC Full  -  ( 30 Mar 2024 11:06 )  WBC Count : 11.74 K/uL  RBC Count : 4.66 M/uL  Hemoglobin : 14.8 g/dL  Hematocrit : 43.9 %  Platelet Count - Automated : 275 K/uL  Mean Cell Volume : 94.2 fl  Mean Cell Hemoglobin : 31.8 pg  Mean Cell Hemoglobin Concentration : 33.7 gm/dL  Auto Neutrophil # : 7.90 K/uL  Auto Lymphocyte # : 2.81 K/uL  Auto Monocyte # : 0.71 K/uL  Auto Eosinophil # : 0.24 K/uL  Auto Basophil # : 0.05 K/uL  Auto Neutrophil % : 67.4 %  Auto Lymphocyte % : 23.9 %  Auto Monocyte % : 6.0 %  Auto Eosinophil % : 2.0 %  Auto Basophil % : 0.4 %    03-30    135  |  105  |  17  ----------------------------<  127<H>  4.7   |  21<L>  |  0.67    Ca    10.8<H>      30 Mar 2024 11:06    TPro  7.4  /  Alb  4.4  /  TBili  0.3  /  DBili  x   /  AST  19  /  ALT  24  /  AlkPhos  116  03-30    LIVER FUNCTIONS - ( 30 Mar 2024 11:06 )  Alb: 4.4 g/dL / Pro: 7.4 g/dL / ALK PHOS: 116 U/L / ALT: 24 U/L / AST: 19 U/L / GGT: x           PT/INR - ( 30 Mar 2024 11:06 )   PT: 10.1 sec;   INR: 0.88          PTT - ( 30 Mar 2024 11:06 )  PTT:27.1 sec  Urinalysis Basic - ( 30 Mar 2024 11:55 )    Color: Yellow / Appearance: Clear / SG: >1.030 / pH: x  Gluc: x / Ketone: Negative mg/dL  / Bili: Negative / Urobili: 0.2 mg/dL   Blood: x / Protein: Negative mg/dL / Nitrite: Negative   Leuk Esterase: Negative / RBC: x / WBC x   Sq Epi: x / Non Sq Epi: x / Bacteria: x          Neuroimaging:  NCT:     03-30-24 @ 14:30       Neurology Consult    Patient is a 68y old  Female who presents with a chief complaint of worsening left sided weakness from baseline.     HPI:  Ms. Lew reported that 03/29/24 Friday afternoon she experienced a right sided pulsating head pain and overall weakness which had resolved before bed. She stated that she received medical attention [facility] however, they did not transfer her to the hospital. Today she decided to walk out the facility and call 911 after her symptoms returned. Currently she does not have any head pain but, endorsed weakness. She denied any sensational changes, symptoms of her right side, changes to her vision and or speech.     Per chart review Ms. Lew was discharge to [facility] w/ NIHSS 1 for left extremity hypoesthesia. She was admitted 03/18 for similar complains of Left Hemiparesis and HA s/p tNK. MRI suggestive of tNK abortive stroke.        PAST MEDICAL & SURGICAL HISTORY:  History of angina  Back pain  CVA (cerebrovascular accident)  HTN (hypertension)  COPD without exacerbation  Diabetes      FAMILY HISTORY:      Social History: (-) x 3    Allergies    No Known Allergies    Intolerances        MEDICATIONS  (STANDING):    MEDICATIONS  (PRN):      Review of systems:    Constitutional: as per HPI  Eyes: No eye pain or discharge  ENMT:  No difficulty hearing; No sinus or throat pain  Neck: No pain or stiffness  Respiratory: No cough, wheezing, chills or hemoptysis  Cardiovascular: No chest pain, palpitations, shortness of breath, dyspnea on exertion  Gastrointestinal: No abdominal pain, nausea, vomiting or hematemesis; No diarrhea or constipation.   Genitourinary: No dysuria, frequency, hematuria or incontinence  Neurological: As per HPI  Skin: No rashes or lesions   Endocrine: No heat or cold intolerance; No hair loss  Musculoskeletal: hip pain      Vital Signs Last 24 Hrs  T(C): 36.4 (30 Mar 2024 12:36), Max: 36.6 (30 Mar 2024 10:57)  T(F): 97.5 (30 Mar 2024 12:36), Max: 97.9 (30 Mar 2024 10:57)  HR: 72 (30 Mar 2024 13:43) (71 - 80)  BP: 186/82 (30 Mar 2024 13:43) (153/87 - 186/82)  RR: 18 (30 Mar 2024 13:43) (18 - 18)  SpO2: 99% (30 Mar 2024 13:43) (97% - 99%)    Parameters below as of 30 Mar 2024 13:43  Patient On (Oxygen Delivery Method): room air        Examination:  General:  Appearance is consistent with chronologic age.  No abnormal facies.  Gross skin survey within normal limits.    Cognitive/Language:  The patient is oriented to person, place, time and date.  Recent and remote memory intact.  Nondysarthric.    Eyes: intact VA, VFF.  EOMI w/o nystagmus, skew or reported double vision.  PERRL.  No ptosis/weakness of eyelid closure.    Face:  Facial sensation normal V1 - 3, no facial asymmetry.    Ears/Nose/Throat:  Hearing grossly intact b/l.  Palate elevates midline.  Tongue and uvula midline.   Motor examination:   Normal tone, bulk and range of motion.  No tenderness, twitching, tremors or involuntary movements.  Formal Muscle Strength Testing: (MRC grade R/L) 5/5 UE; 5/5 LE.  No observable drift.  Reflexes:   2+ b/l pectoralis, biceps, triceps, brachioradialis, patella and Achilles.  Plantar response downgoing b/l.  Jaw jerk, Vianey, clonus absent.  Sensory examination:   Intact to light touch and pinprick, pain, temperature and proprioception and vibration in all extremities.  Cerebellum:   FTN/HKS intact with normal JORGE ALBERTO in all limbs.  No dysmetria or dysdiadokinesia.  Gait narrow based and normal.    Respiratory:  no audible wheezing or inspiratory stridor.  no use of accessory muscles.   Cardiac: pulse palpable, no audible bruits  Abdomen: supple, no guarding, no TTP    Labs:   CBC Full  -  ( 30 Mar 2024 11:06 )  WBC Count : 11.74 K/uL  RBC Count : 4.66 M/uL  Hemoglobin : 14.8 g/dL  Hematocrit : 43.9 %  Platelet Count - Automated : 275 K/uL  Mean Cell Volume : 94.2 fl  Mean Cell Hemoglobin : 31.8 pg  Mean Cell Hemoglobin Concentration : 33.7 gm/dL  Auto Neutrophil # : 7.90 K/uL  Auto Lymphocyte # : 2.81 K/uL  Auto Monocyte # : 0.71 K/uL  Auto Eosinophil # : 0.24 K/uL  Auto Basophil # : 0.05 K/uL  Auto Neutrophil % : 67.4 %  Auto Lymphocyte % : 23.9 %  Auto Monocyte % : 6.0 %  Auto Eosinophil % : 2.0 %  Auto Basophil % : 0.4 %    03-30    135  |  105  |  17  ----------------------------<  127<H>  4.7   |  21<L>  |  0.67    Ca    10.8<H>      30 Mar 2024 11:06    TPro  7.4  /  Alb  4.4  /  TBili  0.3  /  DBili  x   /  AST  19  /  ALT  24  /  AlkPhos  116  03-30    LIVER FUNCTIONS - ( 30 Mar 2024 11:06 )  Alb: 4.4 g/dL / Pro: 7.4 g/dL / ALK PHOS: 116 U/L / ALT: 24 U/L / AST: 19 U/L / GGT: x           PT/INR - ( 30 Mar 2024 11:06 )   PT: 10.1 sec;   INR: 0.88          PTT - ( 30 Mar 2024 11:06 )  PTT:27.1 sec      Neuroimaging:  NCT:     03-30-24 @ 14:30       Neurology Consult    Patient is a 68y old  Female who presents with a chief complaint of worsening left sided weakness from baseline.     HPI:  Ms. Lew reported that 03/29/24 Friday afternoon she experienced a right sided pulsating head pain and overall weakness which had resolved before bed. She stated that she received medical attention [facility] however, they did not transfer her to the hospital. Today she decided to walk out the facility and call 911 after her symptoms returned. Currently she does not have any head pain but, endorsed weakness. She denied any sensational changes, symptoms of her right side, changes to her vision and or speech.     Per chart review Ms. Lew was discharge to [facility] w/ NIHSS 1 for left extremity hypoesthesia. She was admitted 03/18 for similar complains of Left Hemiparesis and HA s/p tNK. MRI suggestive of tNK abortive stroke.        PAST MEDICAL & SURGICAL HISTORY:  History of angina  Back pain  CVA (cerebrovascular accident)  HTN (hypertension)  COPD without exacerbation  Diabetes      FAMILY HISTORY:      Social History: (-) x 3    Allergies    No Known Allergies    Intolerances        MEDICATIONS  (STANDING):    MEDICATIONS  (PRN):      Review of systems:    Constitutional: as per HPI  Eyes: No eye pain or discharge  ENMT:  No difficulty hearing; No sinus or throat pain  Neck: No pain or stiffness  Respiratory: No cough, wheezing, chills or hemoptysis  Cardiovascular: No chest pain, palpitations, shortness of breath, dyspnea on exertion  Gastrointestinal: No abdominal pain, nausea, vomiting or hematemesis; No diarrhea or constipation.   Genitourinary: No dysuria, frequency, hematuria or incontinence  Neurological: As per HPI  Skin: No rashes or lesions   Endocrine: No heat or cold intolerance; No hair loss  Musculoskeletal: hip pain      Vital Signs Last 24 Hrs  T(C): 36.4 (30 Mar 2024 12:36), Max: 36.6 (30 Mar 2024 10:57)  T(F): 97.5 (30 Mar 2024 12:36), Max: 97.9 (30 Mar 2024 10:57)  HR: 72 (30 Mar 2024 13:43) (71 - 80)  BP: 186/82 (30 Mar 2024 13:43) (153/87 - 186/82)  RR: 18 (30 Mar 2024 13:43) (18 - 18)  SpO2: 99% (30 Mar 2024 13:43) (97% - 99%)    Parameters below as of 30 Mar 2024 13:43  Patient On (Oxygen Delivery Method): room air        Examination:  General:  Appearance is consistent with chronologic age.  No abnormal facies.  Gross skin survey within normal limits.    Cognitive/Language:  The patient is oriented to person, place, time and date.  Recent and remote memory intact.  Nondysarthric.    Eyes: VFF.  EOMI w/o nystagmus, skew or reported double vision.  PERRL.  No ptosis/weakness of eyelid closure.    Face:  Facial sensation normal V1 - 3, no facial asymmetry.    Ears/Nose/Throat:  Hearing grossly intact b/l.  Palate elevates midline.  Tongue and uvula midline.   Motor examination:   Normal tone, bulk and range of motion.  No tenderness, twitching, tremors or involuntary movements.  Formal Muscle Strength Testing: (MRC grade R/L) 5/4 UE; 5/4 LE.  Reflexes:   2+ b/l biceps, triceps, brachioradialis, patella and Achilles.  Plantar response downgoing b/l.  Neg Vianey, clonus absent.  Sensory examination:   Intact to light touch in all extremities and w/o extinction   Cerebellum:   no dysmetria on FTN.     Gait deferred     Labs:   CBC Full  -  ( 30 Mar 2024 11:06 )  WBC Count : 11.74 K/uL  RBC Count : 4.66 M/uL  Hemoglobin : 14.8 g/dL  Hematocrit : 43.9 %  Platelet Count - Automated : 275 K/uL  Mean Cell Volume : 94.2 fl  Mean Cell Hemoglobin : 31.8 pg  Mean Cell Hemoglobin Concentration : 33.7 gm/dL  Auto Neutrophil # : 7.90 K/uL  Auto Lymphocyte # : 2.81 K/uL  Auto Monocyte # : 0.71 K/uL  Auto Eosinophil # : 0.24 K/uL  Auto Basophil # : 0.05 K/uL  Auto Neutrophil % : 67.4 %  Auto Lymphocyte % : 23.9 %  Auto Monocyte % : 6.0 %  Auto Eosinophil % : 2.0 %  Auto Basophil % : 0.4 %    03-30    135  |  105  |  17  ----------------------------<  127<H>  4.7   |  21<L>  |  0.67    Ca    10.8<H>      30 Mar 2024 11:06    TPro  7.4  /  Alb  4.4  /  TBili  0.3  /  DBili  x   /  AST  19  /  ALT  24  /  AlkPhos  116  03-30    LIVER FUNCTIONS - ( 30 Mar 2024 11:06 )  Alb: 4.4 g/dL / Pro: 7.4 g/dL / ALK PHOS: 116 U/L / ALT: 24 U/L / AST: 19 U/L / GGT: x           PT/INR - ( 30 Mar 2024 11:06 )   PT: 10.1 sec;   INR: 0.88          PTT - ( 30 Mar 2024 11:06 )  PTT:27.1 sec      Neuroimaging:  NCT:     03-30-24 @ 14:30

## 2024-03-30 NOTE — CONSULT NOTE ADULT - SUBJECTIVE AND OBJECTIVE BOX
NEUROLOGY CONSULT    HPI: 68y Female with PMH tobacco use disorder, HTN, COPD, DM, angina, presents from rehab for worsening left UE and LE weakness. Patient was recently admitted to [facility] NICU after receiving TNK for left sided weakness/numbness (admission NIHSS 8). Patient was discharged to [facility] ([facility]) with NIHSS 1 and MRI showing  8 mm nonspecific focus of FLAIR prolongation right cerebellar hemisphere but no stroke. Patient states that last night she c/o right sided headache along with worsening weakness and told the rehab staff about it however did not come to the ED. In the morning, patient asked for a cigarette from the staff and was denied. She then decided to leave ama and called 911 to come to the ED. Patient states that she is homeless and does not have any living family member.  A stroke code was called and NIHSS was 2. She currently denied any headache, N/V, change in vision, speech, fall., LOC.         MEDICATIONS  Home Medications:  aspirin 81 mg oral delayed release tablet: 1 tab(s) orally once a day (30 Mar 2024 14:07)  atorvastatin 80 mg oral tablet: 1 tab(s) orally once a day (at bedtime) (30 Mar 2024 14:07)  lidocaine 4% topical film: Apply topically to affected area once a day (30 Mar 2024 14:07)  nicotine 14 mg/24 hr transdermal film, extended release: 1 transdermal once a day (30 Mar 2024 14:07)  pantoprazole 40 mg oral delayed release tablet: 1 tab(s) orally once a day (before a meal) (30 Mar 2024 14:07)  senna leaf extract oral tablet: 2 tab(s) orally once a day (at bedtime) As needed Constipation (30 Mar 2024 14:07)    MEDICATIONS  (STANDING):    MEDICATIONS  (PRN):      FAMILY HISTORY:    SOCIAL HISTORY: negative for tobacco, alcohol, or ilicit drug use.    Allergies    No Known Allergies    Intolerances        GEN: NAD, pleasant, cooperative    NEURO:   MENTAL STATUS: AAOx3  LANG/SPEECH: Fluent, intact naming, repetition & comprehension  CRANIAL NERVES:  II: Pupils equal and reactive, no RAPD, normal visual field and fundus  III, IV, VI: EOM intact, no gaze preference or deviation  V: normal  VII: no facial asymmetry  VIII: normal hearing to speech  MOTOR: 5/5 RT UE and LE, 2/5 LT UE and LE   REFLEXES: 2/4 throughout, bilateral flexor plantar, babinski absent  SENSORY: dec to touch left UE and LE, vibration and proprioception intact   COORD: Normal finger to nose, no tremor, no dysmetria      LABS:                        14.8   11.74 )-----------( 275      ( 30 Mar 2024 11:06 )             43.9     03-30    135  |  105  |  17  ----------------------------<  127<H>  4.7   |  21<L>  |  0.67    Ca    10.8<H>      30 Mar 2024 11:06    TPro  7.4  /  Alb  4.4  /  TBili  0.3  /  DBili  x   /  AST  19  /  ALT  24  /  AlkPhos  116  03-30    Hemoglobin A1C:   Vitamin B12   PT/INR - ( 30 Mar 2024 11:06 )   PT: 10.1 sec;   INR: 0.88          PTT - ( 30 Mar 2024 11:06 )  PTT:27.1 sec  CAPILLARY BLOOD GLUCOSE  138 (30 Mar 2024 12:02)      POCT Blood Glucose.: 138 mg/dL (30 Mar 2024 10:57)      Urinalysis Basic - ( 30 Mar 2024 11:55 )    Color: Yellow / Appearance: Clear / SG: >1.030 / pH: x  Gluc: x / Ketone: Negative mg/dL  / Bili: Negative / Urobili: 0.2 mg/dL   Blood: x / Protein: Negative mg/dL / Nitrite: Negative   Leuk Esterase: Negative / RBC: x / WBC x   Sq Epi: x / Non Sq Epi: x / Bacteria: x            Microbiology:    Urinalysis with Rflx Culture (collected 30 Mar 2024 11:55)        RADIOLOGY, EKG AND ADDITIONAL TESTS: Reviewed.           NEUROLOGY CONSULT    HPI: 68y Female with PMH tobacco use disorder, HTN, COPD, DM, angina, presents from rehab for worsening left UE and LE weakness. Patient was recently admitted to [facility] NICU after receiving TNK for left sided weakness/numbness (admission NIHSS 8). Patient was discharged to [facility]hab ([facility]) with NIHSS 1 and MRI showing  8 mm nonspecific focus of FLAIR prolongation right cerebellar hemisphere but no stroke. Patient states that last night she c/o right sided headache along with worsening weakness and told the rehab staff about it however did not come to the ED. In the morning, patient asked for a cigarette from the staff and was denied. She then decided to leave ama and called 911 to come to the ED. Patient states that she is homeless and does not have any living family member.  A stroke code was called and NIHSS was 2. 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 She currently denied any headache, N/V, change in vision, speech, fall., LOC.         MEDICATIONS  Home Medications:  aspirin 81 mg oral delayed release tablet: 1 tab(s) orally once a day (30 Mar 2024 14:07)  atorvastatin 80 mg oral tablet: 1 tab(s) orally once a day (at bedtime) (30 Mar 2024 14:07)  lidocaine 4% topical film: Apply topically to affected area once a day (30 Mar 2024 14:07)  nicotine 14 mg/24 hr transdermal film, extended release: 1 transdermal once a day (30 Mar 2024 14:07)  pantoprazole 40 mg oral delayed release tablet: 1 tab(s) orally once a day (before a meal) (30 Mar 2024 14:07)  senna leaf extract oral tablet: 2 tab(s) orally once a day (at bedtime) As needed Constipation (30 Mar 2024 14:07)    MEDICATIONS  (STANDING):    MEDICATIONS  (PRN):      FAMILY HISTORY:    SOCIAL HISTORY: negative for tobacco, alcohol, or ilicit drug use.    Allergies    No Known Allergies    Intolerances        GEN: NAD, pleasant, cooperative    NEURO:   MENTAL STATUS: AAOx3  LANG/SPEECH: Fluent, intact naming, repetition & comprehension  CRANIAL NERVES:  II: Pupils equal and reactive, no RAPD, normal visual field and fundus  III, IV, VI: EOM intact, no gaze preference or deviation  V: normal  VII: no facial asymmetry  VIII: normal hearing to speech  MOTOR: 5/5 RT UE and LE, 2/5 LT UE and LE, SLRT positive left leg   REFLEXES: 2/4 throughout, bilateral flexor plantar, babinski absent  SENSORY: dec to touch left UE and LE, vibration and proprioception intact   COORD: Normal finger to nose, no tremor, no dysmetria      LABS:                        14.8   11.74 )-----------( 275      ( 30 Mar 2024 11:06 )             43.9     03-30    135  |  105  |  17  ----------------------------<  127<H>  4.7   |  21<L>  |  0.67    Ca    10.8<H>      30 Mar 2024 11:06    TPro  7.4  /  Alb  4.4  /  TBili  0.3  /  DBili  x   /  AST  19  /  ALT  24  /  AlkPhos  116  03-30    Hemoglobin A1C:   Vitamin B12   PT/INR - ( 30 Mar 2024 11:06 )   PT: 10.1 sec;   INR: 0.88          PTT - ( 30 Mar 2024 11:06 )  PTT:27.1 sec  CAPILLARY BLOOD GLUCOSE  138 (30 Mar 2024 12:02)      POCT Blood Glucose.: 138 mg/dL (30 Mar 2024 10:57)      Urinalysis Basic - ( 30 Mar 2024 11:55 )    Color: Yellow / Appearance: Clear / SG: >1.030 / pH: x  Gluc: x / Ketone: Negative mg/dL  / Bili: Negative / Urobili: 0.2 mg/dL   Blood: x / Protein: Negative mg/dL / Nitrite: Negative   Leuk Esterase: Negative / RBC: x / WBC x   Sq Epi: x / Non Sq Epi: x / Bacteria: x            Microbiology:    Urinalysis with Rflx Culture (collected 30 Mar 2024 11:55)        RADIOLOGY, EKG AND ADDITIONAL TESTS: Reviewed.

## 2024-03-30 NOTE — ED PROVIDER NOTE - CLINICAL SUMMARY MEDICAL DECISION MAKING FREE TEXT BOX
Pt s/p recent cva, tnk c/o R ha, L sided weakness worse than baseline.  ? new cva/bleed, ? infection or lyte abnl exacerbating underlying stroke deficits (notes sob at baseline but mentioned cough w/o uri sx, no other localizing sx to suggest infection). Stroke code called; plan labs, ct's, stroke eval; reassess. See progress notes for further mdm related documentation.

## 2024-03-30 NOTE — CONSULT NOTE ADULT - NIH STROKE SCALE: 3. VISUAL, QM
(0) No visual loss Spine appears normal, range of motion is not limited, no muscle or joint tenderness

## 2024-03-30 NOTE — ED PROVIDER NOTE - PHYSICAL EXAMINATION
VITAL SIGNS: I have reviewed nursing notes and confirm.  CONSTITUTIONAL:  in no acute distress.   SKIN:  warm and dry, no acute rash.   HEAD:  normocephalic, atraumatic.  EYES: PERRL, EOM intact; conjunctiva and sclera clear.  ENT: No nasal discharge; airway clear.   NECK: Supple; non tender.  CARD: S1, S2 normal; no murmurs, gallops, or rubs. Regular rate and rhythm.   RESP:  Clear to auscultation b/l, no wheezes, rales or rhonchi.  ABD: Normal bowel sounds; soft; non-distended; non-tender; no guarding/ rebound.  MSK: Normal ROM. No clubbing, cyanosis or edema. no ttp bilat le  NEURO: Alert, oriented, cn grossly intact, motor 5/5 RU/LE, 4/5 WIL/LE, decreased sensation L U/LE  PSYCH: Cooperative, mood and affect appropriate.

## 2024-03-30 NOTE — ED ADULT NURSE NOTE - OBJECTIVE STATEMENT
The patient is a 68y Female c/o headache that feels like "pains in the head", and increased L arm, and L leg weakness x yesterday late afternoon. Pmhx CVA, s/p TNK 18 Mar 2024, with some residual weakness on the L side that is worse since yesterday afternoon as per pt. Pt endorses blurry vision for " a long time" unclear if worse today. Pt denies dizziness, CP, F/C, N/V/D. The patient is a 68y Female c/o R headache that feels like "pains in the head", and increased L arm, and L leg weakness x yesterday late afternoon. Pmhx CVA, s/p TNK 18 Mar 2024, with some residual weakness on the L side that is worse since yesterday afternoon as per pt. Pt endorses blurry vision for " a long time" unclear if worse today. Pt denies dizziness, CP, F/C, N/V/D.

## 2024-03-30 NOTE — ED PROVIDER NOTE - OBJECTIVE STATEMENT
69 yo F h/o tobacco use disorder, HTN, COPD, DM, angina recent admit 3/18 for L sided weakness w CTH no acute intracranial hemorrhage, mass effect or large demarcated territorial infarction. Punctate linear density in the right cerebellar hemisphere likely representing calcification s/p TNK.  During admit, TTE unremarkable. 24hr CTH neg for bleed. MRI brain without acute infarction. 8 mm nonspecific focus of FLAIR prolongation right cerebellar hemisphere, situated in the region of calcification on CT, of uncertain etiology and significance.   Pt returns c/o intermittent sharp R sided head pains starting late afternoon yest and increased blurred vision, L sided weakness increased from baseline.  Pt denies cp, notes + chronic sob, + palpitation sensation.  Pt also notes cough w/o sputum, no associated fever, other uri sx.

## 2024-04-04 ENCOUNTER — APPOINTMENT (OUTPATIENT)
Dept: NEUROLOGY | Facility: CLINIC | Age: 69
End: 2024-04-04

## 2024-04-06 ENCOUNTER — EMERGENCY (EMERGENCY)
Facility: HOSPITAL | Age: 69
LOS: 1 days | Discharge: HOME | End: 2024-04-06
Attending: EMERGENCY MEDICINE | Admitting: EMERGENCY MEDICINE
Payer: MEDICARE

## 2024-04-06 VITALS
SYSTOLIC BLOOD PRESSURE: 167 MMHG | OXYGEN SATURATION: 96 % | TEMPERATURE: 98 F | RESPIRATION RATE: 18 BRPM | HEART RATE: 74 BPM | DIASTOLIC BLOOD PRESSURE: 82 MMHG

## 2024-04-06 VITALS
SYSTOLIC BLOOD PRESSURE: 163 MMHG | RESPIRATION RATE: 17 BRPM | DIASTOLIC BLOOD PRESSURE: 84 MMHG | TEMPERATURE: 99 F | OXYGEN SATURATION: 95 % | HEIGHT: 57 IN | HEART RATE: 97 BPM

## 2024-04-06 PROBLEM — I10 ESSENTIAL (PRIMARY) HYPERTENSION: Chronic | Status: ACTIVE | Noted: 2024-03-30

## 2024-04-06 PROBLEM — J44.9 CHRONIC OBSTRUCTIVE PULMONARY DISEASE, UNSPECIFIED: Chronic | Status: ACTIVE | Noted: 2024-03-30

## 2024-04-06 PROBLEM — I63.9 CEREBRAL INFARCTION, UNSPECIFIED: Chronic | Status: ACTIVE | Noted: 2024-03-30

## 2024-04-06 PROBLEM — E11.9 TYPE 2 DIABETES MELLITUS WITHOUT COMPLICATIONS: Chronic | Status: ACTIVE | Noted: 2024-03-30

## 2024-04-06 LAB
ANION GAP SERPL CALC-SCNC: 10 MMOL/L — SIGNIFICANT CHANGE UP (ref 5–17)
BASOPHILS # BLD AUTO: 0.06 K/UL — SIGNIFICANT CHANGE UP (ref 0–0.2)
BASOPHILS NFR BLD AUTO: 0.4 % — SIGNIFICANT CHANGE UP (ref 0–2)
BUN SERPL-MCNC: 17 MG/DL — SIGNIFICANT CHANGE UP (ref 7–23)
CALCIUM SERPL-MCNC: 9.3 MG/DL — SIGNIFICANT CHANGE UP (ref 8.4–10.5)
CHLORIDE SERPL-SCNC: 108 MMOL/L — SIGNIFICANT CHANGE UP (ref 96–108)
CO2 SERPL-SCNC: 20 MMOL/L — LOW (ref 22–31)
CREAT SERPL-MCNC: 0.85 MG/DL — SIGNIFICANT CHANGE UP (ref 0.5–1.3)
EGFR: 76 ML/MIN/1.73M2 — SIGNIFICANT CHANGE UP
EOSINOPHIL # BLD AUTO: 0.42 K/UL — SIGNIFICANT CHANGE UP (ref 0–0.5)
EOSINOPHIL NFR BLD AUTO: 2.9 % — SIGNIFICANT CHANGE UP (ref 0–6)
GLUCOSE SERPL-MCNC: 148 MG/DL — HIGH (ref 70–99)
HCT VFR BLD CALC: 38.9 % — SIGNIFICANT CHANGE UP (ref 34.5–45)
HGB BLD-MCNC: 13.1 G/DL — SIGNIFICANT CHANGE UP (ref 11.5–15.5)
IMM GRANULOCYTES NFR BLD AUTO: 0.3 % — SIGNIFICANT CHANGE UP (ref 0–0.9)
LYMPHOCYTES # BLD AUTO: 24.8 % — SIGNIFICANT CHANGE UP (ref 13–44)
LYMPHOCYTES # BLD AUTO: 3.6 K/UL — HIGH (ref 1–3.3)
MCHC RBC-ENTMCNC: 32.3 PG — SIGNIFICANT CHANGE UP (ref 27–34)
MCHC RBC-ENTMCNC: 33.7 GM/DL — SIGNIFICANT CHANGE UP (ref 32–36)
MCV RBC AUTO: 96 FL — SIGNIFICANT CHANGE UP (ref 80–100)
MONOCYTES # BLD AUTO: 1.07 K/UL — HIGH (ref 0–0.9)
MONOCYTES NFR BLD AUTO: 7.4 % — SIGNIFICANT CHANGE UP (ref 2–14)
NEUTROPHILS # BLD AUTO: 9.34 K/UL — HIGH (ref 1.8–7.4)
NEUTROPHILS NFR BLD AUTO: 64.2 % — SIGNIFICANT CHANGE UP (ref 43–77)
NRBC # BLD: 0 /100 WBCS — SIGNIFICANT CHANGE UP (ref 0–0)
PLATELET # BLD AUTO: 302 K/UL — SIGNIFICANT CHANGE UP (ref 150–400)
POTASSIUM SERPL-MCNC: 4.1 MMOL/L — SIGNIFICANT CHANGE UP (ref 3.5–5.3)
POTASSIUM SERPL-SCNC: 4.1 MMOL/L — SIGNIFICANT CHANGE UP (ref 3.5–5.3)
RBC # BLD: 4.05 M/UL — SIGNIFICANT CHANGE UP (ref 3.8–5.2)
RBC # FLD: 12.6 % — SIGNIFICANT CHANGE UP (ref 10.3–14.5)
SODIUM SERPL-SCNC: 138 MMOL/L — SIGNIFICANT CHANGE UP (ref 135–145)
WBC # BLD: 14.54 K/UL — HIGH (ref 3.8–10.5)
WBC # FLD AUTO: 14.54 K/UL — HIGH (ref 3.8–10.5)

## 2024-04-06 PROCEDURE — 96374 THER/PROPH/DIAG INJ IV PUSH: CPT

## 2024-04-06 PROCEDURE — 99284 EMERGENCY DEPT VISIT MOD MDM: CPT | Mod: 25

## 2024-04-06 PROCEDURE — 85025 COMPLETE CBC W/AUTO DIFF WBC: CPT

## 2024-04-06 PROCEDURE — 36415 COLL VENOUS BLD VENIPUNCTURE: CPT

## 2024-04-06 PROCEDURE — 99284 EMERGENCY DEPT VISIT MOD MDM: CPT

## 2024-04-06 PROCEDURE — 80048 BASIC METABOLIC PNL TOTAL CA: CPT

## 2024-04-06 RX ORDER — LIDOCAINE 4 G/100G
1 CREAM TOPICAL ONCE
Refills: 0 | Status: COMPLETED | OUTPATIENT
Start: 2024-04-06 | End: 2024-04-06

## 2024-04-06 RX ORDER — ACETAMINOPHEN 500 MG
975 TABLET ORAL ONCE
Refills: 0 | Status: COMPLETED | OUTPATIENT
Start: 2024-04-06 | End: 2024-04-06

## 2024-04-06 RX ORDER — LIDOCAINE 4 G/100G
1 CREAM TOPICAL
Qty: 2 | Refills: 0
Start: 2024-04-06

## 2024-04-06 RX ORDER — KETOROLAC TROMETHAMINE 30 MG/ML
15 SYRINGE (ML) INJECTION ONCE
Refills: 0 | Status: DISCONTINUED | OUTPATIENT
Start: 2024-04-06 | End: 2024-04-06

## 2024-04-06 RX ORDER — DIPHENHYDRAMINE HCL 50 MG
25 CAPSULE ORAL ONCE
Refills: 0 | Status: COMPLETED | OUTPATIENT
Start: 2024-04-06 | End: 2024-04-06

## 2024-04-06 RX ADMIN — Medication 25 MILLIGRAM(S): at 05:14

## 2024-04-06 RX ADMIN — LIDOCAINE 1 PATCH: 4 CREAM TOPICAL at 04:23

## 2024-04-06 RX ADMIN — Medication 15 MILLIGRAM(S): at 04:22

## 2024-04-06 RX ADMIN — Medication 975 MILLIGRAM(S): at 07:33

## 2024-04-06 NOTE — ED ADULT NURSE NOTE - CHIEF COMPLAINT QUOTE
Pt presents with c/o "back and leg pain" x one day, denies any injury. Arrives via taxi with 3 large suitcases, states "the ambulance took me to [facility], but I didn't want to be there". States "I got an EKG and blood work and they gave me pain medication in an IV". Pt reports pain and spasms to bilateral lower back with radiation to left buttock/left leg, Hx of CVA per pt.

## 2024-04-06 NOTE — ED ADULT NURSE NOTE - OBJECTIVE STATEMENT
pt with complains of lower back pain and left thigh pain started today, been in [facility] treated and discharged, still with pain decided to come here from [facility]

## 2024-04-06 NOTE — ED PROVIDER NOTE - NSDCPRINTRESULTS_ED_ALL_ED
Call Sherly Godoy MD office [phone] 
Patient requests all Lab, Cardiology, and Radiology Results on their Discharge Instructions

## 2024-04-06 NOTE — ED PROVIDER NOTE - PATIENT PORTAL LINK FT
You can access the FollowMyHealth Patient Portal offered by [facility] by registering at the following website: [email or web address]. By joining [email or web address]’s FollowMyHealth portal, you will also be able to view your health information using other applications (apps) compatible with our system.

## 2024-04-06 NOTE — ED ADULT NURSE REASSESSMENT NOTE - NS ED NURSE REASSESS COMMENT FT1
Received report from night RN. Pt awake a/ox3, unlabored even respirations no acute distress. Safety maintained, all needs met. Awaiting social work this AM.

## 2024-04-06 NOTE — ED PROVIDER NOTE - CLINICAL SUMMARY MEDICAL DECISION MAKING FREE TEXT BOX
left lower back pain radiating to left thigh, worse with movement, no new neuro deficits, pt recently admitted for L sided weakness. pt had been dc'd to [facility] and left ama from facility twice. no vomiting. no abd pain. no incontinence. doubt cord compression. likely radiculopathy  -check labs  -toradol

## 2024-04-06 NOTE — ED PROVIDER NOTE - PROGRESS NOTE DETAILS
pt c/o left upper lip swelling. denies trauma. no intraoral swelling, no drooling. no stridor, no resp distress. possible allergic reaction. will give benadryl pt requesting to go back to rehab facility. will have SW see pt this morning. no lip swelling currently.

## 2024-04-06 NOTE — ED PROVIDER NOTE - NSFOLLOWUPINSTRUCTIONS_ED_ALL_ED_FT
Back Pain    WHAT YOU NEED TO KNOW:    Back pain is common. You may have back pain and muscle spasms. You may feel sore or stiff on one or both sides of your back. The pain may spread to your lower body. Conditions that affect the spine, joints, or muscles can cause back pain. These may include arthritis, spinal stenosis (narrowing of the spinal column), muscle tension, or breakdown of the spinal discs.    DISCHARGE INSTRUCTIONS:    Call your local emergency number (911 in the [place]) if:    You have severe back pain with chest pain.    You cannot control your urine or bowel movements.    Your pain becomes so severe that you cannot walk.  Return to the emergency department if:    You have pain, numbness, or weakness in one or both legs.    You have severe back pain, nausea, and vomiting.    You have severe back pain that spreads to your side or genital area.  Call your doctor if:    You have back pain that does not get better with rest and pain medicine.    You have a fever.    You have pain that worsens when you are on your back or when you rest.    You have pain that worsens when you cough or sneeze.    You lose weight without trying.    You have questions or concerns about your condition or care.  Medicines: You may need any of the following:    NSAIDs help decrease swelling and pain or fever. This medicine is available with or without a doctor's order. NSAIDs can cause stomach bleeding or kidney problems in certain people. If you take blood thinner medicine, always ask your healthcare provider if NSAIDs are safe for you. Always read the medicine label and follow directions.    Acetaminophen decreases pain and fever. It is available without a doctor's order. Ask how much to take and how often to take it. Follow directions. Read the labels of all other medicines you are using to see if they also contain acetaminophen, or ask your doctor or pharmacist. Acetaminophen can cause liver damage if not taken correctly.    Muscle relaxers help decrease muscle spasms and back pain.    Prescription pain medicine may be given. Ask your healthcare provider how to take this medicine safely. Some prescription pain medicines contain acetaminophen. Do not take other medicines that contain acetaminophen without talking to your healthcare provider. Too much acetaminophen may cause liver damage. Prescription pain medicine may cause constipation. Ask your healthcare provider how to prevent or treat constipation.    Take your medicine as directed. Contact your healthcare provider if you think your medicine is not helping or if you have side effects. Tell your provider if you are allergic to any medicine. Keep a list of the medicines, vitamins, and herbs you take. Include the amounts, and when and why you take them. Bring the list or the pill bottles to follow-up visits. Carry your medicine list with you in case of an emergency.  How to manage your back pain:    Apply ice on your back for 15 to 20 minutes every hour or as directed. Use an ice pack, or put crushed ice in a plastic bag. Cover it with a towel before you apply it to your skin. Ice helps prevent tissue damage and decreases pain.    Apply heat on your back for 20 to 30 minutes every 2 hours for as many days as directed. Heat helps decrease pain and muscle spasms.    Stay active as much as you can without causing more pain. Bed rest could make your back pain worse. Avoid heavy lifting until your pain is gone.    Go to physical therapy as directed. A physical therapist can teach you exercises to help improve movement and strength, and to decrease pain.  Follow up with your doctor in 2 weeks, or as directed: You might need to see a specialist. Write down your questions so you remember to ask them during your visits.

## 2024-04-06 NOTE — ED PROVIDER NOTE - OBJECTIVE STATEMENT
68F hx htn, copd, dm, cva, c/o left lower back pain. pt states pain started tonight and radiates to left thigh. no new numbness/weakness.  was taken to [facility] by EMS tonight for pain and had labs drawn and was given morphine. pt states pain did not improve. states also concerned that her BP is elevated, even though she took her losartan. no recent trauma.

## 2024-04-06 NOTE — ED ADULT TRIAGE NOTE - CHIEF COMPLAINT QUOTE
Pt presents with c/o "back and leg pain" x one day, denies any injury. Arrives via taxi with 3 large suitcases, states "the ambulance took me to [facility], but I didn't want to be there". States "I got an EKG and blood work and they gave me pain medication in an IV". Pt reports pain and spasms to bilateral lower back with radiation to left buttock/left leg, Hx of CVA per pt.

## 2024-04-06 NOTE — ED PROVIDER NOTE - MUSCULOSKELETAL MINIMAL EXAM
+left lower lumbar ttp, +left buttock pain, worse with leg lift., no edema, no erythema, compartments soft/neck supple

## 2024-04-06 NOTE — ED PROVIDER NOTE - PATIENT'S PREFERRED PRONOUN
----- Message from IMAN Jeffers sent at 7/28/2021  9:18 AM CDT -----  Pt having colonoscopy today, but please make sure the pt was informed his labs and GI path were neg. Thanks    Her/She

## 2024-04-06 NOTE — ED ADULT NURSE NOTE - NSFALLUNIVINTERV_ED_ALL_ED
Bed/Stretcher in lowest position, wheels locked, appropriate side rails in place/Call bell, personal items and telephone in reach/Instruct patient to call for assistance before getting out of bed/chair/stretcher/Non-slip footwear applied when patient is off stretcher/[place] to call system/Physically safe environment - no spills, clutter or unnecessary equipment/Purposeful proactive rounding/Room/bathroom lighting operational, light cord in reach

## 2024-04-07 DIAGNOSIS — I10 ESSENTIAL (PRIMARY) HYPERTENSION: ICD-10-CM

## 2024-04-07 DIAGNOSIS — E11.9 TYPE 2 DIABETES MELLITUS WITHOUT COMPLICATIONS: ICD-10-CM

## 2024-04-07 DIAGNOSIS — M54.16 RADICULOPATHY, LUMBAR REGION: ICD-10-CM

## 2024-04-07 DIAGNOSIS — J44.9 CHRONIC OBSTRUCTIVE PULMONARY DISEASE, UNSPECIFIED: ICD-10-CM

## 2024-04-07 DIAGNOSIS — F17.200 NICOTINE DEPENDENCE, UNSPECIFIED, UNCOMPLICATED: ICD-10-CM

## 2024-04-07 DIAGNOSIS — M54.50 LOW BACK PAIN, UNSPECIFIED: ICD-10-CM

## 2024-04-07 DIAGNOSIS — Z86.73 PERSONAL HISTORY OF TRANSIENT ISCHEMIC ATTACK (TIA), AND CEREBRAL INFARCTION WITHOUT RESIDUAL DEFICITS: ICD-10-CM

## 2024-04-08 ENCOUNTER — EMERGENCY (EMERGENCY)
Facility: HOSPITAL | Age: 69
LOS: 1 days | Discharge: HOME | End: 2024-04-08
Attending: EMERGENCY MEDICINE | Admitting: EMERGENCY MEDICINE
Payer: MEDICARE

## 2024-04-08 VITALS
TEMPERATURE: 98 F | RESPIRATION RATE: 18 BRPM | WEIGHT: 128.09 LBS | OXYGEN SATURATION: 95 % | HEIGHT: 57 IN | HEART RATE: 95 BPM | SYSTOLIC BLOOD PRESSURE: 178 MMHG | DIASTOLIC BLOOD PRESSURE: 81 MMHG

## 2024-04-08 PROCEDURE — 99284 EMERGENCY DEPT VISIT MOD MDM: CPT

## 2024-04-08 RX ORDER — KETOROLAC TROMETHAMINE 30 MG/ML
30 SYRINGE (ML) INJECTION ONCE
Refills: 0 | Status: DISCONTINUED | OUTPATIENT
Start: 2024-04-08 | End: 2024-04-08

## 2024-04-08 RX ORDER — ACETAMINOPHEN 500 MG
650 TABLET ORAL ONCE
Refills: 0 | Status: COMPLETED | OUTPATIENT
Start: 2024-04-08 | End: 2024-04-08

## 2024-04-08 RX ORDER — ACETAMINOPHEN 500 MG
1000 TABLET ORAL ONCE
Refills: 0 | Status: DISCONTINUED | OUTPATIENT
Start: 2024-04-08 | End: 2024-04-08

## 2024-04-08 RX ADMIN — Medication 30 MILLIGRAM(S): at 17:39

## 2024-04-08 NOTE — ED PROVIDER NOTE - CLINICAL SUMMARY MEDICAL DECISION MAKING FREE TEXT BOX
ED evaluation and management discussed with the patient and family (if available) in detail.  Close PMD follow up encouraged.  Strict ED return instructions discussed in detail and patient given the opportunity to ask any questions about their discharge diagnosis and instructions. Patient verbalized understanding. acute on chronic left lower lateral back pain   no cardio pulmonary neurologic symptoms     ED evaluation and management discussed with the patient and family (if available) in detail.  Close PMD follow up encouraged.  Strict ED return instructions discussed in detail and patient given the opportunity to ask any questions about their discharge diagnosis and instructions. Patient verbalized understanding.

## 2024-04-08 NOTE — ED PROVIDER NOTE - CONSTITUTIONAL, MLM
normal... Well appearing, awake, alert, oriented to person, place, time/situation and in no mild distress. pt is pressing her left hand against her left lower lateral back area

## 2024-04-08 NOTE — ED BEHAVIORAL HEALTH NOTE - BEHAVIORAL HEALTH NOTE
SW met with patient to discuss discharge plan. SW provided homeless shelter resources. Patient is in agreement to be discharged to [facility]. [facility] arranged [employer] for 7PM. Team aware. SW will continue to remain available.

## 2024-04-08 NOTE — ED ADULT TRIAGE NOTE - CCCP TRG CHIEF CMPLNT
[address] [facility]  [address] [facility] [address]  Dept: [phone]  Dept Fax: [phone]    Gordon Connell is a 64 y.o. male who presents to the urgent care today for his medical conditions/complaints as notedbelow. Gordon Connell is c/o of Rash (Onset 1-2 weeks )      HPI:     Patient presents to the walk in clinic for evaluation of a rash. Patient was seen by his PCP on 10/23/2023 and diagnosed with poison Ivy dermatitis as well as acute cellulitis. Patient was given doxycycline as well as DEPO injection and kenalog cream.     Rash  This is a new problem. The current episode started 1 to 4 weeks ago (2.5 weeks ago, started on foot). The problem has been gradually worsening since onset. The rash is diffuse. The rash is characterized by itchiness, redness and swelling. He was exposed to a new medication. Pertinent negatives include no anorexia, congestion, cough, diarrhea, eye pain, facial edema, fatigue, fever, joint pain, nail changes, rhinorrhea, shortness of breath, sore throat or vomiting. Past treatments include antibiotics and topical steroids. There is no history of allergies or asthma. Past Medical History:   Diagnosis Date    Anxiety 6/3/2015    GERD (gastroesophageal reflux disease) 6/3/2015    Hyperlipidemia     Hypertension     Obesity, Class III, BMI 40-49.9 (morbid obesity) ([address] [facility]) 6/3/2015    Obstructive sleep apnea     uses cpap at home        Current Outpatient Medications   Medication Sig Dispense Refill    clobetasol (TEMOVATE) 0.05 % cream Apply topically 2 times daily.  60 g 1    hydrOXYzine HCl (ATARAX) 25 MG tablet Take 1 tablet by mouth every 8 hours as needed for Itching 30 tablet 0    atorvastatin (LIPITOR) 40 MG tablet Take 1 tablet by mouth at bedtime 90 tablet 2    dapagliflozin (FARXIGA) 10 MG tablet Take 1 tablet by mouth every morning 90 tablet 1
back pain general

## 2024-04-08 NOTE — ED PROVIDER NOTE - NEUROLOGICAL, MLM
Alert and oriented, no focal deficits, no motor or sensory deficits. pt Is able to walk without assistance from chair k to bathroom and back

## 2024-04-08 NOTE — ED PROVIDER NOTE - OBJECTIVE STATEMENT
[facility] and [facility] records reviewed     68F hx htn, copd, dm, cva, c/o left lower back pain. pt states pain started today.   the pain is "same" chronic back pain but today with exacerbation. the pain is sharp persistent made worse with sitting. relieved while standing  the patient was evaluated and treated for the same pain 2 days ago at [facility] ER     the patient denies weakness/numbness to legs or in perineal area (no change of sensation to groin / vagnial area when wiping with toilet paper) no urinary or fecal incontinence   no trauma no fever  no rash     The patient denies the following symptoms:  headache, dizziness/lightheadedness, neck pain/neck stiffness, numbness/tingling, photophobia, change in vision/hearing/gait/mental status/speech,difficulty swallowing, focal weakness, rash, fever, chills, chest/neck/jaw/arm, palpitations, shortness of breath, diaphoresis, swelling to arm and/or legs, N/V/D, abdominal pain, abdominal distention, back pain, change in urinary or bowel function, dysuria,or  hematuria

## 2024-04-08 NOTE — ED ADULT NURSE NOTE - CHIEF COMPLAINT QUOTE
Pt BIBA from street with C/O Lt lower back pain radiating to Lt leg. Pt was seen 2 days ago at [facility] for same complaint. Has been carrying heavy suitcases. No new injury.

## 2024-04-08 NOTE — ED ADULT NURSE NOTE - CAS EDP DISCH TYPE
CHIEF COMPLAINT:   Follow-up for COPD, other issues.    HPI:    Rona Ryan is an 70 year old female, assigned to me, who presents today via TeleHealth for follow-up of the following issue/s:    1.  COPD, chronic, oxygen dependent.      Current medications:     - Pulmicort Flexhaler 180 MCG/ACT inhaler 2 puff, 2 TIMES DAILY,      - glycopyrrolate-formoterol (Bevespi Aerosphere) 9-4.8 MCG/ACT inhaler 2 puff, 2 TIMES DAILY,      - levalbuterol (XOPENEX HFA) 45 MCG/ACT inhaler 2 puff, EVERY 4 HOURS PRN,      - levalbuterol (XOPENEX) 0.63 MG/3ML nebulizer solution 0.63 mg, EVERY 6 HOURS PRN.      - prednisone 10 mg daily    Presently, she feels fairly well with regard to her breathing.  She is trying to scale back on the oxygen when she can, but finds that she still requires 3-5 L with even moderate activity.  She currently has no cough and feels no wheezing.    2.  Dyslipidemia.  The patient has a history of mixed hyperlipidemia.  She is currently on pravastatin (Pravachol), 20 mg daily, in addition to dietary modifications, and is tolerating current therapy.     Her most recent lipid profiles are as follows:    Cholesterol (mg/dL)   Date Value   02/27/2023 257 (H)   10/01/2021 222 (H)   12/09/2020 236 (H)     LDL (mg/dL)   Date Value   02/27/2023 119   10/01/2021 125   12/09/2020 122     HDL (mg/dL)   Date Value   02/27/2023 115   10/01/2021 78   12/09/2020 99     Triglycerides (mg/dL)   Date Value   02/27/2023 114   10/01/2021 94   12/09/2020 77     She presently feels well with regard to her cholesterol, and specifically denies any muscle cramping, GI symptoms, or other concerns.    3.  Osteoporosis.  Her most recent DEXA scan in May 2021 showed evidence of osteoporosis.  She takes vitamin-D with calcium.  She has some concerns about taking too much calcium, so she is going to scale back a little bit.  Her most recent calcium level in July 2022 was normal at 9.3.    4.  Hx of a-fib w/ RVR.  Doing well.  No  symptoms of palpitations, lightheadedness, or chest pain.  She most recently saw her cardiologist last month and was advised to decrease her Cardizem dosage slightly but otherwise she was kept on all usual treatment.    5.  Anxiety.  She takes the Xanax twice a day because the prednisone causes her to feel very anxious.  As noted above, taking the Xanax calms her down quite a bit and this helps her breathing.  At night, this is particularly helpful and allows her to sleep more soundly.     6.  Back pain; osteoarthritis.  Lower back, upper back and shoulders, and hips occasionally cause pain, particularly if she has been up and about more frequently.  Since she has been on the prednisone more regularly, the flare ups have been less frequent.     7.  Cataracts, bilateral.  Doing well.     8.  Hair loss.  She has concerns about some hair loss.  No specific areas.  No dry scalp.     9.  Vitamin D deficiency.  She has a history of low vitamin D.    Recent vitamin D trends:    VITAMIND, 25 HYDROXY (ng/mL)   Date Value   09/14/2016 31.1   02/04/2016 28.3 (L)     Vitamin D, 25-Hydroxy (ng/mL)   Date Value   02/27/2023 27.9 (L)     She currently supplements with 1,000 IU daily of vitamin D3, and is tolerating current therapy.. Diet is described as normal, and includes typical dairy items.  Outside sun exposure is limited.  She presently feels well, and specifically denies any fatigue, decreased appetite, unexplained pain, or GI symptoms.    10.  GERD.  The patient has a history of gastroesophageal reflux disease, diagnosed on the basis of symptoms.  She is not currently taking any GERD or reflux medication.   She presently feels well with regard to her GERD, and specifically denies any heartburn, nausea or vomiting, chest pain, nighttime cough, epigastric pain, diarrhea or constipation, or black or tarry stools.    11.  R foot pain, chronic.  Complains of chronic right foot pain, for 42 years. At times, it flares up. Deny  swelling and redness. Pain on walking.      12.  Neuropathy, B feet.  She has a history of neuropathy. Numbness at bottom of both feet, since many years.  No recent changes    REVIEW OF SYSTEMS:    Comprehensive review of systems was reviewed and discussed with the patient, and was otherwise negative, except as noted in the history of present illness, above.    PAST MEDICAL, SURGICAL, MEDICATION, ALLERGY, & SOCIAL HISTORIES:    I have reviewed the past medical history, family history, social history, medications and allergies listed in the medical record as obtained by my nursing staff and support staff and agree with their documentation.    Current Outpatient Medications   Medication Sig Dispense Refill   • [START ON 11/6/2023] Cholecalciferol 1.25 mg (50,000 units) capsule Take 1 capsule by mouth 1 day a week. 12 capsule 3   • ALPRAZolam (XANAX) 0.25 MG tablet Take 1 tablet by mouth in the morning and 1 tablet in the evening. Indications: Feeling Anxious. 60 tablet 0   • Calcium Carbonate Antacid (TUMS PO) Take 1 tablet by mouth daily as needed.     • loratadine (CLARITIN) 10 MG tablet Take 10 mg by mouth daily as needed.     • VITAMIN D PO Take by mouth daily.     • Glycopyrrolate-Formoterol (Bevespi Aerosphere) 9-4.8 MCG/ACT inhaler Inhale 2 puffs into the lungs 2 times daily. Indications: Chronic Obstructive Lung Disease     • levalbuterol (XOPENEX HFA) 45 MCG/ACT inhaler Inhale 2 puffs into the lungs every 4 hours as needed. Indications: Reversible Disease of Blockage in Breathing Passages     • digoxin (LANOXIN) 0.125 MG tablet Take 1 tablet by mouth once daily 90 tablet 3   • dilTIAZem (dilTIAZem CD) 180 MG 24 hr capsule Take 1 capsule by mouth daily. 90 capsule 3   • predniSONE (DELTASONE) 10 MG tablet Take 10 mg by mouth daily. Indications: Chronic Obstructive Lung Disease     • apixaBAN (Eliquis) 5 MG Tab Take 1 tablet by mouth 2 times daily. 60 tablet 11   • polyethylene glycol (MIRALAX) 17 g packet Stir  and dissolve 17 g of  powder in any 4 to 8 ounces of beverage, then drink daily as needed (constipation). 15 each 0   • oxygen (O2) gas Inhale 4 L/min into the lungs continuous. Oxygen via nasal cannula at 4 - 6 LPM  Indications: COPD     • Lactobacillus (Acidophilus) Cap Take 1 capsule by mouth daily. Indications: Heartburn     • cyclobenzaprine (FLEXERIL) 10 MG tablet Take 1 tablet by mouth 3 times daily as needed for Muscle spasms. 30 tablet 0   • Pulmicort Flexhaler 180 MCG/ACT inhaler Inhale 2 puffs into the lungs 2 times daily. Indications: Chronic Obstructive Lung Disease     • cannabidiol (CBD) Indications: PRN        No current facility-administered medications for this visit.     Past Medical History:   Diagnosis Date   • Asthma    • Bronchitis    • Chronic pain     left shoulder   • COPD (chronic obstructive pulmonary disease) (CMD)    • Diverticulosis 08/17/2018    Dr. Boston   • Dystonia     secondary   • Internal hemorrhoids 08/17/2018    Dr. 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 Boston   • Pneumonia    • Urinary tract infection 01/2018     Past Surgical History:   Procedure Laterality Date   • Breast biopsy Left     2001 or 2002 pt is unsure, ifeanyi langley   • Breast cyst aspiration Left Unknown    Left breast 2010 ifeanyi giraldo   • Ramírez procedure  1998   • Colonoscopy diagnostic  08/17/2018    Dr. Boston/ Colonoscopy/ Diverticulosis and Internal hemorrhoids/ Recall 5 yrs due to hx of high grade Tubular adenoma polyps   • Colonoscopy remove lesions by snare  10/27/2010    Sigmoid polyp,f-u 5 yrs per Dr. Lamb    • Esophagogastroduodenoscopy transoral flex w/bx single or mult  08/17/2018    Dr. Boston/ EGD/ Normal   • Tubal ligation  1997     ALLERGIES:   Allergen Reactions    • Symbicort [Budesonide-Formoterol Fumarate] SHORTNESS OF BREATH     LUNG INFECTION   • Amoxicillin-K Clavulanate-Saccharin RASH   • Ciprofloxacin Palpitations   • Tamiflu Other (See Comments)     Wheezing   • Wellbutrin [Bupropion Hcl] HIVES   • Codeine Sulfate RASH   • Erythromycin Base CARDIAC DISTURBANCES     Mouth dryness,cracking  All erythromyacin based drugs   • Bacitracin-Neomycin-Polymyxin RASH     Social History     Tobacco Use   • Smoking status: Former     Current packs/day: 0.00     Average packs/day: 1 pack/day for 38.0 years (38.0 ttl pk-yrs)     Types: Cigarettes     Start date:      Quit date: 2010     Years since quittin.8     Passive exposure: Past   • Smokeless tobacco: Never   Vaping Use   • Vaping Use: never used   • Substances: CBD   Substance Use Topics   • Alcohol use: Yes     Alcohol/week: 0.0 standard drinks of alcohol     Comment: social drinker   • Drug use: Yes     Types: Prescription Drugs     Family History   Problem Relation Age of Onset   • Other Mother         borderline diabetes   • Heart disease Father         valve replacement    • Cancer, Breast Sister    • Patient is unaware of any medical problems Sister    • Patient is unaware of any medical problems Sister    • Patient is unaware of any medical problems Sister    • Patient is unaware of any medical problems Brother    • Patient is unaware of any medical problems Brother    • Patient is unaware of any medical problems Maternal Grandmother    • Patient is unaware of any medical problems Maternal Grandfather    • Patient is unaware of any medical problems Paternal Grandmother    • Patient is unaware of any medical problems Paternal Grandfather    • Cancer, Breast Maternal Aunt      Immunization History   Administered Date(s) Administered   • COVID Moderna 0.5 mL 12Y+ 2021, 2021   • COVID Moderna Bivalent 0.5 mL 12Y+ 2022   • Influenza Quadrivalent, MDCK, multi-dose (FLUCELVAX) 2023   •  Influenza, High Dose quadrivalent, preserve-free 2020   • Influenza, high dose seasonal, preservative-free 2017   • Influenza, quadrivalent, adjuvanted (Fluad) 2022   • Influenza, quadrivalent, multi-dose 2018, 2019   • Influenza, quadrivalent, preserve-free 2018, 2019, 10/02/2021   • Influenza, seasonal, injectable, trivalent 2017   • Pneumococcal Conjugate 13 Valent Vacc (Prevnar 13) 2014, 2014   • Pneumococcal Conjugate 20 Valent Vacc (Prevnar 20) 2023   • Pneumococcal Polysaccharide Vacc (Pneumovax 23) 2009, 2017   • Pneumococcal, Unspecified Formulation 2014   • Tdap 2007, 2017   • Tuberculin Skin Test Unspecified Formulation 2021, 2021       OBJECTIVE:    There were no vitals taken for this visit.     Home vitals:  137/61 73 131.2 lbs. 95% on 3-4L/min    Physical Exam:    General:  Pleasant, alert, thin, adult white female in no acute distress, breathing fairly comfortably with only slight degree of tachypnea while wearing her nasal cannula oxygen, and well-hydrated.     Labs/Studies:       Labs: 2023:    · Thyroid screening test (TSH) was 2.444 (low < 0.5, normal 0.5-5.0, high > 5).  · Vitamin D level was 27.9 (normal ).  · Complete blood count was normal, showing no signs of anemia or infection.  · Diabetes screen:  Fasting glucose was 81 (normal < 100, borderline 100-125, high > 125).  · Chemistries were normal.  · Lipid profile showed total cholesterol 257 (normal < 200),  (normal < 130, ideal < 100),  (normal > 40, ideal > 50), and triglycerides 114 (normal < 150).     Imagin/31/2022:     EXAM: XR CHEST AP OR PA     INDICATION: weakness     COMPARISON: 2020.     FINDINGS:      The cardiomediastinal silhouette is unremarkable. There are no pleural effusions, focal consolidations, or pneumothoraces. The pulmonary vascularity is normal. Hyperexpanded  lungs.      IMPRESSION:     No acute processes.     -------------------------------------------     8/12/2021:     EXAM: XR CHEST AP OR PA     TECHNIQUE: Portable upright.     INDICATION: sob.     COMPARISON: CT 7/12/2021.     FINDINGS:       Multiple EKG leads overlie the chest. There is a round peripherally dense item over the left upper chest which is likely external to the patient.     The heart is normal in size. The pulmonary vasculature is within normal limits. The lungs are clear. No pleural effusions are seen. There is no evidence for pneumothorax.      IMPRESSION:     1.  No acute abnormality.     ------------------------------------------------     5/06/2021:     DUAL-ENERGY X-RAY ABSORPTIOMETRY (DXA)     History: 68-year-old white female     Technical:   Precision Data/Least Significant Change:  Vida Gonzalez - Sherrell   [facility]     [employer] PRODIGY ADVANCE FULL SIZE     Lumbar Spine (L1-L4) - 0.052 g/cm2  Total Hip - 0.020 g/cm2   Femoral Neck - 0.026 g/cm2      Exam Limitations: L1 is excluded for sclerosis and disparate T-scores     Comparison: None.      Findings:     Lumbar spine:  L2-L4:   0.923 g/cm2  T-score:  -2.3   Z-score:  -0.7     Lowest femoral:  Right total hip  0.651 g/cm2  T-score: -2.8   Z-score: -1.6      All other bone mineral density measurements and statistics are listed in the computer-generated report available in [employer].     All other bone mineral density measurements and statistics are listed in the computer-generated report available in [employer].  The estimate 10-year risk for a major osteoporotic fracture is N/A% and for hip fracture is N/A%.     Frax is generated if the patient's lowest T-score is between -1.0 and -2.4.     Note* FRAX  is not valid if  the patient is under age 40,  has T score of =2.5 at the spine or hip, has history of hip or vertebral (clinical or morphometric) fracture, or is on treatment. http://[email or web address].ac.uk/FRAX      IMPRESSION:       1. Osteoporosis. The patient meets NOF criteria for consideration of pharmacologic therapy.   2. Optimize dietary intake of vitamin D and calcium.   3. Consider repeat bone mineral density testing in 2 years, as clinically appropriate.       Mammogram:  Mammogram in Jully 2022 was normal.      ASSESSMENT:      1. Chronic obstructive pulmonary disease, unspecified COPD type (CMD)    2. Mixed hyperlipidemia    3. Age-related osteoporosis without current pathological fracture    4. Atrial fibrillation with RVR (CMD)    5. Anxiety    6. Primary osteoarthritis of both hips    7. Lumbar spondylosis    8. Spinal stenosis of lumbar region, unspecified whether neurogenic claudication present      1.  COPD, chronic, oxygen dependent.  Stable.  Continue present regimen of medications and inhalers, as well as the oxygen at the prescribed delivery rate.  Follow-up with pulmonology as scheduled.     2.  Dyslipidemia.  Continue the statin.     3.  Osteoporosis.  Stable.  Continue calcium, and we will change the vitamin-D supplementation to once weekly to promote compliance.     4.  Hx of a-fib w/ RVR.  Stable from a cardiovascular standpoint.  Continue present medications.    5.  Anxiety.  Doing well, no active issues or new concerns.     6.  Back pain; osteoarthritis.  Stable.     7.  Cataracts, bilateral.  Stable     8.  Hair loss.  No active issues or concerns.     9.  Cigarette nicotine dependence in remission.  Doing very well    10.  Chronic pain in right foot.  Stable, no change      PLAN:    Meds:  Continue current medications, no changes today.  Diet:  Continue regular diet.  Labs:  No new labs.  Studies:  CT for lung cancer screening offered (will discuss with pulmonologist)  Consults:  Not at this time.  Other:  As above.     Follow-up MWV in 12 months, and routine follow-up in clinic in 6 months.  She is welcome to return sooner for worsening symptoms, intolerance of treatment, or any other concerns.    Orders  Placed This Encounter   • Cholecalciferol 1.25 mg (50,000 units) capsule      Home

## 2024-04-08 NOTE — ED PROVIDER NOTE - PATIENT PORTAL LINK FT
You can access the FollowMyHealth Patient Portal offered by [facility] by registering at the following website: [email or web address]. By joining [employer]’s FollowMyHealth portal, you will also be able to view your health information using other applications (apps) compatible with our system.

## 2024-04-08 NOTE — ED ADULT TRIAGE NOTE - CHIEF COMPLAINT QUOTE
Pt BIBA from street with C/O Lt lower back pain radiating to Lt leg. Pt was seen 2 days ago at [facility] for same complaint. Has been carrying heavy suitcases. No new injury.

## 2024-04-08 NOTE — ED ADULT NURSE NOTE - OBJECTIVE STATEMENT
aox3, breathing even and unlabored on RA. patient attempted to carry luggage and c.o lower back pain. patient unable to sit comfortably.

## 2024-04-10 DIAGNOSIS — M54.50 LOW BACK PAIN, UNSPECIFIED: ICD-10-CM

## 2024-04-10 DIAGNOSIS — Z86.73 PERSONAL HISTORY OF TRANSIENT ISCHEMIC ATTACK (TIA), AND CEREBRAL INFARCTION WITHOUT RESIDUAL DEFICITS: ICD-10-CM

## 2024-04-10 DIAGNOSIS — J44.9 CHRONIC OBSTRUCTIVE PULMONARY DISEASE, UNSPECIFIED: ICD-10-CM

## 2024-04-10 DIAGNOSIS — I10 ESSENTIAL (PRIMARY) HYPERTENSION: ICD-10-CM

## 2024-04-10 DIAGNOSIS — E11.9 TYPE 2 DIABETES MELLITUS WITHOUT COMPLICATIONS: ICD-10-CM

## 2024-04-11 DIAGNOSIS — G89.29 OTHER CHRONIC PAIN: ICD-10-CM

## 2024-07-01 PROBLEM — Z00.00 ENCOUNTER FOR PREVENTIVE HEALTH EXAMINATION: Status: ACTIVE | Noted: 2024-07-01

## 2024-07-23 ENCOUNTER — EMERGENCY (EMERGENCY)
Facility: HOSPITAL | Age: 69
LOS: 1 days | Discharge: HOME | End: 2024-07-23
Attending: EMERGENCY MEDICINE
Payer: MEDICARE

## 2024-07-23 VITALS
DIASTOLIC BLOOD PRESSURE: 96 MMHG | RESPIRATION RATE: 17 BRPM | SYSTOLIC BLOOD PRESSURE: 159 MMHG | OXYGEN SATURATION: 97 % | HEART RATE: 90 BPM | TEMPERATURE: 98 F

## 2024-07-23 PROCEDURE — 99285 EMERGENCY DEPT VISIT HI MDM: CPT | Mod: GC

## 2024-07-23 NOTE — ED ADULT TRIAGE NOTE - CHIEF COMPLAINT QUOTE
pt BIBEMS from group home stating people were stealing from her & it made her upset. pt has no other complaints in triage

## 2024-07-23 NOTE — ED ADULT TRIAGE NOTE - PATIENT ON (OXYGEN DELIVERY METHOD)
Acute Care - Physical Therapy Initial Evaluation  [facility]     Patient Name: Norma Harkins  : 1929  MRN: [id number]  Today's Date: 9/10/2019   Onset of Illness/Injury or Date of Surgery: 09/10/19  Date of Referral to PT: 09/10/19  Referring Physician: Ruddy Ramirez MD      Admit Date: 9/10/2019    Visit Dx:     ICD-10-CM ICD-9-CM   1. COPD exacerbation (CMS/HCC) J44.1 491.21   2. Impaired physical mobility Z74.09 781.99     Patient Active Problem List   Diagnosis   • Hypertension   • Acute respiratory failure with hypoxia (CMS/HCC)   • Low back pain   • Hyperlipidemia   • Paroxysmal atrial fibrillation (CMS/HCC)   • DM (diabetes mellitus) (CMS/HCC)   • Chronic obstructive pulmonary disease (CMS/HCC)   • Morbid obesity (CMS/HCC)   • Physical deconditioning   • Chronic respiratory failure with hypoxia (CMS/HCC)   • Atrial fibrillation (CMS/HCC)   • COPD exacerbation (CMS/HCC)   • Obesity   • CHF (congestive heart failure) (CMS/HCC)   • Arthritis   • CAD (coronary artery disease)   • Disease of thyroid gland   • Shortness of breath   • Hypothyroidism due to acquired atrophy of thyroid   • Hypothyroidism (acquired)     Past Medical History:   Diagnosis Date   • Arthritis    • CAD (coronary artery disease)    • CAD (coronary artery disease)    • CHF (congestive heart failure) (CMS/HCC)    • Chronic obstructive pulmonary disease (CMS/HCC)    • Chronic respiratory failure with hypoxia (CMS/HCC)    • Diabetes mellitus (CMS/HCC)    • Disease of thyroid gland    • History of TIAs    • Hyperlipidemia    • Hypertension    • Obesity    • Paroxysmal atrial fibrillation (CMS/HCC)      Past Surgical History:   Procedure Laterality Date   • CATARACT EXTRACTION     • KNEE ARTHROPLASTY          PT ASSESSMENT (last 12 hours)      Physical Therapy Evaluation     Row Name 09/10/19 1334          PT Evaluation Time/Intention    Subjective Information  no complaints  -CZ (r) JE (t) CZ (c)     Document Type   evaluation  -CZ (r) JE (t) CZ (c)     Mode of Treatment  individual therapy;physical therapy  -CZ (r) JE (t) CZ (c)     Patient Effort  good  -CZ (r) JE (t) CZ (c)     Symptoms Noted During/After Treatment  none  -CZ     Row Name 09/10/19 3574          General Information    Patient Profile Reviewed?  yes  -CZ (r) JE (t) CZ (c)     Onset of Illness/Injury or Date of Surgery  09/10/19  -CZ (r) JE (t) CZ (c)     Referring Physician  Ruddy Ramirez MD  -CZ (r) JE (t) CZ (c)     Patient Observations  alert;cooperative  -CZ (r) JE (t) CZ (c)     Patient/Family Observations  No family present  -CZ (r) JE (t) CZ (c)     General Observations of Patient  Supine in bed, HOB elevated, O2, IV clamped, bed exit alarm on, in no apparent distress  -CZ (r) JE (t) CZ (c)     Prior Level of Function  independent:;all household mobility;gait;transfer;bed mobility  -CZ     Equipment Currently Used at Home  rollator;walker, rolling;hospital bed;oxygen transport chair, lift chair  -CZ (r) JE (t) CZ (c)     Pertinent History of Current Functional Problem  Patient admitted from SNF for progressive dyspnea, coughing, and wheezing for a period of a week  -CZ (r) JE (t) CZ (c)     Existing Precautions/Restrictions  fall;oxygen therapy device and L/min  -CZ (r) JE (t) CZ (c)     Equipment Issued to Patient  gait belt  -CZ (r) JE (t) CZ (c)     Benefits Reviewed  patient:;improve function;increase independence;increase strength;increase balance;decrease pain;increase knowledge  -CZ (r) JE (t) CZ (c)     Row Name 09/10/19 6694          Relationship/Environment    Primary Source of Support/Comfort  child(zenon)  -CZ (r) JE (t) CZ (c)     Lives With  child(zenon), adult  -CZ (r) JE (t) CZ (c)     Name(s) of Who Lives With Patient  Patient's daughter  -CZ (r) JE (t) CZ (c)     Concerns About Impact on Relationships  Patient lived alone in an apartment, transferred to SNF after hospitalization for a fall; now wishes to discharge to daughter's  house.   -CZ     Row Name 09/10/19 1334          Resource/Environmental Concerns    Current Living Arrangements  extended care facility  -CZ     Row Name 09/10/19 1334          Living Environment    Living Arrangements  house  -CZ (r) JE (t) CZ (c)     Home Accessibility  other (see comments) ramp to enter daughter's house, with bilateral handrails  -CZ     Row Name 09/10/19 1334          Cognitive Assessment/Intervention- PT/OT    Orientation Status (Cognition)  oriented x 4  -CZ (r) JE (t) CZ (c)     Follows Commands (Cognition)  WNL  -CZ (r) JE (t) CZ (c)     Row Name 09/10/19 1334          Bed Mobility Assessment/Treatment    Bed Mobility Assessment/Treatment  supine-sit-supine;rolling left;rolling right  -CZ     Rolling Left [place] (Bed Mobility)  maximum assist (25% patient effort)  -CZ     Rolling Right [place] (Bed Mobility)  maximum assist (25% patient effort)  -CZ     Supine-Sit-Supine [place] (Bed Mobility)  supervision  -CZ (r) JE (t) CZ (c)     Assistive Device (Bed Mobility)  bed rails;head of bed elevated  -CZ (r) JE (t) CZ (c)     Comment (Bed Mobility)  Difficulty rolling left in bed due to L shoulder stiffness  -CZ (r) JE (t) CZ (c)     Row Name 09/10/19 1338          Transfer Assessment/Treatment    Transfer Assessment/Treatment  sit-stand transfer  -CZ (r) JE (t) CZ (c)     Sit-Stand [place] (Transfers)  contact guard  -CZ (r) JE (t) CZ (c)     Row Name 09/10/19 1339          Sit-Stand Transfer    Assistive Device (Sit-Stand Transfers)  walker, front-wheeled  -CZ (r) JE (t) CZ (c)     Row Name 09/10/19 1334          Gait/Stairs Assessment/Training    [place] Level (Gait)  contact guard  -CZ (r) JE (t) CZ (c)     Assistive Device (Gait)  walker, front-wheeled  -CZ (r) JE (t) CZ (c)     Distance in Feet (Gait)  3' x 1  -CZ (r) JE (t) CZ (c)     Pattern (Gait)  other (see comments) Lateral stepping towards head of bed  -CZ (r) JE (t) CZ (c)     Comment (Gait/Stairs)   Lateral stepping towards head of bed  -CZ     Row Name 09/10/19 1334          General ROM    GENERAL ROM COMMENTS  BLE AROM WFL grossly  -CZ (r) JE (t) CZ (c)     Row Name 09/10/19 1334          MMT (Manual Muscle Testing)    General MMT Comments  BLE strength 4/5 grossly  -CZ (r) JE (t) CZ (c)     Row Name 09/10/19 1334          Sensory Assessment/Intervention    Sensory General Assessment  no sensation deficits identified  -CZ (r) JE (t) CZ (c)     Row Name 09/10/19 1334          Pain Assessment    Additional Documentation  Pain Scale: Numbers Pre/Post-Treatment (Group)  -CZ (r) JE (t) CZ (c)     Row Name 09/10/19 0394          Pain Scale: Numbers Pre/Post-Treatment    Pain Scale: Numbers, Pretreatment  0/10 - no pain  -CZ (r) JE (t) CZ (c)     Pain Scale: Numbers, Post-Treatment  0/10 - no pain  -CZ (r) JE (t) CZ (c)     Row Name             Wound 09/10/19 0630 Left anterior knee Other (comment)    Wound - Properties Group Date first assessed: 09/10/19  -KH Time first assessed: 0630  -KH Side: Left  -KH Orientation: anterior  -KH Location: knee  -KH Primary Wound Type: Other  -KH    Row Name 09/10/19 1334          Plan of Care Review    Plan of Care Reviewed With  patient  -CZ (r) JE (t) CZ (c)     [facility] Name 09/10/19 1334          Physical Therapy Clinical Impression    Date of Referral to PT  09/10/19  -CZ (r) JE (t) CZ (c)     PT Diagnosis (PT Clinical Impression)  Impaired physical mobility  -CZ (r) JE (t) CZ (c)     Prognosis (PT Clinical Impression)  Good  -CZ (r) JE (t) CZ (c)     Criteria for Skilled Interventions Met (PT Clinical Impression)  yes;treatment indicated  -CZ (r) JE (t) CZ (c)     Pathology/Pathophysiology Noted (Describe Specifically for Each System)  musculoskeletal;pulmonary  -CZ (r) JE (t) CZ (c)     Impairments Found (describe specific impairments)  aerobic capacity/endurance;ergonomics and body mechanics;gait, locomotion, and balance;muscle performance;ventilation and respiration/gas  exchange  -CZ (r) JE (t) CZ (c)     Rehab Potential (PT Clinical Summary)  good, to achieve stated therapy goals  -CZ (r) JE (t) CZ (c)     Predicted Duration of Therapy (PT)  1-3 days  -CZ (r) JE (t) CZ (c)     Row Name 09/10/19 9831          Vital Signs    Pre Systolic BP Rehab  129  -CZ (r) JE (t) CZ (c)     Pre Treatment Diastolic BP  87  -CZ (r) JE (t) CZ (c)     Post Systolic BP Rehab  113  -CZ (r) JE (t) CZ (c)     Post Treatment Diastolic BP  71  -CZ (r) JE (t) CZ (c)     Pretreatment Heart Rate (beats/min)  82  -CZ (r) JE (t) CZ (c)     Intratreatment Heart Rate (beats/min)  84  -CZ (r) JE (t) CZ (c)     Posttreatment Heart Rate (beats/min)  75  -CZ (r) JE (t) CZ (c)     Pre SpO2 (%)  96  -CZ (r) JE (t) CZ (c)     O2 Delivery Pre Treatment  supplemental O2  -CZ (r) JE (t) CZ (c)     Intra SpO2 (%)  96  -CZ (r) JE (t) CZ (c)     O2 Delivery Intra Treatment  supplemental O2  -CZ (r) JE (t) CZ (c)     Post SpO2 (%)  97  -CZ (r) JE (t) CZ (c)     O2 Delivery Post Treatment  supplemental O2  -CZ (r) JE (t) CZ (c)     Pre Patient Position  Supine  -CZ (r) JE (t) CZ (c)     Intra Patient Position  Sitting  -CZ (r) JE (t) CZ (c)     Post Patient Position  Supine  -CZ (r) JE (t) CZ (c)     Row Name 09/10/19 1763          Physical Therapy Goals    Bed Mobility Goal Selection (PT)  bed mobility, PT goal 1  -CZ (r) JE (t) CZ (c)     Transfer Goal Selection (PT)  transfer, PT goal 1  -CZ (r) JE (t) CZ (c)     Gait Training Goal Selection (PT)  gait training, PT goal 1  -CZ (r) JE (t) CZ (c)     Row Name 09/10/19 0567          Bed Mobility Goal 1 (PT)    Activity/Assistive Device (Bed Mobility Goal 1, PT)  sit to supine/supine to sit  -CZ (r) JE (t) CZ (c)     [place] Level/Cues Needed (Bed Mobility Goal 1, PT)  conditional independence  -CZ (r) JE (t) CZ (c)     Time Frame (Bed Mobility Goal 1, PT)  long term goal (LTG);by discharge  -CZ (r) JE (t) CZ (c)     Barriers (Bed Mobility Goal 1, PT)  Maintain spO2 above  90%  -CZ (r) JE (t) CZ (c)     Progress/Outcomes (Bed Mobility Goal 1, PT)  goal not met  -CZ (r) JE (t) CZ (c)     Row Name 09/10/19 1333          Transfer Goal 1 (PT)    Activity/Assistive Device (Transfer Goal 1, PT)  sit-to-stand/stand-to-sit  -CZ (r) JE (t) CZ (c)     [place] Level/Cues Needed (Transfer Goal 1, PT)  conditional independence  -CZ (r) JE (t) CZ (c)     Time Frame (Transfer Goal 1, PT)  long term goal (LTG);by discharge  -CZ (r) JE (t) CZ (c)     Barriers (Transfers Goal 1, PT)  Maintain spO2 above 90%  -CZ (r) JE (t) CZ (c)     Progress/Outcome (Transfer Goal 1, PT)  goal not met  -CZ (r) JE (t) CZ (c)     Row Name 09/10/19 1335          Gait Training Goal 1 (PT)    Activity/Assistive Device (Gait Training Goal 1, PT)  walker, rolling  -CZ (r) JE (t) CZ (c)     [place] Level (Gait Training Goal 1, PT)  conditional independence  -CZ (r) JE (t) CZ (c)     Distance (Gait Goal 1, PT)  100' x 1  -CZ (r) JE (t) CZ (c)     Time Frame (Gait Training Goal 1, PT)  long term goal (LTG);by discharge  -CZ (r) JE (t) CZ (c)     Barriers (Gait Training Goal 1, PT)  Maintain spO2 above 90%  -CZ (r) JE (t) CZ (c)     Progress/Outcome (Gait Training Goal 1, PT)  goal not met  -CZ (r) JE (t) CZ (c)     Row Name 09/10/19 1336          Positioning and Restraints    Pre-Treatment Position  in bed  -CZ (r) JE (t) CZ (c)     Post Treatment Position  bed  -CZ (r) JE (t) CZ (c)     In Bed  supine;call light within reach;exit alarm on;side rails up x2  -CZ (r) JE (t) CZ (c)       User Key  (r) = Recorded By, (t) = Taken By, (c) = Cosigned By    Initials Name Provider Type    KH Keaton, Liza R, RN Registered Nurse    Adair Weller, PT Physical Therapist    Sushil Diaz, PT Student PT Student        Physical Therapy Education     Title: PT OT SLP Therapies (In Progress)     Topic: Physical Therapy (In Progress)     Point: Mobility training (Done)     Learning Progress Summary           Patient  Acceptance, E, STAN,NR by  at 9/10/2019  3:31 PM    Comment:  Patient was educated on safety with mobility, hands placement and correct body mechanics for transfers, and pursed lip breathing.                               User Key     Initials Effective Dates Name Provider Type Discipline     08/06/19 -  Sushil Huang, PT Student PT Student PT              PT Recommendation and Plan  Anticipated Discharge Disposition (PT): home with home health  Outcome Summary/Treatment Plan (PT)  Anticipated Discharge Disposition (PT): home with home health  Outcome Measures     Row Name 09/10/19 1500             How much help from another person do you currently need...    Turning from your back to your side while in flat bed without using bedrails?  3  -CZ (r) JE (t) CZ (c)      Moving from lying on back to sitting on the side of a flat bed without bedrails?  3  -CZ (r) JE (t) CZ (c)      Moving to and from a bed to a chair (including a wheelchair)?  3  -CZ (r) JE (t) CZ (c)      Standing up from a chair using your arms (e.g., wheelchair, bedside chair)?  3  -CZ (r) JE (t) CZ (c)      Climbing 3-5 steps with a railing?  3  -CZ (r) JE (t) CZ (c)      To walk in hospital room?  3  -CZ (r) JE (t) CZ (c)      AM-PAC 6 Clicks Score (PT)  18  -CZ (r) JE (t)         Functional Assessment    Outcome Measure Options  AM-PAC 6 Clicks Basic Mobility (PT)  -CZ (r) JE (t) CZ (c)        User Key  (r) = Recorded By, (t) = Taken By, (c) = Cosigned By    Initials Name Provider Type    CZ Adair Sue, PT Physical Therapist    Sushil Diaz, PT Student PT Student         Time Calculation:   PT Charges     Row Name 09/10/19 1603 09/10/19 1000          Time Calculation    Start Time  1334  -CZ  --     Stop Time  1414  -CZ  --     Time Calculation (min)  40 min  -CZ  --     PT Received On  09/10/19  -CZ  --     PT - Next Appointment  --  09/11/19  -CZ     PT Goal Re-Cert Due Date  09/23/19  -CZ  --       User Key  (r) = Recorded By, (t)  = Taken By, (c) = Cosigned By    Initials Name Provider Type    CZ Adair Sue, PT Physical Therapist        Therapy Charges for Today     Code Description Service Date Service Provider Modifiers Qty    [id number] HC PT EVAL MOD COMPLEXITY 3 9/10/2019 Adair Sue, PT GP 1          PT G-Codes  Outcome Measure Options: AM-PAC 6 Clicks Basic Mobility (PT)  AM-PAC 6 Clicks Score (PT): 18      Adair Sue, PT  9/10/2019         room air

## 2024-07-24 ENCOUNTER — RESULT REVIEW (OUTPATIENT)
Age: 69
End: 2024-07-24

## 2024-07-24 DIAGNOSIS — R07.9 CHEST PAIN, UNSPECIFIED: ICD-10-CM

## 2024-07-24 DIAGNOSIS — I10 ESSENTIAL (PRIMARY) HYPERTENSION: ICD-10-CM

## 2024-07-24 LAB
A1C WITH ESTIMATED AVERAGE GLUCOSE RESULT: 6.6 % — HIGH (ref 4–5.6)
ALBUMIN SERPL ELPH-MCNC: 3.9 G/DL — SIGNIFICANT CHANGE UP (ref 3.3–5.2)
ALP SERPL-CCNC: 130 U/L — HIGH (ref 40–120)
ALT FLD-CCNC: 12 U/L — SIGNIFICANT CHANGE UP
AMPHET UR-MCNC: NEGATIVE — SIGNIFICANT CHANGE UP
ANION GAP SERPL CALC-SCNC: 16 MMOL/L — SIGNIFICANT CHANGE UP (ref 5–17)
APPEARANCE UR: CLEAR — SIGNIFICANT CHANGE UP
AST SERPL-CCNC: 15 U/L — SIGNIFICANT CHANGE UP
BACTERIA # UR AUTO: NEGATIVE /HPF — SIGNIFICANT CHANGE UP
BARBITURATES UR SCN-MCNC: NEGATIVE — SIGNIFICANT CHANGE UP
BASOPHILS # BLD AUTO: 0.08 K/UL — SIGNIFICANT CHANGE UP (ref 0–0.2)
BASOPHILS NFR BLD AUTO: 0.6 % — SIGNIFICANT CHANGE UP (ref 0–2)
BENZODIAZ UR-MCNC: NEGATIVE — SIGNIFICANT CHANGE UP
BILIRUB SERPL-MCNC: 0.7 MG/DL — SIGNIFICANT CHANGE UP (ref 0.4–2)
BILIRUB UR-MCNC: NEGATIVE — SIGNIFICANT CHANGE UP
BUN SERPL-MCNC: 12.4 MG/DL — SIGNIFICANT CHANGE UP (ref 8–20)
CALCIUM SERPL-MCNC: 9.9 MG/DL — SIGNIFICANT CHANGE UP (ref 8.4–10.5)
CAST: 2 /LPF — SIGNIFICANT CHANGE UP (ref 0–4)
CHLORIDE SERPL-SCNC: 102 MMOL/L — SIGNIFICANT CHANGE UP (ref 96–108)
CO2 SERPL-SCNC: 21 MMOL/L — LOW (ref 22–29)
COCAINE METAB.OTHER UR-MCNC: NEGATIVE — SIGNIFICANT CHANGE UP
COLOR SPEC: YELLOW — SIGNIFICANT CHANGE UP
CREAT SERPL-MCNC: 0.79 MG/DL — SIGNIFICANT CHANGE UP (ref 0.5–1.3)
DIFF PNL FLD: NEGATIVE — SIGNIFICANT CHANGE UP
EGFR: 82 ML/MIN/1.73M2 — SIGNIFICANT CHANGE UP
EOSINOPHIL # BLD AUTO: 0.24 K/UL — SIGNIFICANT CHANGE UP (ref 0–0.5)
EOSINOPHIL NFR BLD AUTO: 1.9 % — SIGNIFICANT CHANGE UP (ref 0–6)
ESTIMATED AVERAGE GLUCOSE: 143 MG/DL — HIGH (ref 68–114)
ETHANOL SERPL-MCNC: <10 MG/DL — SIGNIFICANT CHANGE UP (ref 0–9)
FENTANYL UR QL SCN: NEGATIVE — SIGNIFICANT CHANGE UP
GLUCOSE SERPL-MCNC: 110 MG/DL — HIGH (ref 70–99)
GLUCOSE UR QL: NEGATIVE MG/DL — SIGNIFICANT CHANGE UP
HCT VFR BLD CALC: 41.8 % — SIGNIFICANT CHANGE UP (ref 34.5–45)
HGB BLD-MCNC: 14.4 G/DL — SIGNIFICANT CHANGE UP (ref 11.5–15.5)
IMM GRANULOCYTES NFR BLD AUTO: 0.3 % — SIGNIFICANT CHANGE UP (ref 0–0.9)
KETONES UR-MCNC: ABNORMAL MG/DL
LEUKOCYTE ESTERASE UR-ACNC: ABNORMAL
LYMPHOCYTES # BLD AUTO: 33.1 % — SIGNIFICANT CHANGE UP (ref 13–44)
LYMPHOCYTES # BLD AUTO: 4.24 K/UL — HIGH (ref 1–3.3)
MCHC RBC-ENTMCNC: 31.8 PG — SIGNIFICANT CHANGE UP (ref 27–34)
MCHC RBC-ENTMCNC: 34.4 GM/DL — SIGNIFICANT CHANGE UP (ref 32–36)
MCV RBC AUTO: 92.3 FL — SIGNIFICANT CHANGE UP (ref 80–100)
METHADONE UR-MCNC: NEGATIVE — SIGNIFICANT CHANGE UP
MONOCYTES # BLD AUTO: 0.87 K/UL — SIGNIFICANT CHANGE UP (ref 0–0.9)
MONOCYTES NFR BLD AUTO: 6.8 % — SIGNIFICANT CHANGE UP (ref 2–14)
NEUTROPHILS # BLD AUTO: 7.33 K/UL — SIGNIFICANT CHANGE UP (ref 1.8–7.4)
NEUTROPHILS NFR BLD AUTO: 57.3 % — SIGNIFICANT CHANGE UP (ref 43–77)
NITRITE UR-MCNC: NEGATIVE — SIGNIFICANT CHANGE UP
NT-PROBNP SERPL-SCNC: 223 PG/ML — SIGNIFICANT CHANGE UP (ref 0–300)
OPIATES UR-MCNC: NEGATIVE — SIGNIFICANT CHANGE UP
PCP SPEC-MCNC: SIGNIFICANT CHANGE UP
PCP UR-MCNC: NEGATIVE — SIGNIFICANT CHANGE UP
PH UR: 6 — SIGNIFICANT CHANGE UP (ref 5–8)
PLATELET # BLD AUTO: 328 K/UL — SIGNIFICANT CHANGE UP (ref 150–400)
POTASSIUM SERPL-MCNC: 3.8 MMOL/L — SIGNIFICANT CHANGE UP (ref 3.5–5.3)
POTASSIUM SERPL-SCNC: 3.8 MMOL/L — SIGNIFICANT CHANGE UP (ref 3.5–5.3)
PROT SERPL-MCNC: 7 G/DL — SIGNIFICANT CHANGE UP (ref 6.6–8.7)
PROT UR-MCNC: SIGNIFICANT CHANGE UP MG/DL
RBC # BLD: 4.53 M/UL — SIGNIFICANT CHANGE UP (ref 3.8–5.2)
RBC # FLD: 11.9 % — SIGNIFICANT CHANGE UP (ref 10.3–14.5)
RBC CASTS # UR COMP ASSIST: 2 /HPF — SIGNIFICANT CHANGE UP (ref 0–4)
SODIUM SERPL-SCNC: 139 MMOL/L — SIGNIFICANT CHANGE UP (ref 135–145)
SP GR SPEC: 1.02 — SIGNIFICANT CHANGE UP (ref 1–1.03)
SQUAMOUS # UR AUTO: 1 /HPF — SIGNIFICANT CHANGE UP (ref 0–5)
THC UR QL: POSITIVE
TROPONIN T, HIGH SENSITIVITY RESULT: 11 NG/L — SIGNIFICANT CHANGE UP (ref 0–51)
TROPONIN T, HIGH SENSITIVITY RESULT: 12 NG/L — SIGNIFICANT CHANGE UP (ref 0–51)
UROBILINOGEN FLD QL: 1 MG/DL — SIGNIFICANT CHANGE UP (ref 0.2–1)
WBC # BLD: 12.8 K/UL — HIGH (ref 3.8–10.5)
WBC # FLD AUTO: 12.8 K/UL — HIGH (ref 3.8–10.5)
WBC UR QL: 42 /HPF — HIGH (ref 0–5)

## 2024-07-24 PROCEDURE — 93010 ELECTROCARDIOGRAM REPORT: CPT

## 2024-07-24 PROCEDURE — 71045 X-RAY EXAM CHEST 1 VIEW: CPT | Mod: 26

## 2024-07-24 PROCEDURE — 93306 TTE W/DOPPLER COMPLETE: CPT | Mod: 26

## 2024-07-24 PROCEDURE — 99284 EMERGENCY DEPT VISIT MOD MDM: CPT

## 2024-07-24 PROCEDURE — 99223 1ST HOSP IP/OBS HIGH 75: CPT

## 2024-07-24 RX ORDER — ATORVASTATIN CALCIUM 20 MG/1
20 TABLET, FILM COATED ORAL AT BEDTIME
Refills: 0 | Status: DISCONTINUED | OUTPATIENT
Start: 2024-07-24 | End: 2024-07-31

## 2024-07-24 RX ORDER — KETOROLAC TROMETHAMINE 30 MG/ML
15 INJECTION, SOLUTION INTRAMUSCULAR EVERY 8 HOURS
Refills: 0 | Status: DISCONTINUED | OUTPATIENT
Start: 2024-07-24 | End: 2024-07-25

## 2024-07-24 RX ORDER — ACETAMINOPHEN 325 MG
650 TABLET ORAL ONCE
Refills: 0 | Status: COMPLETED | OUTPATIENT
Start: 2024-07-24 | End: 2024-07-24

## 2024-07-24 RX ORDER — METOCLOPRAMIDE 5 MG/5ML
10 SOLUTION ORAL ONCE
Refills: 0 | Status: COMPLETED | OUTPATIENT
Start: 2024-07-24 | End: 2024-07-24

## 2024-07-24 RX ORDER — ACETAMINOPHEN 325 MG
650 TABLET ORAL EVERY 6 HOURS
Refills: 0 | Status: DISCONTINUED | OUTPATIENT
Start: 2024-07-24 | End: 2024-07-31

## 2024-07-24 RX ORDER — SENNOSIDES 8.6 MG
2 TABLET ORAL AT BEDTIME
Refills: 0 | Status: DISCONTINUED | OUTPATIENT
Start: 2024-07-24 | End: 2024-07-31

## 2024-07-24 RX ORDER — OXYCODONE HYDROCHLORIDE 100 MG/5ML
2.5 SOLUTION ORAL EVERY 6 HOURS
Refills: 0 | Status: DISCONTINUED | OUTPATIENT
Start: 2024-07-24 | End: 2024-07-25

## 2024-07-24 RX ORDER — OXYCODONE HYDROCHLORIDE 100 MG/5ML
5 SOLUTION ORAL EVERY 6 HOURS
Refills: 0 | Status: DISCONTINUED | OUTPATIENT
Start: 2024-07-24 | End: 2024-07-24

## 2024-07-24 RX ORDER — METOPROLOL TARTRATE 50 MG
25 TABLET ORAL ONCE
Refills: 0 | Status: COMPLETED | OUTPATIENT
Start: 2024-07-24 | End: 2024-07-24

## 2024-07-24 RX ORDER — CEPHALEXIN 500 MG
500 CAPSULE ORAL EVERY 12 HOURS
Refills: 0 | Status: DISCONTINUED | OUTPATIENT
Start: 2024-07-25 | End: 2024-07-31

## 2024-07-24 RX ORDER — KETOROLAC TROMETHAMINE 30 MG/ML
15 INJECTION, SOLUTION INTRAMUSCULAR ONCE
Refills: 0 | Status: DISCONTINUED | OUTPATIENT
Start: 2024-07-24 | End: 2024-07-24

## 2024-07-24 RX ORDER — CEFTRIAXONE SODIUM 500 MG
1000 VIAL (EA) INJECTION ONCE
Refills: 0 | Status: COMPLETED | OUTPATIENT
Start: 2024-07-24 | End: 2024-07-24

## 2024-07-24 RX ORDER — POLYETHYLENE GLYCOL 3350 1 G/G
17 POWDER ORAL DAILY
Refills: 0 | Status: DISCONTINUED | OUTPATIENT
Start: 2024-07-24 | End: 2024-07-31

## 2024-07-24 RX ORDER — LOSARTAN POTASSIUM 100 MG/1
50 TABLET, FILM COATED ORAL DAILY
Refills: 0 | Status: DISCONTINUED | OUTPATIENT
Start: 2024-07-24 | End: 2024-07-31

## 2024-07-24 RX ORDER — ALPRAZOLAM 2 MG/1
0.25 TABLET ORAL ONCE
Refills: 0 | Status: DISCONTINUED | OUTPATIENT
Start: 2024-07-24 | End: 2024-07-25

## 2024-07-24 RX ORDER — ASPIRIN 325 MG/1
324 TABLET, FILM COATED ORAL ONCE
Refills: 0 | Status: COMPLETED | OUTPATIENT
Start: 2024-07-24 | End: 2024-07-24

## 2024-07-24 RX ORDER — LORATADINE 10 MG/1
10 TABLET ORAL DAILY
Refills: 0 | Status: DISCONTINUED | OUTPATIENT
Start: 2024-07-24 | End: 2024-07-31

## 2024-07-24 RX ADMIN — POLYETHYLENE GLYCOL 3350 17 GRAM(S): 1 POWDER ORAL at 22:51

## 2024-07-24 RX ADMIN — KETOROLAC TROMETHAMINE 15 MILLIGRAM(S): 30 INJECTION, SOLUTION INTRAMUSCULAR at 04:33

## 2024-07-24 RX ADMIN — METOCLOPRAMIDE 10 MILLIGRAM(S): 5 SOLUTION ORAL at 02:04

## 2024-07-24 RX ADMIN — OXYCODONE HYDROCHLORIDE 2.5 MILLIGRAM(S): 100 SOLUTION ORAL at 22:50

## 2024-07-24 RX ADMIN — Medication 1000 MILLIGRAM(S): at 06:57

## 2024-07-24 RX ADMIN — Medication 25 MILLIGRAM(S): at 12:31

## 2024-07-24 RX ADMIN — ASPIRIN 324 MILLIGRAM(S): 325 TABLET, FILM COATED ORAL at 02:53

## 2024-07-24 RX ADMIN — Medication 2 TABLET(S): at 22:51

## 2024-07-24 RX ADMIN — KETOROLAC TROMETHAMINE 15 MILLIGRAM(S): 30 INJECTION, SOLUTION INTRAMUSCULAR at 16:10

## 2024-07-24 RX ADMIN — LORATADINE 10 MILLIGRAM(S): 10 TABLET ORAL at 12:31
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
## 2024-07-24 NOTE — CONSULT NOTE ADULT - PROBLEM SELECTOR RECOMMENDATION 9
Monitor on Tele overnight for acute arrhythmias, check labs including CBC, BMP, trend CE x3,   - FLP and A1C in AM, check ECG and CXR tonight  - low cholesterol diet, monitor lFts  - will schedule for TTE to assess functional/structural status  - pain management as needed (NTG/MSO4)  - SW evaluation
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
## 2024-07-24 NOTE — ED ADULT NURSE REASSESSMENT NOTE - NS ED NURSE REASSESS COMMENT FT1
Patient A&o x 4, observed sleeping in bed, arousable to voice. Reports right shoulder pain persists but "not as bad" does not want any pain meds. Currently awaiting ECHO. Bed is in lowest position and side rails up .

## 2024-07-24 NOTE — CONSULT NOTE ADULT - PROBLEM SELECTOR RECOMMENDATION 2
Assess for target organ damage (CKD, papilledema, encephalopathy) BP goal<130/80mmHg  - lifestyle modifications (DASH diet, daily exercise encouraged, weight loss, limit ETOH intake, avoid NSAIDs)   - VS as per unit protocol  - pharmacologic options: Thiazide (Chlorthalidone) with normal GFR, (Loops w/ GFR<30ml/min), ACEi/ARBs, CCBs, if still not at goal start BB    case d/w Dr. Desir

## 2024-07-24 NOTE — ED CDU PROVIDER INITIAL DAY NOTE - PHYSICAL EXAMINATION
Gen: Well appearing in NAD  Head: NC/AT  Neck: trachea midline  Resp:  No distress cta  card rr, no obvious murmur no peripheral edema  Ext: no deformities  Neuro:  A&O appears non focal  Skin:  Warm and dry as visualized  Psych:  Normal affect and mood
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
## 2024-07-24 NOTE — ED ADULT NURSE REASSESSMENT NOTE - NS ED NURSE REASSESS COMMENT FT1
assumed care of pt from DILEEP Griffith at 1915. pt ambulatory with steady gait in NAD, resps even and unlabored. pt VSS. pt given fluids and meal and tolerating PO intake well. A&Ox4. pt educated of plan of care and verbalizes understanding. pt remains on cardiac monitor and safety maintained
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
## 2024-07-24 NOTE — ED CDU PROVIDER INITIAL DAY NOTE - OBJECTIVE STATEMENT
69 yo female hx of HTN,CVA  presented to the ED for Chest Pain when she went to homeless shelter. + family hx of CAD. denies associated dizziness palpitations HA leg swelling weakness. denies recent illness.   placed in observation following cards consultation pending TTE.

## 2024-07-24 NOTE — ED CDU PROVIDER INITIAL DAY NOTE - CLINICAL SUMMARY MEDICAL DECISION MAKING FREE TEXT BOX
67 yo female hx of htn cva presented to the ed with cp following being placed at shelter, no active chest pain. seen by cardiology pending TTE. serial trops flat ekg nonischemic

## 2024-07-24 NOTE — ED PROVIDER NOTE - NS ED ROS FT
Constitutional: no fevers, chills  HEENT: no visual changes, no sore throat, no rhinorrhea  CV: + cp  Resp: + sob  GI: no abd pain, n/v, diarrhea/constipation  : no dysuria, hematuria  MSK: no joint pains, + right scapula pain  skin: no rashes  neuro: no HA, no confusion  psych: no SI/HI

## 2024-07-24 NOTE — ED PROVIDER NOTE - PHYSICAL EXAMINATION
Vitals: WNL  Gen: laying comfortably in NAD   Head: NCAT    ENT: EOMI. No exudates  CV: RRR. Audible S1 and S2. No murmurs. 2+ radial and DP pulses   Pulm: Clear to auscultation bilaterally. No wheezes, rales, or rhonchi  Abd: soft, NTND, no rebound, no guarding  Musculoskeletal:  No peripheral edema, no calf ttp, + sebaceous cyst right scapula  Neurologic: AAOx3, no focal deficits  : no CVA tenderness
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
## 2024-07-25 VITALS
SYSTOLIC BLOOD PRESSURE: 152 MMHG | DIASTOLIC BLOOD PRESSURE: 81 MMHG | RESPIRATION RATE: 18 BRPM | TEMPERATURE: 98 F | OXYGEN SATURATION: 98 % | HEART RATE: 69 BPM

## 2024-07-25 DIAGNOSIS — E78.5 HYPERLIPIDEMIA, UNSPECIFIED: ICD-10-CM

## 2024-07-25 LAB
ANION GAP SERPL CALC-SCNC: 13 MMOL/L — SIGNIFICANT CHANGE UP (ref 5–17)
BUN SERPL-MCNC: 38.5 MG/DL — HIGH (ref 8–20)
CALCIUM SERPL-MCNC: 9 MG/DL — SIGNIFICANT CHANGE UP (ref 8.4–10.5)
CHLORIDE SERPL-SCNC: 103 MMOL/L — SIGNIFICANT CHANGE UP (ref 96–108)
CO2 SERPL-SCNC: 20 MMOL/L — LOW (ref 22–29)
CREAT SERPL-MCNC: 0.9 MG/DL — SIGNIFICANT CHANGE UP (ref 0.5–1.3)
CULTURE RESULTS: SIGNIFICANT CHANGE UP
EGFR: 71 ML/MIN/1.73M2 — SIGNIFICANT CHANGE UP
GLUCOSE SERPL-MCNC: 115 MG/DL — HIGH (ref 70–99)
POTASSIUM SERPL-MCNC: 5.5 MMOL/L — HIGH (ref 3.5–5.3)
POTASSIUM SERPL-SCNC: 5.5 MMOL/L — HIGH (ref 3.5–5.3)
SODIUM SERPL-SCNC: 136 MMOL/L — SIGNIFICANT CHANGE UP (ref 135–145)
SPECIMEN SOURCE: SIGNIFICANT CHANGE UP
TROPONIN T, HIGH SENSITIVITY RESULT: 6 NG/L — SIGNIFICANT CHANGE UP (ref 0–51)

## 2024-07-25 PROCEDURE — 85025 COMPLETE CBC W/AUTO DIFF WBC: CPT

## 2024-07-25 PROCEDURE — 80061 LIPID PANEL: CPT

## 2024-07-25 PROCEDURE — 96374 THER/PROPH/DIAG INJ IV PUSH: CPT | Mod: XU

## 2024-07-25 PROCEDURE — 93005 ELECTROCARDIOGRAM TRACING: CPT

## 2024-07-25 PROCEDURE — 36415 COLL VENOUS BLD VENIPUNCTURE: CPT

## 2024-07-25 PROCEDURE — 93306 TTE W/DOPPLER COMPLETE: CPT

## 2024-07-25 PROCEDURE — 99238 HOSP IP/OBS DSCHRG MGMT 30/<: CPT

## 2024-07-25 PROCEDURE — 71045 X-RAY EXAM CHEST 1 VIEW: CPT

## 2024-07-25 PROCEDURE — 96376 TX/PRO/DX INJ SAME DRUG ADON: CPT

## 2024-07-25 PROCEDURE — 81001 URINALYSIS AUTO W/SCOPE: CPT

## 2024-07-25 PROCEDURE — 99285 EMERGENCY DEPT VISIT HI MDM: CPT | Mod: 25

## 2024-07-25 PROCEDURE — 96375 TX/PRO/DX INJ NEW DRUG ADDON: CPT | Mod: XU

## 2024-07-25 PROCEDURE — G0378: CPT

## 2024-07-25 PROCEDURE — 75574 CT ANGIO HRT W/3D IMAGE: CPT | Mod: 26,MC

## 2024-07-25 PROCEDURE — 84484 ASSAY OF TROPONIN QUANT: CPT

## 2024-07-25 PROCEDURE — 87086 URINE CULTURE/COLONY COUNT: CPT

## 2024-07-25 PROCEDURE — 80053 COMPREHEN METABOLIC PANEL: CPT

## 2024-07-25 PROCEDURE — 83880 ASSAY OF NATRIURETIC PEPTIDE: CPT

## 2024-07-25 PROCEDURE — 80307 DRUG TEST PRSMV CHEM ANLYZR: CPT

## 2024-07-25 PROCEDURE — 80048 BASIC METABOLIC PNL TOTAL CA: CPT

## 2024-07-25 PROCEDURE — 75574 CT ANGIO HRT W/3D IMAGE: CPT | Mod: MC

## 2024-07-25 PROCEDURE — 99283 EMERGENCY DEPT VISIT LOW MDM: CPT

## 2024-07-25 PROCEDURE — 83036 HEMOGLOBIN GLYCOSYLATED A1C: CPT

## 2024-07-25 RX ORDER — OXYCODONE HYDROCHLORIDE 100 MG/5ML
10 SOLUTION ORAL EVERY 6 HOURS
Refills: 0 | Status: DISCONTINUED | OUTPATIENT
Start: 2024-07-25 | End: 2024-07-25

## 2024-07-25 RX ORDER — ATORVASTATIN CALCIUM 20 MG/1
1 TABLET, FILM COATED ORAL
Qty: 30 | Refills: 0
Start: 2024-07-25 | End: 2024-08-23

## 2024-07-25 RX ORDER — OXYCODONE HYDROCHLORIDE 100 MG/5ML
5 SOLUTION ORAL EVERY 6 HOURS
Refills: 0 | Status: DISCONTINUED | OUTPATIENT
Start: 2024-07-25 | End: 2024-07-25

## 2024-07-25 RX ORDER — CEPHALEXIN 500 MG
1 CAPSULE ORAL
Qty: 10 | Refills: 0
Start: 2024-07-25 | End: 2024-07-29

## 2024-07-25 RX ORDER — METOPROLOL TARTRATE 50 MG
50 TABLET ORAL ONCE
Refills: 0 | Status: DISCONTINUED | OUTPATIENT
Start: 2024-07-25 | End: 2024-07-31

## 2024-07-25 RX ORDER — LOSARTAN POTASSIUM 100 MG/1
1 TABLET, FILM COATED ORAL
Qty: 30 | Refills: 0
Start: 2024-07-25 | End: 2024-08-23

## 2024-07-25 RX ORDER — AMLODIPINE BESYLATE 2.5 MG/1
1 TABLET ORAL
Qty: 30 | Refills: 0
Start: 2024-07-25 | End: 2024-08-23

## 2024-07-25 RX ADMIN — OXYCODONE HYDROCHLORIDE 5 MILLIGRAM(S): 100 SOLUTION ORAL at 03:23

## 2024-07-25 RX ADMIN — KETOROLAC TROMETHAMINE 15 MILLIGRAM(S): 30 INJECTION, SOLUTION INTRAMUSCULAR at 00:47

## 2024-07-25 RX ADMIN — Medication 500 MILLIGRAM(S): at 06:57

## 2024-07-25 RX ADMIN — LORATADINE 10 MILLIGRAM(S): 10 TABLET ORAL at 13:44

## 2024-07-25 RX ADMIN — LOSARTAN POTASSIUM 50 MILLIGRAM(S): 100 TABLET, FILM COATED ORAL at 06:57

## 2024-07-25 RX ADMIN — ALPRAZOLAM 0.25 MILLIGRAM(S): 2 TABLET ORAL at 08:32
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
## 2024-07-25 NOTE — ED CDU PROVIDER SUBSEQUENT DAY NOTE - ATTENDING APP SHARED VISIT CONTRIBUTION OF CARE
indep eval  OBS for CP protocol  todays plan is cardiac CTA  agree w plan and cardiology input after results available  agree w plan

## 2024-07-25 NOTE — PROGRESS NOTE ADULT - SUBJECTIVE AND OBJECTIVE BOX
[facility]                                                         CARDIOLOGY AT [facility]                                                                  [address], [place] [address]                                                         Telephone: [phone]. Fax:[phone]                                                                             PROGRESS NOTE    Reason for follow up: Chest Pain  Update: Patient is still complaining of pain all over, no specific chest pain. Patient awaiting CTA cardiac, but echocardiogram shows LVEF >75%, mild to mod LVH.      Review of symptoms:   Cardiac:  No chest pain. No dyspnea. No palpitations.  Respiratory: no cough. No dyspnea  Gastrointestinal: No diarrhea. No abdominal pain. No bleeding.   Neuro: No focal neuro complaints.    Vitals:  T(C): 36.6 (07-25-24 @ 07:28), Max: 36.7 (07-24-24 @ 17:06)  HR: 74 (07-25-24 @ 07:28) (66 - 85)  BP: 148/78 (07-25-24 @ 07:28) (110/75 - 168/93)  RR: 17 (07-25-24 @ 07:28) (17 - 18)  SpO2: 97% (07-25-24 @ 07:28) (96% - 98%)  Wt(kg): --  I&O's Summary        PHYSICAL EXAM:  Appearance: Comfortable. No acute distress  HEENT:  Atraumatic. Normocephalic.  Normal oral mucosa  Neurologic: A & O x 3, no gross focal deficits.  Cardiovascular: RRR S1 S2, No murmur, no rubs/gallops. No JVD  Respiratory: Lungs clear to auscultation, unlabored   Gastrointestinal:  Soft, Non-tender, + BS  Lower Extremities: 2+ Peripheral Pulses, No clubbing, cyanosis, or edema  Psychiatry: Patient is calm. No agitation.   Skin: warm and dry.    CURRENT CARDIAC MEDICATIONS:  losartan 50 milliGRAM(s) Oral daily  metoprolol tartrate 50 milliGRAM(s) Oral once PRN      CURRENT OTHER MEDICATIONS:  loratadine 10 milliGRAM(s) Oral daily  cephalexin 500 milliGRAM(s) Oral every 12 hours  acetaminophen     Tablet .. 650 milliGRAM(s) Oral every 6 hours PRN Mild Pain (1 - 3)  ketorolac   Injectable 15 milliGRAM(s) IV Push every 8 hours PRN Moderate Pain (4 - 6)  oxyCODONE    IR 5 milliGRAM(s) Oral every 6 hours PRN Moderate Pain (4 - 6)  oxyCODONE    IR 10 milliGRAM(s) Oral every 6 hours PRN Severe Pain (7 - 10)  polyethylene glycol 3350 17 Gram(s) Oral daily  senna 2 Tablet(s) Oral at bedtime  atorvastatin 20 milliGRAM(s) Oral at bedtime      LABS:	 	                            14.4   12.80 )-----------( 328      ( 24 Jul 2024 02:05 )             41.8     07-24    139  |  102  |  12.4  ----------------------------<  110<H>  3.8   |  21.0<L>  |  0.79    Ca    9.9      24 Jul 2024 02:05    TPro  7.0  /  Alb  3.9  /  TBili  0.7  /  DBili  x   /  AST  15  /  ALT  12  /  AlkPhos  130<H>  07-24      Lipid Profile: Date: 07-24 @ 04:45  Total cholesterol 227; Direct LDL: --; HDL: 51; Triglycerides:97    HgA1c:   TSH:     TELEMETRY: SR  ECG:    DIAGNOSTIC TESTING:  [ ] Echocardiogram:   < from: TTE W or WO Ultrasound Enhancing Agent (07.24.24 @ 12:51) >  CONCLUSIONS:      1. Left ventricular cavity is normal in size. Left ventricular systolic function is hyperdynamic.   2. There is mild (grade 1) left ventricular diastolic dysfunction, with elevated left ventricular filling pressure.   3. Mild to moderate left ventricular hypertrophy.   4. Normal right ventricular cavity size and normal right ventricular systolic function.   5. The left atrium is normal in size.   6. The right atrium is normal in size.   7. Thickened mitral valve leaflets.   8. Trace mitral regurgitation.   9. Trileafletaortic valve with normal systolic excursion. There is mild thickening of the aortic valve leaflets.  10. No pericardial effusion seen.  11. Lipomatous interatrial septal hypertrophy present.  12. Pulmonary artery systolic pressure could not be estimated.    < end of copied text >    [ ]  Catheterization:  [ ] Stress Test:    OTHER:

## 2024-07-25 NOTE — ED CDU PROVIDER SUBSEQUENT DAY NOTE - HISTORY
patient initially was complaining of the right shoulder pain and left hip pain that improved with oxycodone resting comfortably after overnight. No events overnight.

## 2024-07-25 NOTE — ED CDU PROVIDER SUBSEQUENT DAY NOTE - CLINICAL SUMMARY MEDICAL DECISION MAKING FREE TEXT BOX
67 yo female hx of htn cva presented to the ed with cp following being placed at shelter, no active chest pain. seen by cardiology pending TTE. serial trops flat ekg   abnormal    -  EKG and abnormal, TTE with some abnormality finding pending CCTA

## 2024-07-25 NOTE — ED ADULT NURSE REASSESSMENT NOTE - NS ED NURSE REASSESS COMMENT FT1
pt c/o pain to right shoulder and generalized severe pain all over body. PA Zenaida made aware and pain meds given as per PA orders. pt safety maintained

## 2024-07-25 NOTE — ED ADULT NURSE REASSESSMENT NOTE - NS ED NURSE REASSESS COMMENT FT1
Pt care assumed at 7:38am, A&O X4, received resting comfortably in bed with no signs of acute distress, respiration even and unlabored. Patient on Telebox with NSR, denies any pain or discomfort.  Awaiting CT Angio. Continuous Cardiac Monitoring in place. Safety maintained. Pt care assumed at 7:38am, A&O X4, received resting comfortably in bed with no signs of acute distress, respiration even and unlabored. Patient on Telebox with NSR. Patient on Observation. Awaiting CT Angio. Continuous Cardiac Monitoring in place. Safety maintained.

## 2024-07-25 NOTE — PROGRESS NOTE ADULT - NS ATTEND AMEND GEN_ALL_CORE FT
Patient seen and examined by me.    T(C): 36.4 (07-25-24 @ 13:48), Max: 36.7 (07-24-24 @ 20:28)  HR: 69 (07-25-24 @ 13:48) (63 - 85)  BP: 152/81 (07-25-24 @ 13:48) (110/75 - 153/80)  RR: 18 (07-25-24 @ 13:48) (17 - 18)  SpO2: 98% (07-25-24 @ 13:48) (96% - 98%)  Patient alert and awake.  Chest- Bilateral Clear BS  Cardiac- S1 and S2  Abdomen- Soft    Assessment/Plan:    Coronary CTA- Nonobstructive CAD  Patient to be treated medically.    I have discussed my recommendation with the PA which are outlined above.  Will sign off

## 2024-07-25 NOTE — ED CDU PROVIDER DISPOSITION NOTE - NSFOLLOWUPINSTRUCTIONS_ED_ALL_ED_FT
please take daily baby aspirin   please also resume statin and antihypertensives   please take oral antibiotic till completion   follow with cardiology outpt   new or worsening condition return to the ED     Chest Pain    Chest pain can be caused by many different conditions which may or may not be dangerous. Causes include heartburn, lung infections, heart attack, blood clot in lungs, skin infections, strain or damage to muscle, cartilage, or bones, etc. In addition to a history and physical examination, an electrocardiogram (ECG) or other lab tests may have been performed to determine the cause of your chest pain. Follow up with your primary care provider or with a cardiologist as instructed.     SEEK IMMEDIATE MEDICAL CARE IF YOU HAVE ANY OF THE FOLLOWING SYMPTOMS: worsening chest pain, coughing up blood, unexplained back/neck/jaw pain, severe abdominal pain, dizziness or lightheadedness, fainting, shortness of breath, sweaty or clammy skin, vomiting, or racing heart beat. These symptoms may represent a serious problem that is an emergency. Do not wait to see if the symptoms will go away. Get medical help right away. Call 911 and do not drive yourself to the hospital.
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
## 2024-07-25 NOTE — PROGRESS NOTE ADULT - PROBLEM SELECTOR PLAN 1
- Chest pain resolved.   - Echocardiogram with EF>75%, mild to mod LVH.   - NPO  - Plan for cardiac CTA today.   - Metoprolol 50mg PO PRN for HR goal of 60.  - If CTA with so significant stenosis, then D/C home with outpatient cardiology follow up.

## 2024-07-25 NOTE — ED CDU PROVIDER DISPOSITION NOTE - CLINICAL COURSE
67 yo female hx of HTN previous CVA homeless, presented to the ED with chest discomfort after presenting to [facility] housing, Twi on ekg, trops flat. pt seen by cardiology with recs for TTE and CTCA. imaging and case reviewed by cardiology, plan to dc with outpt fu as well as started on daily aspirin and statin. medications sent to [facility] for meds to bed. pt also found to have UTi started on KEFLEX

## 2024-07-25 NOTE — ED CDU PROVIDER DISPOSITION NOTE - NSFOLLOWUPCLINICS_GEN_ALL_ED_FT
[facility]  Cardiology  [address] [place] [address]  Phone: [phone]  Fax:   Follow Up Time: 7-10 Days

## 2024-10-07 PROBLEM — I10 ESSENTIAL (PRIMARY) HYPERTENSION: Chronic | Status: ACTIVE | Noted: 2024-07-24

## 2024-10-07 PROBLEM — Z86.73 PERSONAL HISTORY OF TRANSIENT ISCHEMIC ATTACK (TIA), AND CEREBRAL INFARCTION WITHOUT RESIDUAL DEFICITS: Chronic | Status: ACTIVE | Noted: 2024-07-24

## 2024-10-29 ENCOUNTER — APPOINTMENT (OUTPATIENT)
Dept: OPHTHALMOLOGY | Facility: CLINIC | Age: 69
End: 2024-10-29

## 2025-01-09 NOTE — ED ADULT NURSE NOTE - NS ED NURSE LEVEL OF CONSCIOUSNESS MENTAL STATUS
Addended by: VERONICA BARBA on: 1/9/2025 08:53 AM     Modules accepted: Orders     Awake/Alert/Cooperative

## 2025-03-13 NOTE — ED CDU PROVIDER DISPOSITION NOTE - PATIENT PORTAL LINK FT
You can access the FollowMyHealth Patient Portal offered by [facility] by registering at the following website: [email or web address]. By joining [employer]’s FollowMyHealth portal, you will also be able to view your health information using other applications (apps) compatible with our system.
2021

## 2025-03-25 ENCOUNTER — EMERGENCY (EMERGENCY)
Facility: HOSPITAL | Age: 70
LOS: 1 days | Discharge: HOME | End: 2025-03-25
Attending: EMERGENCY MEDICINE
Payer: MEDICARE

## 2025-03-25 VITALS
OXYGEN SATURATION: 97 % | RESPIRATION RATE: 16 BRPM | HEART RATE: 86 BPM | TEMPERATURE: 98 F | SYSTOLIC BLOOD PRESSURE: 161 MMHG | DIASTOLIC BLOOD PRESSURE: 84 MMHG

## 2025-03-25 VITALS
WEIGHT: 154.98 LBS | HEART RATE: 84 BPM | DIASTOLIC BLOOD PRESSURE: 78 MMHG | OXYGEN SATURATION: 99 % | RESPIRATION RATE: 16 BRPM | SYSTOLIC BLOOD PRESSURE: 158 MMHG | TEMPERATURE: 98 F

## 2025-03-25 LAB
ALBUMIN SERPL ELPH-MCNC: 4.1 G/DL — SIGNIFICANT CHANGE UP (ref 3.3–5.2)
ALT FLD-CCNC: 17 U/L — SIGNIFICANT CHANGE UP
ANION GAP SERPL CALC-SCNC: 10 MMOL/L — SIGNIFICANT CHANGE UP (ref 5–17)
APTT BLD: 30.6 SEC — SIGNIFICANT CHANGE UP (ref 24.5–35.6)
BASOPHILS # BLD AUTO: 0.06 K/UL — SIGNIFICANT CHANGE UP (ref 0–0.2)
BASOPHILS NFR BLD AUTO: 0.6 % — SIGNIFICANT CHANGE UP (ref 0–2)
BILIRUB SERPL-MCNC: 0.4 MG/DL — SIGNIFICANT CHANGE UP (ref 0.4–2)
BUN SERPL-MCNC: 17.4 MG/DL — SIGNIFICANT CHANGE UP (ref 8–20)
CALCIUM SERPL-MCNC: 9.9 MG/DL — SIGNIFICANT CHANGE UP (ref 8.4–10.5)
CHLORIDE SERPL-SCNC: 103 MMOL/L — SIGNIFICANT CHANGE UP (ref 96–108)
CO2 SERPL-SCNC: 24 MMOL/L — SIGNIFICANT CHANGE UP (ref 22–29)
CREAT SERPL-MCNC: 0.79 MG/DL — SIGNIFICANT CHANGE UP (ref 0.5–1.3)
EGFR: 82 ML/MIN/1.73M2 — SIGNIFICANT CHANGE UP
EGFR: 82 ML/MIN/1.73M2 — SIGNIFICANT CHANGE UP
EOSINOPHIL # BLD AUTO: 0.16 K/UL — SIGNIFICANT CHANGE UP (ref 0–0.5)
EOSINOPHIL NFR BLD AUTO: 1.6 % — SIGNIFICANT CHANGE UP (ref 0–6)
FLUAV AG NPH QL: SIGNIFICANT CHANGE UP
FLUBV AG NPH QL: SIGNIFICANT CHANGE UP
GLUCOSE SERPL-MCNC: 100 MG/DL — HIGH (ref 70–99)
HCT VFR BLD CALC: 41.4 % — SIGNIFICANT CHANGE UP (ref 34.5–45)
HGB BLD-MCNC: 13.9 G/DL — SIGNIFICANT CHANGE UP (ref 11.5–15.5)
IMM GRANULOCYTES # BLD AUTO: 0.03 K/UL — SIGNIFICANT CHANGE UP (ref 0–0.07)
IMM GRANULOCYTES NFR BLD AUTO: 0.3 % — SIGNIFICANT CHANGE UP (ref 0–0.9)
INR BLD: 0.98 RATIO — SIGNIFICANT CHANGE UP (ref 0.85–1.16)
LYMPHOCYTES # BLD AUTO: 2.56 K/UL — SIGNIFICANT CHANGE UP (ref 1–3.3)
LYMPHOCYTES NFR BLD AUTO: 25.8 % — SIGNIFICANT CHANGE UP (ref 13–44)
MCHC RBC-ENTMCNC: 31.4 PG — SIGNIFICANT CHANGE UP (ref 27–34)
MCHC RBC-ENTMCNC: 33.6 G/DL — SIGNIFICANT CHANGE UP (ref 32–36)
MCV RBC AUTO: 93.7 FL — SIGNIFICANT CHANGE UP (ref 80–100)
MONOCYTES # BLD AUTO: 0.54 K/UL — SIGNIFICANT CHANGE UP (ref 0–0.9)
MONOCYTES NFR BLD AUTO: 5.4 % — SIGNIFICANT CHANGE UP (ref 2–14)
NEUTROPHILS # BLD AUTO: 6.56 K/UL — SIGNIFICANT CHANGE UP (ref 1.8–7.4)
NEUTROPHILS NFR BLD AUTO: 66.3 % — SIGNIFICANT CHANGE UP (ref 43–77)
NRBC # BLD AUTO: 0 K/UL — SIGNIFICANT CHANGE UP (ref 0–0)
NRBC # FLD: 0 K/UL — SIGNIFICANT CHANGE UP (ref 0–0)
NRBC BLD AUTO-RTO: 0 /100 WBCS — SIGNIFICANT CHANGE UP (ref 0–0)
PLATELET # BLD AUTO: 350 K/UL — SIGNIFICANT CHANGE UP (ref 150–400)
PMV BLD: 11 FL — SIGNIFICANT CHANGE UP (ref 7–13)
POTASSIUM SERPL-MCNC: 5.2 MMOL/L — SIGNIFICANT CHANGE UP (ref 3.5–5.3)
POTASSIUM SERPL-SCNC: 5.2 MMOL/L — SIGNIFICANT CHANGE UP (ref 3.5–5.3)
PROT SERPL-MCNC: 7 G/DL — SIGNIFICANT CHANGE UP (ref 6.6–8.7)
PROTHROM AB SERPL-ACNC: 11.4 SEC — SIGNIFICANT CHANGE UP (ref 9.9–13.4)
RBC # FLD: 12.2 % — SIGNIFICANT CHANGE UP (ref 10.3–14.5)
RSV RNA NPH QL NAA+NON-PROBE: SIGNIFICANT CHANGE UP
SARS-COV-2 RNA SPEC QL NAA+PROBE: SIGNIFICANT CHANGE UP
SODIUM SERPL-SCNC: 137 MMOL/L — SIGNIFICANT CHANGE UP (ref 135–145)
SOURCE RESPIRATORY: SIGNIFICANT CHANGE UP
T3 SERPL-MCNC: 113 NG/DL — SIGNIFICANT CHANGE UP (ref 80–200)
T4 AB SER-ACNC: 6.9 UG/DL — SIGNIFICANT CHANGE UP (ref 4.5–12)
TROPONIN T, HIGH SENSITIVITY RESULT: 16 NG/L — SIGNIFICANT CHANGE UP (ref 0–51)
TROPONIN T, HIGH SENSITIVITY RESULT: 17 NG/L — SIGNIFICANT CHANGE UP (ref 0–51)
TSH SERPL-MCNC: 0.72 UIU/ML — SIGNIFICANT CHANGE UP (ref 0.27–4.2)
WBC # BLD: 9.91 K/UL — SIGNIFICANT CHANGE UP (ref 3.8–10.5)
WBC # FLD AUTO: 9.91 K/UL — SIGNIFICANT CHANGE UP (ref 3.8–10.5)

## 2025-03-25 PROCEDURE — 99285 EMERGENCY DEPT VISIT HI MDM: CPT | Mod: GC

## 2025-03-25 PROCEDURE — 93010 ELECTROCARDIOGRAM REPORT: CPT

## 2025-03-25 PROCEDURE — 85025 COMPLETE CBC W/AUTO DIFF WBC: CPT

## 2025-03-25 PROCEDURE — 85730 THROMBOPLASTIN TIME PARTIAL: CPT

## 2025-03-25 PROCEDURE — 96360 HYDRATION IV INFUSION INIT: CPT

## 2025-03-25 PROCEDURE — 99285 EMERGENCY DEPT VISIT HI MDM: CPT | Mod: 25

## 2025-03-25 PROCEDURE — 36415 COLL VENOUS BLD VENIPUNCTURE: CPT

## 2025-03-25 PROCEDURE — 84443 ASSAY THYROID STIM HORMONE: CPT

## 2025-03-25 PROCEDURE — 83735 ASSAY OF MAGNESIUM: CPT

## 2025-03-25 PROCEDURE — 84484 ASSAY OF TROPONIN QUANT: CPT

## 2025-03-25 PROCEDURE — 85610 PROTHROMBIN TIME: CPT

## 2025-03-25 PROCEDURE — 71045 X-RAY EXAM CHEST 1 VIEW: CPT | Mod: 26

## 2025-03-25 PROCEDURE — 93005 ELECTROCARDIOGRAM TRACING: CPT

## 2025-03-25 PROCEDURE — 87637 SARSCOV2&INF A&B&RSV AMP PRB: CPT

## 2025-03-25 PROCEDURE — 84436 ASSAY OF TOTAL THYROXINE: CPT

## 2025-03-25 PROCEDURE — 84480 ASSAY TRIIODOTHYRONINE (T3): CPT

## 2025-03-25 PROCEDURE — 80053 COMPREHEN METABOLIC PANEL: CPT

## 2025-03-25 PROCEDURE — 71045 X-RAY EXAM CHEST 1 VIEW: CPT

## 2025-03-25 RX ORDER — ACETAMINOPHEN 500 MG/5ML
1000 LIQUID (ML) ORAL ONCE
Refills: 0 | Status: COMPLETED | OUTPATIENT
Start: 2025-03-25 | End: 2025-03-25

## 2025-03-25 RX ORDER — TRAMADOL HYDROCHLORIDE 50 MG/1
25 TABLET, FILM COATED ORAL ONCE
Refills: 0 | Status: DISCONTINUED | OUTPATIENT
Start: 2025-03-25 | End: 2025-03-25

## 2025-03-25 RX ADMIN — Medication 500 MILLILITER(S): at 14:56

## 2025-03-25 RX ADMIN — Medication 500 MILLILITER(S): at 15:56

## 2025-03-25 RX ADMIN — TRAMADOL HYDROCHLORIDE 25 MILLIGRAM(S): 50 TABLET, FILM COATED ORAL at 16:14

## 2025-03-25 RX ADMIN — TRAMADOL HYDROCHLORIDE 25 MILLIGRAM(S): 50 TABLET, FILM COATED ORAL at 17:14

## 2025-03-25 NOTE — ED PROVIDER NOTE - CARE PROVIDER_API CALL
Jovi Mckenzie  Cardiovascular Disease  [address] [facility], [address] [address]  Phone: [phone]  Fax: [phone]  Follow Up Time:

## 2025-03-25 NOTE — ED ADULT TRIAGE NOTE - PRO INTERPRETER NEED 2
Asthma Action Plan: After Your Child's Visit  Your Care Instructions  An asthma action plan is based on peak flow and asthma symptoms. Sorting symptoms and peak flow into red, yellow, and green \"zones\" can help you know how bad your child's asthma is and what actions you should take. Work with the doctor to make the plan. An action plan may include:  · The peak flow readings and symptoms for each zone. · What medicines your child should take in each zone. · When to call a doctor. · A list of emergency contact numbers. · A list of your child's asthma triggers. Follow-up care is a key part of your child's treatment and safety. Be sure to make and go to all appointments, and call your doctor if your child is having problems. It's also a good idea to know your child's test results and keep a list of the medicines your child takes. How can you care for your child at home? · Make sure your child takes his or her daily medicines to help minimize long-term damage and avoid asthma attacks. · Check your child's peak flow as often as your doctor suggests. This is the best way to know how well the lungs are working. · Check the action plan to see what zone your child is in.  ¨ If your child is in the green zone, he or she should keep taking daily asthma medicines as prescribed. ¨ If your child is in the yellow zone, he or she may be having or will soon have an asthma attack. There may not be any symptoms, but your child's lungs are not working as well as they should. Make sure your child takes the medicines listed in the action plan. If your child stays in the yellow zone, your doctor may need to increase the dose or add a medicine. ¨ If your child is in the red zone, follow the action plan. If symptoms or peak flow don't improve soon, your child may need to go to the emergency room or be admitted to the hospital.  · Use an asthma diary. Write down your child's peak flow readings in the asthma diary.  If your child has an attack, write down what caused it (if you know), the symptoms, and what medicine your child took. · Make sure you know how and when to call your doctor or go to the hospital.  · Take both the asthma action plan and the asthma diary--along with the peak flow meter and medicines--when you take your child to the doctor. Tell the doctor if your child is having trouble following the action plan. When should you call for help? Call 911 anytime you think your child may need emergency care. For example, call if:  · Your child has severe trouble breathing. Signs may include the chest sinking in, using belly muscles to breathe, or nostrils flaring while your child is struggling to breathe. Call your doctor now or seek immediate medical care if:  · Your child has an asthma attack and does not get better after you use the action plan. · Your child coughs up yellow, dark brown, or bloody mucus (sputum). Watch closely for changes in your child's health, and be sure to contact your doctor if:  · Your child's wheezing and coughing get worse. · Your child needs quick-relief medicine on more than 2 days a week (unless it is just for exercise). · Your child has any new symptoms, such as a fever. Where can you learn more? Go to [employer]  Enter C138 in the search box to learn more about \"Asthma Action Plan: After Your Child's Visit. \"   © 8963-7476 [employer], [employer]orporated. Care instructions adapted under license by [facility] (which disclaims liability or warranty for this information). This care instruction is for use with your licensed healthcare professional. If you have questions about a medical condition or this instruction, always ask your healthcare professional. [place] [address] any warranty or liability for your use of this information. Content Version: 85.7.253668;  Last Revised: August 29, 2012                   Asthma Attack in Children: Care Instructions  Your Care Instructions    During an asthma attack, the airways swell and narrow. This makes it hard for your child to breathe. Severe asthma attacks can be life-threatening. But you can help prevent them by keeping your child's asthma under control and treating symptoms before they get bad. Symptoms include being short of breath, having chest tightness, coughing, and wheezing. Noting and treating these symptoms can also help you avoid future trips to the emergency room. The doctor has checked your child carefully, but problems can develop later. If you notice any problems or new symptoms, get medical treatment right away. Follow-up care is a key part of your child's treatment and safety. Be sure to make and go to all appointments, and call your doctor if your child is having problems. It's also a good idea to know your child's test results and keep a list of the medicines your child takes. How can you care for your child at home? Follow an action plan  · Make and follow an asthma action plan. It lists the medicines your child takes every day and will show you what to do if your child has an attack. · Work with a doctor to make a plan if your child doesn't have one. Make treatment part of daily life. · Tell teachers and coaches that your child has asthma. Give them a copy of your child's asthma action plan. Take medications correctly  · Your child should take asthma medicines as directed. Talk to your child's doctor right away if you have any questions about how your child should take them. Most children with asthma need two types of medicine. ¨ Your child may take daily controller medicine to control asthma. This is usually an inhaled steroid. Don't use the daily medicine to treat an attack that has already started. It doesn't work fast enough. ¨ Your child will use a quick-relief medicine when he or she has symptoms of an attack. This is usually an albuterol inhaler.   ¨ Make sure that your child has quick-relief medicine with him or her at all times. ¨ If your doctor prescribed steroid pills for your child to use during an attack, give them exactly as prescribed. It may take hours for the pills to work. But they may make the episode shorter and help your child breathe better. Check your child's breathing  · If your child has a peak flow meter, use it to check how well your child is breathing. This can help you predict when an asthma attack is going to occur. Then your child can take medicine to prevent the asthma attack or make it less severe. Most children age 11 and older can learn how to use this meter. Avoid asthma triggers  · Keep your child away from smoke. Do not smoke or let anyone else smoke around your child or in your house. · Try to learn what triggers your child's asthma attacks. Then avoid the triggers when you can. Common triggers include colds, smoke, air pollution, pollen, mold, pets, cockroaches, stress, and cold air. · Make sure your child is up to date on immunizations and gets a yearly flu vaccine. When should you call for help? Call 911 anytime you think your child may need emergency care.  For example, call if:    · Your child has severe trouble breathing.    Call your doctor now or seek immediate medical care if:    · Your child's symptoms do not get better after you've followed his or her asthma action plan.     · Your child has new or worse trouble breathing.     · Your child's coughing or wheezing gets worse.     · Your child coughs up dark brown or bloody mucus (sputum).     · Your child has a new or higher fever.    Watch closely for changes in your child's health, and be sure to contact your doctor if:    · Your child needs quick-relief medicine on more than 2 days a week (unless it is just for exercise).     · Your child coughs more deeply or more often, especially if you notice more mucus or a change in the color of the mucus.     · Your child is not getting better as expected. Where can you learn more? Go to http://mamadou-zohreh.info/. Enter U727 in the search box to learn more about \"Asthma Attack in Children: Care Instructions. \"  Current as of: December 6, 2017  Content Version: 11.7  © 3854-6127 [employer]. Care instructions adapted under license by [employer] (which disclaims liability or warranty for this information). If you have questions about a medical condition or this instruction, always ask your healthcare professional. [address] any warranty or liability for your use of this information. English

## 2025-03-25 NOTE — ED ADULT NURSE NOTE - OBJECTIVE STATEMENT
A&O x 4 c/o palpitations after argument at shelter. Airway patent. Respirations even and unlabored. No JVD or diaphoresis. Intermittent chest pain. Pt on telemonitor with . Bed locked and in lowest position. Call bell within reach. Able to make needs known. Detail Level: Simple Additional Notes: Patient consent was obtained to proceed with the visit and recommended plan of care after discussion of all risks and benefits, including the risks of COVID-19 exposure. Detail Level: Zone Additional Notes: Follow up in 2 weeks Ak on middle right hand finger patient to treat area with fluorouracil 5% cream bid 2 weeks. Render Risk Assessment In Note?: no

## 2025-03-25 NOTE — ED PROVIDER NOTE - ATTENDING CONTRIBUTION TO CARE
I personally saw the patient with the resident, and completed the key components of the history and physical exam. I then discussed the management plan with the resident.    70 y/o F with PMH HTN, CVA presents for palpitations which corresponds with her anxiety. She had a stent placed in [place] 1 month ago, symptoms resolved at this time.    I agree with exam as documented.    EKG unchanged from 1 year ago with TWI laterally, trop unchanged, patient seen by social work, will go back to her shelter.

## 2025-03-25 NOTE — ED PROVIDER NOTE - NSICDXPASTMEDICALHX_GEN_ALL_CORE_FT
PAST MEDICAL HISTORY:  Back pain     COPD without exacerbation     CVA (cerebrovascular accident)     Diabetes     H/O: stroke     History of angina     HTN (hypertension)     HTN (hypertension)

## 2025-03-25 NOTE — ED PROVIDER NOTE - CLINICAL SUMMARY MEDICAL DECISION MAKING FREE TEXT BOX
H&P as stated. H&P as stated. patient well appearing, vitals stable, nontoxic, normal respiratory effort. labs and imaging reviewed. blood work unremarkable including troponin x2. ekg unchanged. cxr clear. patient remained asymptomatic during ed course. social work consulted, will get her a ride back to her shelter. cardiology follow up provided. return precautions given. stable for discharge.

## 2025-03-25 NOTE — ED PROVIDER NOTE - WR INTERPRETATION 1
CXR negative - No CHF, No cardiomegaly, No pleural effusionsCXR negative - No infiltrates, No consolidation, No atelectasis seenABD XR negative - non-specific, No free air, No air-fluid levels seenCXR negative - No pneumothorax, No opacities, No free air

## 2025-03-25 NOTE — ED PROVIDER NOTE - PATIENT PORTAL LINK FT
You can access the FollowMyHealth Patient Portal offered by [facility] by registering at the following website: [email or web address]. By joining [facility]’s FollowMyHealth portal, you will also be able to view your health information using other applications (apps) compatible with our system.

## 2025-03-25 NOTE — CHART NOTE - NSCHARTNOTEFT_GEN_A_CORE
KIZZY Note: KIZZY made aware by ED Provider that pt is requesting to speak with SW in regards to her housing. SW met with pt at bedside. As per pt, she is currently residing since 3/18 at [facility] in [place] at [address] [place] [address]. Pt reports she is unhappy with her housing and she does not feel safe due to the people there. KIZZY called [facility] and spoke to Nica ([phone]). As per Nica, pt has placement at current shelter until beginning of April. Nica reports that pt has restrictions of no stairs and no top bunk and her current shelter meets those accommodations. As per Nica, DSS cannot switch her housing at this time. Nica reports pt has to request for a transfer from current shelter with case management staff at the shelter. KIZZY provided pt with education on information [facility] worker Nica gave. Pt at first was hesitant to return to shelter. Pt, however, notes that she has some belongings at the shelter that she needs to retrieve including her cell phone. Pt in agreement to return to current shelter placement to be with all of her belongings. KIZZY informed pt to request a transfer from her current shelter placement. SW will assist pt with transportation back to shelter once medically stable. SW to follow. KIZZY Note: KIZZY made aware by ED Provider that pt is requesting to speak with SW in regards to her housing. KIZZY met with pt at bedside. As per pt, she is currently residing since 3/18 at [facility] in [place] at [address] [place] [address]. Pt reports she is unhappy with her housing and she does not feel safe due to the people there. KIZZY called [facility] and spoke to Nica ([phone]). As per Nica, pt has placement at current shelter until beginning of April. Nica reports that pt has restrictions of no stairs and no top bunk and her current shelter meets those accommodations. As per Nica, DSS cannot switch her housing at this time. Nica reports pt has to request for a transfer from current shelter with case management staff at the shelter. KIZZY provided pt with education on information [facility] worker Nica gave. Pt at first was hesitant to return to shelter. Pt, however, notes that she has some belongings at the shelter that she needs to retrieve including her cell phone. KIZZY called [facility] ([phone]) and spoke to Jess. As per Jess, pt can return with d/c papers. Pt in agreement to return to current shelter placement to be with all of her belongings. KIZZY informed pt to request a transfer from her current shelter placement. KIZZY will assist pt with transportation back to shelter once medically stable. SW to follow.

## 2025-03-25 NOTE — ED ADULT NURSE REASSESSMENT NOTE - NS ED NURSE REASSESS COMMENT FT1
Pt A&O x 4 comfortable, denies complaints at this time. Reports relief in pain. [place] footwear. Bed locked and in lowest position. Call bell within reach. Able to make needs known. DC.

## 2025-03-25 NOTE — ED PROVIDER NOTE - OBJECTIVE STATEMENT
69y female w/ pmh of HTN and stroke presenting after an episode of palpitations. states she had a coronary stent placed in [place] about a month ago. she has subsequently moved here and is living in a shelter. states whenever she gets agitated she develops palpitations. PTA the patient was upset at the shelter and developed palpitations for 20 minutes with associated chest tightness. symptoms resolved on their own. denies any fever, cough, congestion, abd pain, N/V/D, urinary symptoms.

## 2025-03-25 NOTE — CHART NOTE - NSCHARTNOTEFT_GEN_A_CORE
SWNote: pt ready for d/c to return to her shelter . No transportation available ,per coworker (Juju)voucher approved by SW supervisor . No other SW needs reported at this moment.

## 2025-03-27 DIAGNOSIS — R07.89 OTHER CHEST PAIN: ICD-10-CM

## 2025-03-27 DIAGNOSIS — R00.2 PALPITATIONS: ICD-10-CM

## 2025-03-27 DIAGNOSIS — Z95.1 PRESENCE OF AORTOCORONARY BYPASS GRAFT: ICD-10-CM

## 2025-03-27 DIAGNOSIS — Z86.73 PERSONAL HISTORY OF TRANSIENT ISCHEMIC ATTACK (TIA), AND CEREBRAL INFARCTION WITHOUT RESIDUAL DEFICITS: ICD-10-CM

## 2025-08-10 ENCOUNTER — INPATIENT (INPATIENT)
Facility: HOSPITAL | Age: 70
LOS: 2 days | Discharge: HOME | DRG: 311 | End: 2025-08-13
Attending: INTERNAL MEDICINE | Admitting: STUDENT IN AN ORGANIZED HEALTH CARE EDUCATION/TRAINING PROGRAM
Payer: MEDICARE

## 2025-08-10 VITALS
TEMPERATURE: 98 F | OXYGEN SATURATION: 94 % | SYSTOLIC BLOOD PRESSURE: 137 MMHG | HEART RATE: 78 BPM | WEIGHT: 134.92 LBS | RESPIRATION RATE: 18 BRPM | DIASTOLIC BLOOD PRESSURE: 91 MMHG

## 2025-08-10 DIAGNOSIS — I20.9 ANGINA PECTORIS, UNSPECIFIED: ICD-10-CM

## 2025-08-10 LAB
ALBUMIN SERPL ELPH-MCNC: 3.6 G/DL — SIGNIFICANT CHANGE UP (ref 3.3–5)
ALP SERPL-CCNC: 119 U/L — SIGNIFICANT CHANGE UP (ref 40–120)
ALT FLD-CCNC: 16 U/L — SIGNIFICANT CHANGE UP (ref 12–78)
ANION GAP SERPL CALC-SCNC: 8 MMOL/L — SIGNIFICANT CHANGE UP (ref 5–17)
AST SERPL-CCNC: 15 U/L — SIGNIFICANT CHANGE UP (ref 15–37)
BASOPHILS # BLD AUTO: 0.07 K/UL — SIGNIFICANT CHANGE UP (ref 0–0.2)
BASOPHILS NFR BLD AUTO: 0.6 % — SIGNIFICANT CHANGE UP (ref 0–2)
BILIRUB SERPL-MCNC: 0.8 MG/DL — SIGNIFICANT CHANGE UP (ref 0.2–1.2)
BUN SERPL-MCNC: 13 MG/DL — SIGNIFICANT CHANGE UP (ref 7–23)
CALCIUM SERPL-MCNC: 10.1 MG/DL — SIGNIFICANT CHANGE UP (ref 8.5–10.1)
CHLORIDE SERPL-SCNC: 112 MMOL/L — HIGH (ref 96–108)
CO2 SERPL-SCNC: 19 MMOL/L — LOW (ref 22–31)
CREAT SERPL-MCNC: 0.96 MG/DL — SIGNIFICANT CHANGE UP (ref 0.5–1.3)
EGFR: 65 ML/MIN/1.73M2 — SIGNIFICANT CHANGE UP
EGFR: 65 ML/MIN/1.73M2 — SIGNIFICANT CHANGE UP
EOSINOPHIL # BLD AUTO: 0.18 K/UL — SIGNIFICANT CHANGE UP (ref 0–0.5)
EOSINOPHIL NFR BLD AUTO: 1.6 % — SIGNIFICANT CHANGE UP (ref 0–6)
GLUCOSE SERPL-MCNC: 109 MG/DL — HIGH (ref 70–99)
HCT VFR BLD CALC: 43.4 % — SIGNIFICANT CHANGE UP (ref 34.5–45)
HGB BLD-MCNC: 14.7 G/DL — SIGNIFICANT CHANGE UP (ref 11.5–15.5)
IMM GRANULOCYTES # BLD AUTO: 0.02 K/UL — SIGNIFICANT CHANGE UP (ref 0–0.07)
IMM GRANULOCYTES NFR BLD AUTO: 0.2 % — SIGNIFICANT CHANGE UP (ref 0–0.9)
LYMPHOCYTES # BLD AUTO: 3.31 K/UL — HIGH (ref 1–3.3)
LYMPHOCYTES NFR BLD AUTO: 29.8 % — SIGNIFICANT CHANGE UP (ref 13–44)
MCHC RBC-ENTMCNC: 31.5 PG — SIGNIFICANT CHANGE UP (ref 27–34)
MCHC RBC-ENTMCNC: 33.9 G/DL — SIGNIFICANT CHANGE UP (ref 32–36)
MCV RBC AUTO: 92.9 FL — SIGNIFICANT CHANGE UP (ref 80–100)
MONOCYTES # BLD AUTO: 0.73 K/UL — SIGNIFICANT CHANGE UP (ref 0–0.9)
MONOCYTES NFR BLD AUTO: 6.6 % — SIGNIFICANT CHANGE UP (ref 2–14)
NEUTROPHILS # BLD AUTO: 6.79 K/UL — SIGNIFICANT CHANGE UP (ref 1.8–7.4)
NEUTROPHILS NFR BLD AUTO: 61.2 % — SIGNIFICANT CHANGE UP (ref 43–77)
NRBC # BLD AUTO: 0 K/UL — SIGNIFICANT CHANGE UP (ref 0–0)
NRBC # FLD: 0 K/UL — SIGNIFICANT CHANGE UP (ref 0–0)
NRBC BLD AUTO-RTO: 0 /100 WBCS — SIGNIFICANT CHANGE UP (ref 0–0)
NT-PROBNP SERPL-SCNC: 324 PG/ML — HIGH (ref 0–125)
PLATELET # BLD AUTO: 299 K/UL — SIGNIFICANT CHANGE UP (ref 150–400)
PMV BLD: 10.9 FL — SIGNIFICANT CHANGE UP (ref 7–13)
POTASSIUM SERPL-MCNC: 4 MMOL/L — SIGNIFICANT CHANGE UP (ref 3.5–5.3)
POTASSIUM SERPL-SCNC: 4 MMOL/L — SIGNIFICANT CHANGE UP (ref 3.5–5.3)
PROT SERPL-MCNC: 7.2 GM/DL — SIGNIFICANT CHANGE UP (ref 6–8.3)
RBC # BLD: 4.67 M/UL — SIGNIFICANT CHANGE UP (ref 3.8–5.2)
RBC # FLD: 12.2 % — SIGNIFICANT CHANGE UP (ref 10.3–14.5)
SODIUM SERPL-SCNC: 139 MMOL/L — SIGNIFICANT CHANGE UP (ref 135–145)
TROPONIN I, HIGH SENSITIVITY RESULT: 17.54 NG/L — SIGNIFICANT CHANGE UP
WBC # BLD: 11.1 K/UL — HIGH (ref 3.8–10.5)
WBC # FLD AUTO: 11.1 K/UL — HIGH (ref 3.8–10.5)

## 2025-08-10 PROCEDURE — 93306 TTE W/DOPPLER COMPLETE: CPT

## 2025-08-10 PROCEDURE — 82550 ASSAY OF CK (CPK): CPT

## 2025-08-10 PROCEDURE — 80048 BASIC METABOLIC PNL TOTAL CA: CPT

## 2025-08-10 PROCEDURE — 93005 ELECTROCARDIOGRAM TRACING: CPT

## 2025-08-10 PROCEDURE — 36415 COLL VENOUS BLD VENIPUNCTURE: CPT

## 2025-08-10 PROCEDURE — 85379 FIBRIN DEGRADATION QUANT: CPT

## 2025-08-10 PROCEDURE — 85652 RBC SED RATE AUTOMATED: CPT

## 2025-08-10 PROCEDURE — 84484 ASSAY OF TROPONIN QUANT: CPT

## 2025-08-10 PROCEDURE — 85027 COMPLETE CBC AUTOMATED: CPT

## 2025-08-10 PROCEDURE — 97116 GAIT TRAINING THERAPY: CPT | Mod: GP

## 2025-08-10 PROCEDURE — 97162 PT EVAL MOD COMPLEX 30 MIN: CPT | Mod: GP

## 2025-08-10 PROCEDURE — 93010 ELECTROCARDIOGRAM REPORT: CPT

## 2025-08-10 PROCEDURE — 82150 ASSAY OF AMYLASE: CPT

## 2025-08-10 PROCEDURE — 83690 ASSAY OF LIPASE: CPT

## 2025-08-10 PROCEDURE — 87637 SARSCOV2&INF A&B&RSV AMP PRB: CPT

## 2025-08-10 PROCEDURE — 71045 X-RAY EXAM CHEST 1 VIEW: CPT | Mod: 26

## 2025-08-10 PROCEDURE — 99285 EMERGENCY DEPT VISIT HI MDM: CPT

## 2025-08-10 RX ORDER — ALBUTEROL SULFATE 2.5 MG/3ML
2 VIAL, NEBULIZER (ML) INHALATION
Refills: 0 | DISCHARGE

## 2025-08-10 RX ORDER — METOPROLOL SUCCINATE 50 MG/1
0.5 TABLET, EXTENDED RELEASE ORAL
Refills: 0 | DISCHARGE

## 2025-08-10 RX ORDER — LORATADINE 5 MG/5ML
1 SOLUTION ORAL
Refills: 0 | DISCHARGE

## 2025-08-10 RX ORDER — ACETAMINOPHEN 500 MG/5ML
650 LIQUID (ML) ORAL ONCE
Refills: 0 | Status: COMPLETED | OUTPATIENT
Start: 2025-08-10 | End: 2025-08-10

## 2025-08-10 RX ORDER — ONDANSETRON HCL/PF 4 MG/2 ML
1 VIAL (ML) INJECTION
Refills: 0 | DISCHARGE
Start: 2025-08-10

## 2025-08-10 RX ORDER — LATANOPROST PF 0.05 MG/ML
1 SOLUTION/ DROPS OPHTHALMIC
Refills: 0 | DISCHARGE

## 2025-08-10 RX ORDER — CLOPIDOGREL BISULFATE 75 MG/1
75 TABLET, FILM COATED ORAL ONCE
Refills: 0 | Status: COMPLETED | OUTPATIENT
Start: 2025-08-10 | End: 2025-08-10

## 2025-08-10 RX ORDER — KETOROLAC TROMETHAMINE 30 MG/ML
15 INJECTION, SOLUTION INTRAMUSCULAR; INTRAVENOUS ONCE
Refills: 0 | Status: DISCONTINUED | OUTPATIENT
Start: 2025-08-10 | End: 2025-08-10

## 2025-08-10 RX ADMIN — KETOROLAC TROMETHAMINE 15 MILLIGRAM(S): 30 INJECTION, SOLUTION INTRAMUSCULAR; INTRAVENOUS at 22:42

## 2025-08-11 ENCOUNTER — TRANSCRIPTION ENCOUNTER (OUTPATIENT)
Age: 70
End: 2025-08-11

## 2025-08-11 ENCOUNTER — RESULT REVIEW (OUTPATIENT)
Age: 70
End: 2025-08-11

## 2025-08-11 DIAGNOSIS — E78.5 HYPERLIPIDEMIA, UNSPECIFIED: ICD-10-CM

## 2025-08-11 DIAGNOSIS — R07.9 CHEST PAIN, UNSPECIFIED: ICD-10-CM

## 2025-08-11 DIAGNOSIS — I25.10 ATHEROSCLEROTIC HEART DISEASE OF NATIVE CORONARY ARTERY WITHOUT ANGINA PECTORIS: ICD-10-CM

## 2025-08-11 DIAGNOSIS — I10 ESSENTIAL (PRIMARY) HYPERTENSION: ICD-10-CM

## 2025-08-11 LAB
ADD ON TEST-SPECIMEN IN LAB: SIGNIFICANT CHANGE UP
ADD ON TEST-SPECIMEN IN LAB: SIGNIFICANT CHANGE UP
AMYLASE P1 CFR SERPL: 34 U/L — SIGNIFICANT CHANGE UP (ref 25–115)
ANION GAP SERPL CALC-SCNC: 6 MMOL/L — SIGNIFICANT CHANGE UP (ref 5–17)
BUN SERPL-MCNC: 20 MG/DL — SIGNIFICANT CHANGE UP (ref 7–23)
CALCIUM SERPL-MCNC: 9.7 MG/DL — SIGNIFICANT CHANGE UP (ref 8.5–10.1)
CHLORIDE SERPL-SCNC: 110 MMOL/L — HIGH (ref 96–108)
CK SERPL-CCNC: 51 U/L — SIGNIFICANT CHANGE UP (ref 26–192)
CO2 SERPL-SCNC: 22 MMOL/L — SIGNIFICANT CHANGE UP (ref 22–31)
CREAT SERPL-MCNC: 0.86 MG/DL — SIGNIFICANT CHANGE UP (ref 0.5–1.3)
D DIMER BLD IA.RAPID-MCNC: 190 NG/ML DDU — SIGNIFICANT CHANGE UP
EGFR: 74 ML/MIN/1.73M2 — SIGNIFICANT CHANGE UP
EGFR: 74 ML/MIN/1.73M2 — SIGNIFICANT CHANGE UP
ERYTHROCYTE [SEDIMENTATION RATE] IN BLOOD: 17 MM/HR — SIGNIFICANT CHANGE UP (ref 0–20)
FLUAV AG NPH QL: SIGNIFICANT CHANGE UP
FLUBV AG NPH QL: SIGNIFICANT CHANGE UP
GLUCOSE SERPL-MCNC: 118 MG/DL — HIGH (ref 70–99)
HCT VFR BLD CALC: 41.6 % — SIGNIFICANT CHANGE UP (ref 34.5–45)
HGB BLD-MCNC: 14.1 G/DL — SIGNIFICANT CHANGE UP (ref 11.5–15.5)
LIDOCAIN IGE QN: 20 U/L — SIGNIFICANT CHANGE UP (ref 13–75)
MCHC RBC-ENTMCNC: 31.9 PG — SIGNIFICANT CHANGE UP (ref 27–34)
MCHC RBC-ENTMCNC: 33.9 G/DL — SIGNIFICANT CHANGE UP (ref 32–36)
MCV RBC AUTO: 94.1 FL — SIGNIFICANT CHANGE UP (ref 80–100)
NRBC # BLD AUTO: 0 K/UL — SIGNIFICANT CHANGE UP (ref 0–0)
NRBC # FLD: 0 K/UL — SIGNIFICANT CHANGE UP (ref 0–0)
NRBC BLD AUTO-RTO: 0 /100 WBCS — SIGNIFICANT CHANGE UP (ref 0–0)
PLATELET # BLD AUTO: 253 K/UL — SIGNIFICANT CHANGE UP (ref 150–400)
PMV BLD: 11.4 FL — SIGNIFICANT CHANGE UP (ref 7–13)
POTASSIUM SERPL-MCNC: 3.6 MMOL/L — SIGNIFICANT CHANGE UP (ref 3.5–5.3)
POTASSIUM SERPL-SCNC: 3.6 MMOL/L — SIGNIFICANT CHANGE UP (ref 3.5–5.3)
RBC # BLD: 4.42 M/UL — SIGNIFICANT CHANGE UP (ref 3.8–5.2)
RBC # FLD: 12.3 % — SIGNIFICANT CHANGE UP (ref 10.3–14.5)
RSV RNA NPH QL NAA+NON-PROBE: SIGNIFICANT CHANGE UP
SARS-COV-2 RNA SPEC QL NAA+PROBE: SIGNIFICANT CHANGE UP
SODIUM SERPL-SCNC: 138 MMOL/L — SIGNIFICANT CHANGE UP (ref 135–145)
SOURCE RESPIRATORY: SIGNIFICANT CHANGE UP
TROPONIN I, HIGH SENSITIVITY RESULT: 18.86 NG/L — SIGNIFICANT CHANGE UP
TROPONIN I, HIGH SENSITIVITY RESULT: 20.17 NG/L — SIGNIFICANT CHANGE UP
WBC # BLD: 9.31 K/UL — SIGNIFICANT CHANGE UP (ref 3.8–10.5)
WBC # FLD AUTO: 9.31 K/UL — SIGNIFICANT CHANGE UP (ref 3.8–10.5)

## 2025-08-11 PROCEDURE — 93010 ELECTROCARDIOGRAM REPORT: CPT

## 2025-08-11 PROCEDURE — 99223 1ST HOSP IP/OBS HIGH 75: CPT

## 2025-08-11 PROCEDURE — 93306 TTE W/DOPPLER COMPLETE: CPT | Mod: 26

## 2025-08-11 PROCEDURE — 99223 1ST HOSP IP/OBS HIGH 75: CPT | Mod: FS

## 2025-08-11 RX ORDER — TICAGRELOR 90 MG/1
60 TABLET ORAL EVERY 12 HOURS
Refills: 0 | Status: DISCONTINUED | OUTPATIENT
Start: 2025-08-11 | End: 2025-08-13

## 2025-08-11 RX ORDER — CLOPIDOGREL BISULFATE 75 MG/1
75 TABLET, FILM COATED ORAL DAILY
Refills: 0 | Status: DISCONTINUED | OUTPATIENT
Start: 2025-08-11 | End: 2025-08-11

## 2025-08-11 RX ORDER — ASPIRIN 325 MG
81 TABLET ORAL DAILY
Refills: 0 | Status: DISCONTINUED | OUTPATIENT
Start: 2025-08-11 | End: 2025-08-13

## 2025-08-11 RX ORDER — NITROGLYCERIN 20 MG/G
0.4 OINTMENT TOPICAL
Refills: 0 | Status: DISCONTINUED | OUTPATIENT
Start: 2025-08-11 | End: 2025-08-13

## 2025-08-11 RX ORDER — BENZONATATE 100 MG
100 CAPSULE ORAL EVERY 8 HOURS
Refills: 0 | Status: DISCONTINUED | OUTPATIENT
Start: 2025-08-11 | End: 2025-08-13

## 2025-08-11 RX ORDER — ALBUTEROL SULFATE 2.5 MG/3ML
2 VIAL, NEBULIZER (ML) INHALATION EVERY 6 HOURS
Refills: 0 | Status: DISCONTINUED | OUTPATIENT
Start: 2025-08-11 | End: 2025-08-13

## 2025-08-11 RX ORDER — KETOROLAC TROMETHAMINE 30 MG/ML
30 INJECTION, SOLUTION INTRAMUSCULAR; INTRAVENOUS EVERY 8 HOURS
Refills: 0 | Status: DISCONTINUED | OUTPATIENT
Start: 2025-08-11 | End: 2025-08-13

## 2025-08-11 RX ORDER — NICOTINE POLACRILEX 4 MG/1
1 GUM, CHEWING ORAL DAILY
Refills: 0 | Status: DISCONTINUED | OUTPATIENT
Start: 2025-08-11 | End: 2025-08-13

## 2025-08-11 RX ORDER — LOSARTAN POTASSIUM 100 MG/1
25 TABLET, FILM COATED ORAL DAILY
Refills: 0 | Status: DISCONTINUED | OUTPATIENT
Start: 2025-08-11 | End: 2025-08-12

## 2025-08-11 RX ORDER — KETOROLAC TROMETHAMINE 30 MG/ML
15 INJECTION, SOLUTION INTRAMUSCULAR; INTRAVENOUS EVERY 8 HOURS
Refills: 0 | Status: DISCONTINUED | OUTPATIENT
Start: 2025-08-11 | End: 2025-08-13

## 2025-08-11 RX ORDER — LATANOPROST PF 0.05 MG/ML
1 SOLUTION/ DROPS OPHTHALMIC AT BEDTIME
Refills: 0 | Status: DISCONTINUED | OUTPATIENT
Start: 2025-08-11 | End: 2025-08-13

## 2025-08-11 RX ORDER — METOPROLOL SUCCINATE 50 MG/1
12.5 TABLET, EXTENDED RELEASE ORAL
Refills: 0 | Status: DISCONTINUED | OUTPATIENT
Start: 2025-08-11 | End: 2025-08-13

## 2025-08-11 RX ORDER — EZETIMIBE 10 MG/1
10 TABLET ORAL DAILY
Refills: 0 | Status: DISCONTINUED | OUTPATIENT
Start: 2025-08-11 | End: 2025-08-13

## 2025-08-11 RX ADMIN — METOPROLOL SUCCINATE 12.5 MILLIGRAM(S): 50 TABLET, EXTENDED RELEASE ORAL at 10:04

## 2025-08-11 RX ADMIN — LOSARTAN POTASSIUM 25 MILLIGRAM(S): 100 TABLET, FILM COATED ORAL at 20:36

## 2025-08-11 RX ADMIN — Medication 81 MILLIGRAM(S): at 10:04

## 2025-08-11 RX ADMIN — METOPROLOL SUCCINATE 12.5 MILLIGRAM(S): 50 TABLET, EXTENDED RELEASE ORAL at 22:32

## 2025-08-11 RX ADMIN — Medication 10 MILLIGRAM(S): at 16:17

## 2025-08-11 RX ADMIN — KETOROLAC TROMETHAMINE 30 MILLIGRAM(S): 30 INJECTION, SOLUTION INTRAMUSCULAR; INTRAVENOUS at 20:25

## 2025-08-11 RX ADMIN — Medication 40 MILLIGRAM(S): at 06:03

## 2025-08-11 RX ADMIN — NICOTINE POLACRILEX 1 PATCH: 4 GUM, CHEWING ORAL at 10:04

## 2025-08-11 RX ADMIN — NICOTINE POLACRILEX 1 PATCH: 4 GUM, CHEWING ORAL at 20:02

## 2025-08-12 ENCOUNTER — TRANSCRIPTION ENCOUNTER (OUTPATIENT)
Age: 70
End: 2025-08-12

## 2025-08-12 PROCEDURE — 99232 SBSQ HOSP IP/OBS MODERATE 35: CPT | Mod: FS

## 2025-08-12 PROCEDURE — 99232 SBSQ HOSP IP/OBS MODERATE 35: CPT

## 2025-08-12 RX ORDER — LOSARTAN POTASSIUM 100 MG/1
1 TABLET, FILM COATED ORAL
Qty: 30 | Refills: 3
Start: 2025-08-12 | End: 2025-12-09

## 2025-08-12 RX ORDER — TICAGRELOR 90 MG/1
60 TABLET ORAL
Qty: 60 | Refills: 3
Start: 2025-08-12 | End: 2025-12-09

## 2025-08-12 RX ORDER — EZETIMIBE 10 MG/1
1 TABLET ORAL
Qty: 30 | Refills: 3
Start: 2025-08-12 | End: 2025-12-09

## 2025-08-12 RX ORDER — LOSARTAN POTASSIUM 100 MG/1
50 TABLET, FILM COATED ORAL ONCE
Refills: 0 | Status: COMPLETED | OUTPATIENT
Start: 2025-08-12 | End: 2025-08-12

## 2025-08-12 RX ORDER — CLOPIDOGREL BISULFATE 75 MG/1
1 TABLET, FILM COATED ORAL
Refills: 0 | DISCHARGE

## 2025-08-12 RX ORDER — GLYCERIN
1 LIQUID (ML) MISCELLANEOUS ONCE
Refills: 0 | Status: COMPLETED | OUTPATIENT
Start: 2025-08-12 | End: 2025-08-13

## 2025-08-12 RX ORDER — LOSARTAN POTASSIUM 100 MG/1
50 TABLET, FILM COATED ORAL DAILY
Refills: 0 | Status: DISCONTINUED | OUTPATIENT
Start: 2025-08-12 | End: 2025-08-13

## 2025-08-12 RX ADMIN — METOPROLOL SUCCINATE 12.5 MILLIGRAM(S): 50 TABLET, EXTENDED RELEASE ORAL at 10:18

## 2025-08-12 RX ADMIN — NICOTINE POLACRILEX 1 PATCH: 4 GUM, CHEWING ORAL at 10:10

## 2025-08-12 RX ADMIN — Medication 0.3 MILLIGRAM(S): at 18:34

## 2025-08-12 RX ADMIN — Medication 81 MILLIGRAM(S): at 10:18

## 2025-08-12 RX ADMIN — LOSARTAN POTASSIUM 50 MILLIGRAM(S): 100 TABLET, FILM COATED ORAL at 10:17

## 2025-08-12 RX ADMIN — LOSARTAN POTASSIUM 50 MILLIGRAM(S): 100 TABLET, FILM COATED ORAL at 17:16

## 2025-08-12 RX ADMIN — NICOTINE POLACRILEX 1 PATCH: 4 GUM, CHEWING ORAL at 04:19

## 2025-08-12 RX ADMIN — NICOTINE POLACRILEX 1 PATCH: 4 GUM, CHEWING ORAL at 20:39

## 2025-08-13 VITALS
HEART RATE: 55 BPM | OXYGEN SATURATION: 98 % | TEMPERATURE: 98 F | SYSTOLIC BLOOD PRESSURE: 150 MMHG | RESPIRATION RATE: 17 BRPM | DIASTOLIC BLOOD PRESSURE: 79 MMHG

## 2025-08-13 PROCEDURE — 99239 HOSP IP/OBS DSCHRG MGMT >30: CPT

## 2025-08-13 RX ADMIN — Medication 81 MILLIGRAM(S): at 10:32

## 2025-08-13 RX ADMIN — NICOTINE POLACRILEX 1 PATCH: 4 GUM, CHEWING ORAL at 00:07

## 2025-08-13 RX ADMIN — Medication 40 MILLIGRAM(S): at 06:11

## 2025-08-13 RX ADMIN — LOSARTAN POTASSIUM 50 MILLIGRAM(S): 100 TABLET, FILM COATED ORAL at 11:06

## 2025-08-13 RX ADMIN — NICOTINE POLACRILEX 1 PATCH: 4 GUM, CHEWING ORAL at 00:11

## 2025-08-13 RX ADMIN — Medication 1 SUPPOSITORY(S): at 06:43

## 2025-08-13 RX ADMIN — KETOROLAC TROMETHAMINE 30 MILLIGRAM(S): 30 INJECTION, SOLUTION INTRAMUSCULAR; INTRAVENOUS at 03:45

## 2025-08-13 RX ADMIN — NICOTINE POLACRILEX 1 PATCH: 4 GUM, CHEWING ORAL at 10:22

## 2025-08-13 RX ADMIN — KETOROLAC TROMETHAMINE 30 MILLIGRAM(S): 30 INJECTION, SOLUTION INTRAMUSCULAR; INTRAVENOUS at 03:01

## 2025-08-20 DIAGNOSIS — J44.9 CHRONIC OBSTRUCTIVE PULMONARY DISEASE, UNSPECIFIED: ICD-10-CM

## 2025-08-20 DIAGNOSIS — F12.99 CANNABIS USE, UNSPECIFIED WITH UNSPECIFIED CANNABIS-INDUCED DISORDER: ICD-10-CM

## 2025-08-20 DIAGNOSIS — R07.9 CHEST PAIN, UNSPECIFIED: ICD-10-CM

## 2025-08-20 DIAGNOSIS — Z95.5 PRESENCE OF CORONARY ANGIOPLASTY IMPLANT AND GRAFT: ICD-10-CM

## 2025-08-20 DIAGNOSIS — Z79.82 LONG TERM (CURRENT) USE OF ASPIRIN: ICD-10-CM

## 2025-08-20 DIAGNOSIS — I25.2 OLD MYOCARDIAL INFARCTION: ICD-10-CM

## 2025-08-20 DIAGNOSIS — Z91.199 PATIENT'S NONCOMPLIANCE WITH OTHER MEDICAL TREATMENT AND REGIMEN DUE TO UNSPECIFIED REASON: ICD-10-CM

## 2025-08-20 DIAGNOSIS — E11.9 TYPE 2 DIABETES MELLITUS WITHOUT COMPLICATIONS: ICD-10-CM

## 2025-08-20 DIAGNOSIS — R07.1 CHEST PAIN ON BREATHING: ICD-10-CM

## 2025-08-20 DIAGNOSIS — I10 ESSENTIAL (PRIMARY) HYPERTENSION: ICD-10-CM

## 2025-08-20 DIAGNOSIS — I69.954 HEMIPLEGIA AND HEMIPARESIS FOLLOWING UNSPECIFIED CEREBROVASCULAR DISEASE AFFECTING LEFT NON-DOMINANT SIDE: ICD-10-CM

## 2025-08-20 DIAGNOSIS — R45.1 RESTLESSNESS AND AGITATION: ICD-10-CM

## 2025-08-20 DIAGNOSIS — R26.81 UNSTEADINESS ON FEET: ICD-10-CM

## 2025-08-20 DIAGNOSIS — T45.526A UNDERDOSING OF ANTITHROMBOTIC DRUGS, INITIAL ENCOUNTER: ICD-10-CM

## 2025-08-20 DIAGNOSIS — Z79.899 OTHER LONG TERM (CURRENT) DRUG THERAPY: ICD-10-CM

## 2025-08-20 DIAGNOSIS — R07.82 INTERCOSTAL PAIN: ICD-10-CM

## 2025-08-20 DIAGNOSIS — E78.5 HYPERLIPIDEMIA, UNSPECIFIED: ICD-10-CM

## 2025-08-20 DIAGNOSIS — F17.210 NICOTINE DEPENDENCE, CIGARETTES, UNCOMPLICATED: ICD-10-CM

## 2025-08-20 DIAGNOSIS — H40.9 UNSPECIFIED GLAUCOMA: ICD-10-CM

## 2025-08-20 DIAGNOSIS — M94.0 CHONDROCOSTAL JUNCTION SYNDROME [TIETZE]: ICD-10-CM

## 2025-08-20 DIAGNOSIS — I25.119 ATHEROSCLEROTIC HEART DISEASE OF NATIVE CORONARY ARTERY WITH UNSPECIFIED ANGINA PECTORIS: ICD-10-CM

## 2025-08-25 ENCOUNTER — APPOINTMENT (OUTPATIENT)
Dept: FAMILY MEDICINE | Facility: CLINIC | Age: 70
End: 2025-08-25